# Patient Record
Sex: FEMALE | Race: BLACK OR AFRICAN AMERICAN | Employment: FULL TIME | URBAN - METROPOLITAN AREA
[De-identification: names, ages, dates, MRNs, and addresses within clinical notes are randomized per-mention and may not be internally consistent; named-entity substitution may affect disease eponyms.]

---

## 2017-01-07 ENCOUNTER — OFFICE VISIT (OUTPATIENT)
Dept: FAMILY MEDICINE CLINIC | Facility: CLINIC | Age: 42
End: 2017-01-07

## 2017-01-07 VITALS
TEMPERATURE: 99 F | OXYGEN SATURATION: 98 % | SYSTOLIC BLOOD PRESSURE: 132 MMHG | RESPIRATION RATE: 18 BRPM | DIASTOLIC BLOOD PRESSURE: 82 MMHG | HEART RATE: 96 BPM

## 2017-01-07 DIAGNOSIS — J45.21 MILD INTERMITTENT ASTHMA WITH ACUTE EXACERBATION: Primary | ICD-10-CM

## 2017-01-07 DIAGNOSIS — J01.00 ACUTE NON-RECURRENT MAXILLARY SINUSITIS: ICD-10-CM

## 2017-01-07 PROCEDURE — 99214 OFFICE O/P EST MOD 30 MIN: CPT | Performed by: FAMILY MEDICINE

## 2017-01-07 RX ORDER — PREDNISONE 20 MG/1
60 TABLET ORAL DAILY
Qty: 15 TABLET | Refills: 0 | Status: SHIPPED | OUTPATIENT
Start: 2017-01-07 | End: 2017-05-20 | Stop reason: ALTCHOICE

## 2017-01-07 RX ORDER — DEXTROMETHORPHAN HYDROBROMIDE AND PROMETHAZINE HYDROCHLORIDE 15; 6.25 MG/5ML; MG/5ML
5 SYRUP ORAL 4 TIMES DAILY PRN
Qty: 200 ML | Refills: 0 | Status: SHIPPED | OUTPATIENT
Start: 2017-01-07 | End: 2017-01-14

## 2017-01-07 RX ORDER — IPRATROPIUM BROMIDE AND ALBUTEROL SULFATE 2.5; .5 MG/3ML; MG/3ML
3 SOLUTION RESPIRATORY (INHALATION) 4 TIMES DAILY PRN
Qty: 1 CONTAINER | Refills: 0 | Status: SHIPPED | OUTPATIENT
Start: 2017-01-07 | End: 2017-02-06

## 2017-01-07 RX ORDER — AZITHROMYCIN 250 MG/1
TABLET, FILM COATED ORAL
Qty: 6 TABLET | Refills: 0 | Status: SHIPPED | OUTPATIENT
Start: 2017-01-07 | End: 2017-05-20 | Stop reason: ALTCHOICE

## 2017-01-07 NOTE — PATIENT INSTRUCTIONS
Asthma Medicine     Get used to using your inhaled corticosteroid medicine before you brush your teeth. That way you will always rinse your mouth afterward. Medicines play a key role in controlling asthma.  Some help control asthma symptoms and prevent Inhaled corticosteroids are safe for long-term use. They are not the “steroids” that you hear about athletes abusing. They usually don't cause serious side effects. That’s because they’re inhaled directly into the lungs.  The chance of minor side effects ca · Getting the right dose. Over time, your healthcare provider may raise or lower the dose of your controller medicine. The goal is to find the amount of medicine to keep asthma in control, without taking more than is needed.   · Finding the right medicines When bronchitis develops, the airways become swollen. The airways may also become infected with bacteria. This is known as a secondary infection.   Diagnosing acute bronchitis  Your healthcare provider will examine you and ask about your symptoms and health · Symptoms that don’t start to improve within a week, or within 3 days of taking antibiotics   Date Last Reviewed: 8/4/2014  © 3428-7045 [facility] [address] [place] [address], [address] [place] [address]. All rights reserved.  This information is not inte · Your appetite may be poor, so a light diet is fine. Avoid dehydration by drinking 6 to 8 glasses of fluids per day (such as water, soft drinks, sports drinks, juices, tea, or soup). Extra fluids will help loosen secretions in the nose and lungs.   · Over-

## 2017-01-07 NOTE — PROGRESS NOTES
Patient presents with:  Cough      HPI:   Becky Ramirez is a 35year old female who presents for cough  for  7  days. Patient reports congestion, dry cough, cough is keeping pt up at night. Cough started gradually, tight, wheezy,deep, dry, worse at nigh also had HEP C   • hyperlipidemia [Other] [OTHER] Mother    • Hypertension Mother    • CVA [Other] [OTHER] Maternal Grandmother    • CVA [Other] [OTHER] Paternal Grandfather    • CVA [Other] [OTHER] Paternal Grandmother         Smoking Status: Never Smoke Sig: Take 3 tablets (60 mg total) by mouth daily. azithromycin (ZITHROMAX Z-BERNARD) 250 MG Oral Tab 6 tablet 0      Sig: Take two tablets by mouth today, then one tablet daily.       Promethazine-DM 6.25-15 MG/5ML Oral Syrup 200 mL 0      Sig: Take 5 mL b

## 2017-05-20 ENCOUNTER — TELEPHONE (OUTPATIENT)
Dept: FAMILY MEDICINE CLINIC | Facility: CLINIC | Age: 42
End: 2017-05-20

## 2017-05-20 ENCOUNTER — HOSPITAL ENCOUNTER (OUTPATIENT)
Dept: GENERAL RADIOLOGY | Age: 42
Discharge: HOME | End: 2017-05-20
Attending: FAMILY MEDICINE
Payer: COMMERCIAL

## 2017-05-20 ENCOUNTER — OFFICE VISIT (OUTPATIENT)
Dept: FAMILY MEDICINE CLINIC | Facility: CLINIC | Age: 42
End: 2017-05-20

## 2017-05-20 ENCOUNTER — HOSPITAL ENCOUNTER (OUTPATIENT)
Dept: ULTRASOUND IMAGING | Age: 42
Discharge: HOME | End: 2017-05-20
Attending: FAMILY MEDICINE
Payer: COMMERCIAL

## 2017-05-20 VITALS
OXYGEN SATURATION: 99 % | SYSTOLIC BLOOD PRESSURE: 120 MMHG | DIASTOLIC BLOOD PRESSURE: 80 MMHG | WEIGHT: 293 LBS | TEMPERATURE: 99 F | BODY MASS INDEX: 45.45 KG/M2 | RESPIRATION RATE: 22 BRPM | HEART RATE: 72 BPM | HEIGHT: 67.5 IN

## 2017-05-20 DIAGNOSIS — M79.89 ARM SWELLING: ICD-10-CM

## 2017-05-20 DIAGNOSIS — M77.11 LATERAL EPICONDYLITIS OF RIGHT ELBOW: ICD-10-CM

## 2017-05-20 DIAGNOSIS — M77.11 LATERAL EPICONDYLITIS OF RIGHT ELBOW: Primary | ICD-10-CM

## 2017-05-20 PROCEDURE — 73080 X-RAY EXAM OF ELBOW: CPT | Performed by: FAMILY MEDICINE

## 2017-05-20 PROCEDURE — 93971 EXTREMITY STUDY: CPT | Performed by: FAMILY MEDICINE

## 2017-05-20 PROCEDURE — 99214 OFFICE O/P EST MOD 30 MIN: CPT | Performed by: FAMILY MEDICINE

## 2017-05-20 RX ORDER — METHYLPREDNISOLONE 4 MG/1
TABLET ORAL
Qty: 1 KIT | Refills: 0 | Status: SHIPPED | OUTPATIENT
Start: 2017-05-20 | End: 2017-07-27 | Stop reason: ALTCHOICE

## 2017-05-20 NOTE — PATIENT INSTRUCTIONS
Dr. Omari Tilley DO for nerve study    Neurology  [facility]  [address] [facility]    Phone: [phone] Dr Shae Grimm, [address] [place] [address]    Phone: [phone]

## 2017-05-21 NOTE — PROGRESS NOTES
Patient presents with:  Elbow Pain: Rt. Elbow pain x 5 days       HPI: R elbow  has been hurting for 5 days . Dull, pulling  in character. Radiation to there upper R forearm and R lower forearm, also swelling of the entire arm .  9/10. No weakness.   No num reg., no murmurs, clicks, gallops  SKIN:no rashes on the chest or back. Neuro:  Reflexes radial 2+ bilaterally. R ELBOW: no pain over medial epicondyl,  pain over lateral epicondyl, no diff with supination or pronation. Full extension and flexion.  No eff

## 2017-06-12 ENCOUNTER — TELEPHONE (OUTPATIENT)
Dept: FAMILY MEDICINE CLINIC | Facility: CLINIC | Age: 42
End: 2017-06-12

## 2017-06-12 DIAGNOSIS — R20.2 NUMBNESS AND TINGLING OF RIGHT ARM: Primary | ICD-10-CM

## 2017-06-12 DIAGNOSIS — M79.601 PAIN OF RIGHT UPPER EXTREMITY: ICD-10-CM

## 2017-06-12 DIAGNOSIS — R20.0 NUMBNESS AND TINGLING OF RIGHT ARM: Primary | ICD-10-CM

## 2017-06-12 DIAGNOSIS — M79.89 SWELLING OF ARM: ICD-10-CM

## 2017-06-12 NOTE — TELEPHONE ENCOUNTER
EMG ordered per Dr Mackenzie Andujar notes in [address] copied & pasted below. I called pts cell and left a detailed message there.

## 2017-07-26 ENCOUNTER — OFFICE VISIT (OUTPATIENT)
Dept: NEUROLOGY | Facility: CLINIC | Age: 42
End: 2017-07-26

## 2017-07-26 DIAGNOSIS — R20.2 RIGHT HAND PARESTHESIA: Primary | ICD-10-CM

## 2017-07-26 PROCEDURE — 95909 NRV CNDJ TST 5-6 STUDIES: CPT | Performed by: OTHER

## 2017-07-26 PROCEDURE — 95886 MUSC TEST DONE W/N TEST COMP: CPT | Performed by: OTHER

## 2017-07-26 NOTE — PROCEDURES
Hilaria, [address] [place] [address], [address]  Phone: [phone]  Nerve Conduction & EMG Report      Patient:         Angelica Bang  Patient ID:      [id number]  Sex:             Female  Date of Birth:   [birth year]/ mildly abnormal study. 2. There is evidence of a mild right median compressive mononeuropathy at the wrist, or carpal tunnel syndrome. Clinical correlation is needed, but it is likely not contributing to the majority of her right arm symptoms.   3. There i

## 2017-07-27 ENCOUNTER — OFFICE VISIT (OUTPATIENT)
Dept: FAMILY MEDICINE CLINIC | Facility: CLINIC | Age: 42
End: 2017-07-27

## 2017-07-27 VITALS
BODY MASS INDEX: 45.45 KG/M2 | HEART RATE: 86 BPM | TEMPERATURE: 98 F | WEIGHT: 293 LBS | DIASTOLIC BLOOD PRESSURE: 80 MMHG | SYSTOLIC BLOOD PRESSURE: 124 MMHG | OXYGEN SATURATION: 100 % | RESPIRATION RATE: 22 BRPM | HEIGHT: 67.5 IN

## 2017-07-27 DIAGNOSIS — M77.11 LATERAL EPICONDYLITIS OF RIGHT ELBOW: ICD-10-CM

## 2017-07-27 DIAGNOSIS — G56.01 CARPAL TUNNEL SYNDROME OF RIGHT WRIST: Primary | ICD-10-CM

## 2017-07-27 DIAGNOSIS — Z12.31 VISIT FOR SCREENING MAMMOGRAM: ICD-10-CM

## 2017-07-27 PROCEDURE — 99214 OFFICE O/P EST MOD 30 MIN: CPT | Performed by: FAMILY MEDICINE

## 2017-07-27 RX ORDER — CLINDAMYCIN AND BENZOYL PEROXIDE 10; 50 MG/G; MG/G
1 GEL TOPICAL NIGHTLY
Qty: 50 G | Refills: 2 | Status: SHIPPED | OUTPATIENT
Start: 2017-07-27 | End: 2018-07-22

## 2017-07-27 NOTE — PATIENT INSTRUCTIONS
[address] [facility] Corrie Schuster M.D.     Hand and Upper Extremity Surgery  Physician       [facility], [facility], [address] [place] [address] 222, [address] [address]     [phone]

## 2017-07-27 NOTE — PROGRESS NOTES
R arm pain. HPI: R elbow  has been hurting for 3 months . Dull, pulling  in character. Radiation to there upper R forearm and R lower forearm, also swelling of the entire arm .  5-9/10. No weakness. No numbness but lots of  tingling. Worsening.  Moveme gallops  SKIN:no rashes on the chest or back. Neuro:  Reflexes radial 2+ bilaterally. R WRIST: no snuff box tenderness, no pain over capitate, lunate, or hook of hamate. No pain with ulnar or radial deviation, flexion or extension. FROM. Pain on the maxim

## 2017-07-31 PROBLEM — M77.01 MEDIAL EPICONDYLITIS, RIGHT: Status: ACTIVE | Noted: 2017-07-31

## 2017-08-06 PROBLEM — M25.521 CHRONIC ELBOW PAIN, RIGHT: Status: ACTIVE | Noted: 2017-08-06

## 2017-08-06 PROBLEM — G89.29 CHRONIC ELBOW PAIN, RIGHT: Status: ACTIVE | Noted: 2017-08-06

## 2017-12-04 ENCOUNTER — OFFICE VISIT (OUTPATIENT)
Dept: FAMILY MEDICINE CLINIC | Facility: CLINIC | Age: 42
End: 2017-12-04

## 2017-12-04 VITALS
BODY MASS INDEX: 45.99 KG/M2 | OXYGEN SATURATION: 99 % | TEMPERATURE: 98 F | HEIGHT: 67 IN | HEART RATE: 72 BPM | SYSTOLIC BLOOD PRESSURE: 126 MMHG | RESPIRATION RATE: 20 BRPM | WEIGHT: 293 LBS | DIASTOLIC BLOOD PRESSURE: 84 MMHG

## 2017-12-04 DIAGNOSIS — Z13.228 SCREENING FOR ENDOCRINE, NUTRITIONAL, METABOLIC AND IMMUNITY DISORDER: ICD-10-CM

## 2017-12-04 DIAGNOSIS — Z01.419 WELL WOMAN EXAM WITH ROUTINE GYNECOLOGICAL EXAM: Primary | ICD-10-CM

## 2017-12-04 DIAGNOSIS — Z23 NEED FOR INFLUENZA VACCINATION: ICD-10-CM

## 2017-12-04 DIAGNOSIS — Z12.31 VISIT FOR SCREENING MAMMOGRAM: ICD-10-CM

## 2017-12-04 DIAGNOSIS — Z12.4 SCREENING FOR MALIGNANT NEOPLASM OF CERVIX: ICD-10-CM

## 2017-12-04 DIAGNOSIS — Z13.0 SCREENING FOR ENDOCRINE, NUTRITIONAL, METABOLIC AND IMMUNITY DISORDER: ICD-10-CM

## 2017-12-04 DIAGNOSIS — Z13.29 SCREENING FOR ENDOCRINE, NUTRITIONAL, METABOLIC AND IMMUNITY DISORDER: ICD-10-CM

## 2017-12-04 DIAGNOSIS — Z13.21 SCREENING FOR ENDOCRINE, NUTRITIONAL, METABOLIC AND IMMUNITY DISORDER: ICD-10-CM

## 2017-12-04 PROCEDURE — 87624 HPV HI-RISK TYP POOLED RSLT: CPT | Performed by: FAMILY MEDICINE

## 2017-12-04 PROCEDURE — 88175 CYTOPATH C/V AUTO FLUID REDO: CPT | Performed by: FAMILY MEDICINE

## 2017-12-04 PROCEDURE — 90686 IIV4 VACC NO PRSV 0.5 ML IM: CPT | Performed by: FAMILY MEDICINE

## 2017-12-04 PROCEDURE — 99396 PREV VISIT EST AGE 40-64: CPT | Performed by: FAMILY MEDICINE

## 2017-12-04 PROCEDURE — 90471 IMMUNIZATION ADMIN: CPT | Performed by: FAMILY MEDICINE

## 2017-12-04 RX ORDER — CLINDAMYCIN PHOSPHATE 20 MG/G
1 CREAM VAGINAL NIGHTLY
Qty: 40 G | Refills: 0 | Status: SHIPPED | OUTPATIENT
Start: 2017-12-04 | End: 2019-03-01 | Stop reason: ALTCHOICE

## 2017-12-04 NOTE — PROGRESS NOTES
Kush Yeh is a 38year old female who presents for a complete physical exam.   HPI:     Patient presents with:  Physical      Patient feels well, dental visit up to date, no hearing problem. Vaccinations up to date. [id number]  Period:scant.   Sexu (90 BASE) MCG/ACT Inhalation Aero Soln Inhale 2 puffs into the lungs every 6 (six) hours as needed.  Disp:  Rfl:       Past Medical History:   Diagnosis Date   • Atrial flutter (HCC)    • Extrinsic asthma, unspecified    • Migraine, unspecified, without men or seasonal allergies  PSYCH: no symptoms of depression or anxiety      EXAM:   /84 (BP Location: Left arm, Patient Position: Sitting, Cuff Size: large)   Pulse 72   Temp 98 °F (36.7 °C) (Oral)   Resp 20   Ht 67\"   Wt (!) 327 lb   LMP 11/27/2017   S and ACT score done, reviewed with the pt. Normal scores. Mammogram screening.     Orders Placed This Encounter      CBC W/DIFF      LIPID PANEL      TSH      Vitamin D, 25-Hydroxy [E]      COMP METABOLIC PANEL      Hpv Dna  High Risk , Thin Prep Collect

## 2017-12-09 ENCOUNTER — HOSPITAL ENCOUNTER (OUTPATIENT)
Dept: MAMMOGRAPHY | Age: 42
Discharge: HOME | End: 2017-12-09
Attending: FAMILY MEDICINE
Payer: COMMERCIAL

## 2017-12-09 DIAGNOSIS — Z12.31 VISIT FOR SCREENING MAMMOGRAM: ICD-10-CM

## 2017-12-09 PROCEDURE — 77067 SCR MAMMO BI INCL CAD: CPT | Performed by: FAMILY MEDICINE

## 2018-01-02 ENCOUNTER — TELEPHONE (OUTPATIENT)
Dept: FAMILY MEDICINE CLINIC | Facility: CLINIC | Age: 43
End: 2018-01-02

## 2018-01-02 NOTE — TELEPHONE ENCOUNTER
Patient came into the office wondering if her health & wellness form was complete at this time and she was going to pick it up. I told her I did not see it and I was going to talk to hospitals when she got back into the office on 1/3/18.  Upon further review t

## 2018-01-03 ENCOUNTER — LAB ENCOUNTER (OUTPATIENT)
Dept: LAB | Age: 43
End: 2018-01-03
Attending: FAMILY MEDICINE
Payer: COMMERCIAL

## 2018-01-03 ENCOUNTER — TELEPHONE (OUTPATIENT)
Dept: FAMILY MEDICINE CLINIC | Facility: CLINIC | Age: 43
End: 2018-01-03

## 2018-01-03 DIAGNOSIS — Z13.0 SCREENING FOR ENDOCRINE, NUTRITIONAL, METABOLIC AND IMMUNITY DISORDER: ICD-10-CM

## 2018-01-03 DIAGNOSIS — Z13.228 SCREENING FOR ENDOCRINE, NUTRITIONAL, METABOLIC AND IMMUNITY DISORDER: ICD-10-CM

## 2018-01-03 DIAGNOSIS — Z13.21 SCREENING FOR ENDOCRINE, NUTRITIONAL, METABOLIC AND IMMUNITY DISORDER: ICD-10-CM

## 2018-01-03 DIAGNOSIS — Z13.29 SCREENING FOR ENDOCRINE, NUTRITIONAL, METABOLIC AND IMMUNITY DISORDER: ICD-10-CM

## 2018-01-03 LAB
25-HYDROXYVITAMIN D (TOTAL): 11.6 NG/ML (ref 30–100)
ALBUMIN SERPL-MCNC: 3.6 G/DL (ref 3.5–4.8)
ALP LIVER SERPL-CCNC: 70 U/L (ref 37–98)
ALT SERPL-CCNC: 46 U/L (ref 14–54)
AST SERPL-CCNC: 31 U/L (ref 15–41)
BASOPHILS # BLD AUTO: 0.05 X10(3) UL (ref 0–0.1)
BASOPHILS NFR BLD AUTO: 0.9 %
BILIRUB SERPL-MCNC: 0.9 MG/DL (ref 0.1–2)
BUN BLD-MCNC: 15 MG/DL (ref 8–20)
CALCIUM BLD-MCNC: 9.8 MG/DL (ref 8.3–10.3)
CHLORIDE: 103 MMOL/L (ref 101–111)
CHOLEST SMN-MCNC: 228 MG/DL (ref ?–200)
CO2: 27 MMOL/L (ref 22–32)
CREAT BLD-MCNC: 1.01 MG/DL (ref 0.55–1.02)
EOSINOPHIL # BLD AUTO: 0.13 X10(3) UL (ref 0–0.3)
EOSINOPHIL NFR BLD AUTO: 2.4 %
ERYTHROCYTE [DISTWIDTH] IN BLOOD BY AUTOMATED COUNT: 12.7 % (ref 11.5–16)
GLUCOSE BLD-MCNC: 87 MG/DL (ref 70–99)
HCT VFR BLD AUTO: 43.3 % (ref 34–50)
HDLC SERPL-MCNC: 41 MG/DL (ref 45–?)
HDLC SERPL: 5.56 {RATIO} (ref ?–4.44)
HGB BLD-MCNC: 14 G/DL (ref 12–16)
IMMATURE GRANULOCYTE COUNT: 0.04 X10(3) UL (ref 0–1)
IMMATURE GRANULOCYTE RATIO %: 0.7 %
LDLC SERPL CALC-MCNC: 119 MG/DL (ref ?–130)
LYMPHOCYTES # BLD AUTO: 2.09 X10(3) UL (ref 0.9–4)
LYMPHOCYTES NFR BLD AUTO: 38.3 %
M PROTEIN MFR SERPL ELPH: 7.6 G/DL (ref 6.1–8.3)
MCH RBC QN AUTO: 27.3 PG (ref 27–33.2)
MCHC RBC AUTO-ENTMCNC: 32.3 G/DL (ref 31–37)
MCV RBC AUTO: 84.6 FL (ref 81–100)
MONOCYTES # BLD AUTO: 0.35 X10(3) UL (ref 0.1–0.6)
MONOCYTES NFR BLD AUTO: 6.4 %
NEUTROPHIL ABS PRELIM: 2.79 X10 (3) UL (ref 1.3–6.7)
NEUTROPHILS # BLD AUTO: 2.79 X10(3) UL (ref 1.3–6.7)
NEUTROPHILS NFR BLD AUTO: 51.3 %
NONHDLC SERPL-MCNC: 187 MG/DL (ref ?–130)
PLATELET # BLD AUTO: 176 10(3)UL (ref 150–450)
POTASSIUM SERPL-SCNC: 4.1 MMOL/L (ref 3.6–5.1)
RBC # BLD AUTO: 5.12 X10(6)UL (ref 3.8–5.1)
RED CELL DISTRIBUTION WIDTH-SD: 39 FL (ref 35.1–46.3)
SODIUM SERPL-SCNC: 137 MMOL/L (ref 136–144)
TRIGL SERPL-MCNC: 339 MG/DL (ref ?–150)
TSI SER-ACNC: 4.31 MIU/ML (ref 0.35–5.5)
VLDLC SERPL CALC-MCNC: 68 MG/DL (ref 5–40)
WBC # BLD AUTO: 5.5 X10(3) UL (ref 4–13)

## 2018-01-03 PROCEDURE — 80050 GENERAL HEALTH PANEL: CPT | Performed by: FAMILY MEDICINE

## 2018-01-03 PROCEDURE — 82306 VITAMIN D 25 HYDROXY: CPT | Performed by: FAMILY MEDICINE

## 2018-01-03 PROCEDURE — 36415 COLL VENOUS BLD VENIPUNCTURE: CPT | Performed by: FAMILY MEDICINE

## 2018-01-03 PROCEDURE — 80061 LIPID PANEL: CPT | Performed by: FAMILY MEDICINE

## 2018-01-03 NOTE — TELEPHONE ENCOUNTER
Spoke with patient this am never had wellness labs done had been checing for results and were never completed in order to finish form will await labs.

## 2018-01-04 ENCOUNTER — TELEPHONE (OUTPATIENT)
Dept: FAMILY MEDICINE CLINIC | Facility: CLINIC | Age: 43
End: 2018-01-04

## 2018-01-04 NOTE — TELEPHONE ENCOUNTER
Spoke with patient wellness form complete and will be faxed today ,copy to scan and original left at  for patient .

## 2018-01-08 RX ORDER — ERGOCALCIFEROL 1.25 MG/1
CAPSULE ORAL
Qty: 4 CAPSULE | Refills: 3 | Status: SHIPPED | OUTPATIENT
Start: 2018-01-08 | End: 2018-12-11

## 2018-02-05 RX ORDER — ERGOCALCIFEROL 1.25 MG/1
CAPSULE ORAL
Qty: 4 CAPSULE | Refills: 3
Start: 2018-02-05

## 2018-02-05 NOTE — TELEPHONE ENCOUNTER
From: Eric Mtz  Sent: 2/3/2018 9:03 PM CST  Subject: Medication Renewal Request    Baylee Siddiqi would like a refill of the following medications:     ERGOCALCIFEROL 74436 units Oral Cap Evelyn Rodriguez MD]    Preferred pharmacy: [facility]

## 2018-12-10 ENCOUNTER — OFFICE VISIT (OUTPATIENT)
Dept: FAMILY MEDICINE CLINIC | Facility: CLINIC | Age: 43
End: 2018-12-10
Payer: COMMERCIAL

## 2018-12-10 VITALS
DIASTOLIC BLOOD PRESSURE: 86 MMHG | BODY MASS INDEX: 45.99 KG/M2 | OXYGEN SATURATION: 98 % | TEMPERATURE: 99 F | SYSTOLIC BLOOD PRESSURE: 128 MMHG | RESPIRATION RATE: 22 BRPM | HEIGHT: 67 IN | HEART RATE: 86 BPM | WEIGHT: 293 LBS

## 2018-12-10 DIAGNOSIS — Z13.228 SCREENING FOR ENDOCRINE, NUTRITIONAL, METABOLIC AND IMMUNITY DISORDER: ICD-10-CM

## 2018-12-10 DIAGNOSIS — Z13.0 SCREENING FOR ENDOCRINE, NUTRITIONAL, METABOLIC AND IMMUNITY DISORDER: ICD-10-CM

## 2018-12-10 DIAGNOSIS — R53.82 CHRONIC FATIGUE: ICD-10-CM

## 2018-12-10 DIAGNOSIS — Z13.21 SCREENING FOR ENDOCRINE, NUTRITIONAL, METABOLIC AND IMMUNITY DISORDER: ICD-10-CM

## 2018-12-10 DIAGNOSIS — E66.01 MORBID OBESITY (HCC): ICD-10-CM

## 2018-12-10 DIAGNOSIS — N91.2 AMENORRHEA: ICD-10-CM

## 2018-12-10 DIAGNOSIS — Z00.00 ROUTINE GENERAL MEDICAL EXAMINATION AT A HEALTH CARE FACILITY: Primary | ICD-10-CM

## 2018-12-10 DIAGNOSIS — F51.01 PRIMARY INSOMNIA: ICD-10-CM

## 2018-12-10 DIAGNOSIS — Z12.31 VISIT FOR SCREENING MAMMOGRAM: ICD-10-CM

## 2018-12-10 DIAGNOSIS — Z23 NEED FOR INFLUENZA VACCINATION: ICD-10-CM

## 2018-12-10 DIAGNOSIS — Z13.29 SCREENING FOR ENDOCRINE, NUTRITIONAL, METABOLIC AND IMMUNITY DISORDER: ICD-10-CM

## 2018-12-10 PROCEDURE — 99396 PREV VISIT EST AGE 40-64: CPT | Performed by: FAMILY MEDICINE

## 2018-12-10 PROCEDURE — 90686 IIV4 VACC NO PRSV 0.5 ML IM: CPT | Performed by: FAMILY MEDICINE

## 2018-12-10 PROCEDURE — 90471 IMMUNIZATION ADMIN: CPT | Performed by: FAMILY MEDICINE

## 2018-12-10 PROCEDURE — 99213 OFFICE O/P EST LOW 20 MIN: CPT | Performed by: FAMILY MEDICINE

## 2018-12-10 RX ORDER — TRAZODONE HYDROCHLORIDE 50 MG/1
50 TABLET ORAL NIGHTLY
Qty: 30 TABLET | Refills: 3 | Status: SHIPPED | OUTPATIENT
Start: 2018-12-10 | End: 2020-11-09 | Stop reason: CLARIF

## 2018-12-10 NOTE — PATIENT INSTRUCTIONS
[facility]:    Please call:    [address] [facility] [address] [facility], [address]  [phone] [address] Tatyana Montez, [address] [facility]  [email or web address]  [phone].         Bakari Welch M.D   [address] [facility]

## 2018-12-11 ENCOUNTER — LAB ENCOUNTER (OUTPATIENT)
Dept: LAB | Age: 43
End: 2018-12-11
Attending: FAMILY MEDICINE
Payer: COMMERCIAL

## 2018-12-11 ENCOUNTER — TELEPHONE (OUTPATIENT)
Dept: FAMILY MEDICINE CLINIC | Facility: CLINIC | Age: 43
End: 2018-12-11

## 2018-12-11 DIAGNOSIS — Z13.29 SCREENING FOR ENDOCRINE, NUTRITIONAL, METABOLIC AND IMMUNITY DISORDER: ICD-10-CM

## 2018-12-11 DIAGNOSIS — E55.9 VITAMIN D DEFICIENCY: ICD-10-CM

## 2018-12-11 DIAGNOSIS — R31.9 HEMATURIA, UNSPECIFIED TYPE: ICD-10-CM

## 2018-12-11 DIAGNOSIS — R53.82 CHRONIC FATIGUE: ICD-10-CM

## 2018-12-11 DIAGNOSIS — Z13.228 SCREENING FOR ENDOCRINE, NUTRITIONAL, METABOLIC AND IMMUNITY DISORDER: ICD-10-CM

## 2018-12-11 DIAGNOSIS — R73.09 ELEVATED GLUCOSE: ICD-10-CM

## 2018-12-11 DIAGNOSIS — Z13.0 SCREENING FOR ENDOCRINE, NUTRITIONAL, METABOLIC AND IMMUNITY DISORDER: ICD-10-CM

## 2018-12-11 DIAGNOSIS — Z00.00 ROUTINE GENERAL MEDICAL EXAMINATION AT A HEALTH CARE FACILITY: ICD-10-CM

## 2018-12-11 DIAGNOSIS — N91.2 AMENORRHEA: ICD-10-CM

## 2018-12-11 DIAGNOSIS — Z13.21 SCREENING FOR ENDOCRINE, NUTRITIONAL, METABOLIC AND IMMUNITY DISORDER: ICD-10-CM

## 2018-12-11 DIAGNOSIS — R82.90 ABNORMAL URINALYSIS: Primary | ICD-10-CM

## 2018-12-11 DIAGNOSIS — E78.1 HYPERTRIGLYCERIDEMIA: ICD-10-CM

## 2018-12-11 PROCEDURE — 83036 HEMOGLOBIN GLYCOSYLATED A1C: CPT | Performed by: FAMILY MEDICINE

## 2018-12-11 PROCEDURE — 82607 VITAMIN B-12: CPT | Performed by: FAMILY MEDICINE

## 2018-12-11 PROCEDURE — 82306 VITAMIN D 25 HYDROXY: CPT | Performed by: FAMILY MEDICINE

## 2018-12-11 PROCEDURE — 80050 GENERAL HEALTH PANEL: CPT | Performed by: FAMILY MEDICINE

## 2018-12-11 PROCEDURE — 81001 URINALYSIS AUTO W/SCOPE: CPT | Performed by: FAMILY MEDICINE

## 2018-12-11 PROCEDURE — 80061 LIPID PANEL: CPT | Performed by: FAMILY MEDICINE

## 2018-12-11 PROCEDURE — 36415 COLL VENOUS BLD VENIPUNCTURE: CPT | Performed by: FAMILY MEDICINE

## 2018-12-11 PROCEDURE — 83001 ASSAY OF GONADOTROPIN (FSH): CPT | Performed by: FAMILY MEDICINE

## 2018-12-11 PROCEDURE — 83002 ASSAY OF GONADOTROPIN (LH): CPT | Performed by: FAMILY MEDICINE

## 2018-12-11 RX ORDER — ERGOCALCIFEROL 1.25 MG/1
CAPSULE ORAL
Qty: 4 CAPSULE | Refills: 3 | Status: SHIPPED | OUTPATIENT
Start: 2018-12-11 | End: 2019-04-30

## 2018-12-11 NOTE — PROGRESS NOTES
Zuri Villalobos is a 37year old female who presents for a complete physical exam, no gyn. HPI:     Patient presents with:  Physical: Wellness form , Asthma check      Patient feels well, dental visit up to date, no hearing problem.   Vaccinations up t • Unspecified sleep apnea    • Vitamin D deficiency       Past Surgical History:   Procedure Laterality Date   •      • CHOLECYSTECTOMY     • D & C     • ENDOMETRIAL ABLATION     • LEG/ANKLE SURGERY PROC UNLISTED  98    ankle Heart: is RRR. S1, S2, with no murmurs,clicks, gallops  Abdomen: is soft,NBS, NT/ND with no HSM. No rebound or guarding. No CVA tenderness, no hernias.   Neuro: Cranial nerves II-XII normal,no focal abnormalities, and reflexes coordination and gait norm maintenance. The patient indicates understanding of these issues and agrees to the plan. The patient is asked to return in 3 months.

## 2018-12-11 NOTE — TELEPHONE ENCOUNTER
----- Message from Natalia Welch MD sent at 12/11/2018  4:56 PM CST -----  Urine culture. Gyn US. Low vit. D, Rx [id number] for 4 months, weekly please. Low glucose diet, add hgbA1C.    Vit. B12 OTC, sublingual 1000Ug a day.

## 2018-12-11 NOTE — TELEPHONE ENCOUNTER
I called pt at [phone] and verified 2 patient identifiers: name & . I discussed results and recommendations at length with patient. Urine culture and vit D RX sent.   I called the lab and they are running the A1c.     1) Pt is questioning what the

## 2018-12-11 NOTE — TELEPHONE ENCOUNTER
Hormons look good, I worry about uterus endometrium with blood in the urine I want to make sure everything is fine.   I do always vaginal gyn US, through abdomen is for women who never got Paps and young girls etc.

## 2018-12-12 NOTE — TELEPHONE ENCOUNTER
Pt is asking since she has not had a period in 1 year, do [facility] & [address] labs show she is in Menopause, if not should she be concerned? US order placed in Mike.

## 2018-12-13 ENCOUNTER — TELEPHONE (OUTPATIENT)
Dept: FAMILY MEDICINE CLINIC | Facility: CLINIC | Age: 43
End: 2018-12-13

## 2018-12-13 NOTE — TELEPHONE ENCOUNTER
CVM was left on CVM per Hippa Re: Wellness form completed and left at  for  , a copy placed for chart.

## 2018-12-24 ENCOUNTER — APPOINTMENT (OUTPATIENT)
Dept: LAB | Age: 43
End: 2018-12-24
Attending: FAMILY MEDICINE
Payer: COMMERCIAL

## 2018-12-24 ENCOUNTER — HOSPITAL ENCOUNTER (OUTPATIENT)
Dept: MAMMOGRAPHY | Age: 43
Discharge: HOME | End: 2018-12-24
Attending: FAMILY MEDICINE
Payer: COMMERCIAL

## 2018-12-24 DIAGNOSIS — Z12.31 VISIT FOR SCREENING MAMMOGRAM: ICD-10-CM

## 2018-12-24 DIAGNOSIS — N91.2 AMENORRHEA: ICD-10-CM

## 2018-12-24 DIAGNOSIS — R82.90 ABNORMAL URINALYSIS: ICD-10-CM

## 2018-12-24 PROCEDURE — 36415 COLL VENOUS BLD VENIPUNCTURE: CPT | Performed by: FAMILY MEDICINE

## 2018-12-24 PROCEDURE — 77067 SCR MAMMO BI INCL CAD: CPT | Performed by: FAMILY MEDICINE

## 2018-12-24 PROCEDURE — 87086 URINE CULTURE/COLONY COUNT: CPT | Performed by: FAMILY MEDICINE

## 2018-12-24 PROCEDURE — 84146 ASSAY OF PROLACTIN: CPT | Performed by: FAMILY MEDICINE

## 2018-12-26 ENCOUNTER — OFFICE VISIT (OUTPATIENT)
Dept: SLEEP CENTER | Age: 43
End: 2018-12-26
Attending: FAMILY MEDICINE
Payer: COMMERCIAL

## 2018-12-26 DIAGNOSIS — R53.82 CHRONIC FATIGUE: ICD-10-CM

## 2018-12-26 PROCEDURE — 95810 POLYSOM 6/> YRS 4/> PARAM: CPT

## 2018-12-28 ENCOUNTER — TELEPHONE (OUTPATIENT)
Dept: FAMILY MEDICINE CLINIC | Facility: CLINIC | Age: 43
End: 2018-12-28

## 2018-12-28 ENCOUNTER — HOSPITAL ENCOUNTER (OUTPATIENT)
Dept: ULTRASOUND IMAGING | Age: 43
Discharge: HOME | End: 2018-12-28
Attending: FAMILY MEDICINE
Payer: COMMERCIAL

## 2018-12-28 DIAGNOSIS — G47.33 OSA (OBSTRUCTIVE SLEEP APNEA): Primary | ICD-10-CM

## 2018-12-28 DIAGNOSIS — R31.9 HEMATURIA, UNSPECIFIED TYPE: ICD-10-CM

## 2018-12-28 DIAGNOSIS — N91.2 AMENORRHEA: ICD-10-CM

## 2018-12-28 PROCEDURE — 76830 TRANSVAGINAL US NON-OB: CPT | Performed by: FAMILY MEDICINE

## 2018-12-28 PROCEDURE — 76856 US EXAM PELVIC COMPLETE: CPT | Performed by: FAMILY MEDICINE

## 2018-12-28 NOTE — TELEPHONE ENCOUNTER
Pt notified of [address] below orders. Pt has seen Dr. Page Baez in the past & will see him again. Results faxed to his office. Confirmation received. All questions answered, pt expresses understanding.

## 2018-12-28 NOTE — TELEPHONE ENCOUNTER
----- Message from Simon Wen MD sent at 12/28/2018 10:23 AM CST -----  I want pt to see gyn. Looks good but we do not see ovaries well.   Fidel Pardo MD  Obstetrics/Gynecology     [address] [place] [address]  Phone: [phone]

## 2018-12-28 NOTE — TELEPHONE ENCOUNTER
----- Message from Mariann Pollock MD sent at 12/28/2018 11:39 AM CST -----  Pt needs to f/u for consultation with Dr. Doreen Jakcson or Dr. Jorgito Sanchez.

## 2018-12-28 NOTE — PROCEDURES
[address] [address]       Accredited by the [facility] of Sleep Medicine (AASM)    PATIENT'S NAME:        Maddy Johns  ATTENDING PHYSICIAN:   Lawrence Rebollar M.D.   REFERRING PHYSICIAN:   Maite Stevenson M.D. MOVEMENTS:  Periodic limb movements were not observed. EEG:  With the limited montage recorded, no EEG abnormalities were observed.     IMPRESSION:  This study, along with the clinical history, is consistent with obstructive sleep apnea/hypopnea syndrom

## 2018-12-28 NOTE — TELEPHONE ENCOUNTER
Pt notified of Sleep study results & below orders. Dr. Ravindra Barbosa office info given. All questions answered, pt expresses understanding.

## 2019-01-11 ENCOUNTER — OFFICE VISIT (OUTPATIENT)
Dept: SLEEP CENTER | Age: 44
End: 2019-01-11
Attending: INTERNAL MEDICINE
Payer: COMMERCIAL

## 2019-01-11 DIAGNOSIS — G47.33 OSA (OBSTRUCTIVE SLEEP APNEA): ICD-10-CM

## 2019-01-11 DIAGNOSIS — G47.10 HYPERSOMNIA: ICD-10-CM

## 2019-01-11 DIAGNOSIS — R06.83 SNORING: ICD-10-CM

## 2019-01-11 DIAGNOSIS — E66.01 MORBID OBESITY WITH BMI OF 50.0-59.9, ADULT (HCC): ICD-10-CM

## 2019-01-11 PROCEDURE — 95811 POLYSOM 6/>YRS CPAP 4/> PARM: CPT

## 2019-01-16 NOTE — PROCEDURES
[address] [address]       Accredited by the [facility] of Sleep Medicine (AASM)    PATIENT'S NAME:        Danyell Pate  ATTENDING PHYSICIAN:   Sanjay Moore M.D. REFERRING PHYSICIAN:   Sanjay Moore M.D.   CHADWICK water and was titrated up to a final pressure of 9 cm of water.   On this setting, the patient was able to achieve periods of stage REM sleep in the supine position, however, did have a few obstructive hypopneas noted with a residual apnea-hypopnea index of

## 2019-03-01 PROBLEM — G47.33 OSA ON CPAP: Status: ACTIVE | Noted: 2019-03-01

## 2019-03-01 PROBLEM — Z99.89 OSA ON CPAP: Status: ACTIVE | Noted: 2019-03-01

## 2019-04-30 RX ORDER — ERGOCALCIFEROL 1.25 MG/1
CAPSULE ORAL
Qty: 4 CAPSULE | Refills: 0 | Status: SHIPPED | OUTPATIENT
Start: 2019-04-30 | End: 2019-05-28

## 2019-04-30 NOTE — TELEPHONE ENCOUNTER
Medication(s) to Refill:   Requested Prescriptions     Pending Prescriptions Disp Refills   • ERGOCALCIFEROL 59338 units Oral Cap [Pharmacy Med Name: Vitamin D 50,000 Unit Cap Stri] 4 capsule 2     Sig: TAKE ONE CAPSULE BY MOUTH ONCE A WEEK         Reason

## 2019-05-28 RX ORDER — ERGOCALCIFEROL 1.25 MG/1
CAPSULE ORAL
Qty: 4 CAPSULE | Refills: 0 | Status: SHIPPED | OUTPATIENT
Start: 2019-05-28 | End: 2020-11-09 | Stop reason: CLARIF

## 2019-05-28 NOTE — TELEPHONE ENCOUNTER
Medication(s) to Refill:   Requested Prescriptions     Pending Prescriptions Disp Refills   • ERGOCALCIFEROL 98028 units Oral Cap [Pharmacy Med Name: Vitamin D 50,000 Unit Cap Stri] 4 capsule 0     Sig: TAKE ONE CAPSULE BY MOUTH ONCE A WEEK         Reason

## 2019-06-06 PROBLEM — L03.032 PARONYCHIA OF GREAT TOE, LEFT: Status: ACTIVE | Noted: 2019-06-06

## 2019-11-22 ENCOUNTER — HOSPITAL ENCOUNTER (OUTPATIENT)
Dept: GENERAL RADIOLOGY | Age: 44
Discharge: HOME | End: 2019-11-22
Attending: NURSE PRACTITIONER
Payer: COMMERCIAL

## 2019-11-22 ENCOUNTER — OFFICE VISIT (OUTPATIENT)
Dept: FAMILY MEDICINE CLINIC | Facility: CLINIC | Age: 44
End: 2019-11-22
Payer: COMMERCIAL

## 2019-11-22 VITALS
RESPIRATION RATE: 16 BRPM | TEMPERATURE: 98 F | HEIGHT: 67 IN | OXYGEN SATURATION: 98 % | DIASTOLIC BLOOD PRESSURE: 78 MMHG | WEIGHT: 293 LBS | SYSTOLIC BLOOD PRESSURE: 130 MMHG | HEART RATE: 87 BPM | BODY MASS INDEX: 45.99 KG/M2

## 2019-11-22 DIAGNOSIS — J45.21 MILD INTERMITTENT ASTHMA WITH ACUTE EXACERBATION: ICD-10-CM

## 2019-11-22 DIAGNOSIS — J06.9 URI, ACUTE: Primary | ICD-10-CM

## 2019-11-22 DIAGNOSIS — J06.9 URI, ACUTE: ICD-10-CM

## 2019-11-22 PROCEDURE — 71046 X-RAY EXAM CHEST 2 VIEWS: CPT | Performed by: NURSE PRACTITIONER

## 2019-11-22 PROCEDURE — 99214 OFFICE O/P EST MOD 30 MIN: CPT | Performed by: NURSE PRACTITIONER

## 2019-11-22 PROCEDURE — 94640 AIRWAY INHALATION TREATMENT: CPT | Performed by: NURSE PRACTITIONER

## 2019-11-22 RX ORDER — BENZONATATE 100 MG/1
CAPSULE ORAL 3 TIMES DAILY PRN
Qty: 30 CAPSULE | Refills: 0 | Status: SHIPPED | OUTPATIENT
Start: 2019-11-22 | End: 2020-11-09 | Stop reason: CLARIF

## 2019-11-22 RX ORDER — PREDNISONE 20 MG/1
40 TABLET ORAL DAILY
Qty: 10 TABLET | Refills: 0 | Status: SHIPPED | OUTPATIENT
Start: 2019-11-22 | End: 2019-11-26 | Stop reason: ALTCHOICE

## 2019-11-22 RX ORDER — IPRATROPIUM BROMIDE AND ALBUTEROL SULFATE 2.5; .5 MG/3ML; MG/3ML
3 SOLUTION RESPIRATORY (INHALATION) ONCE
Status: COMPLETED | OUTPATIENT
Start: 2019-11-22 | End: 2019-11-22

## 2019-11-22 RX ORDER — AZITHROMYCIN 250 MG/1
TABLET, FILM COATED ORAL
Qty: 6 TABLET | Refills: 0 | Status: SHIPPED | OUTPATIENT
Start: 2019-11-22 | End: 2019-11-26 | Stop reason: ALTCHOICE

## 2019-11-22 RX ADMIN — IPRATROPIUM BROMIDE AND ALBUTEROL SULFATE 3 ML: 2.5; .5 SOLUTION RESPIRATORY (INHALATION) at 09:13:00

## 2019-11-22 NOTE — PROGRESS NOTES
Chief Complaint:   Patient presents with:  Cough: x 3 days     HPI:   This is a 37year old female presenting for evaluation of cough x 3 days. Cough is productive of yellow to gray sputum.  Associated symptoms include low grade fever, T max 101 F on Wednes HIVES    Comment:Derivatives  Current Meds:  Current Outpatient Medications   Medication Sig Dispense Refill   • ERGOCALCIFEROL 18779 units Oral Cap TAKE ONE CAPSULE BY MOUTH ONCE A WEEK 4 capsule 0   • TraZODone HCl 50 MG Oral Tab Take 1 tablet (50 m Tympanic membrane and ear canal normal. Tympanic membrane is not erythematous. Left Ear: Tympanic membrane and ear canal normal. Tympanic membrane is not erythematous.    Nose: Nose normal.   Mouth/Throat: Oropharynx is clear and moist. No oropharyngeal e

## 2019-11-26 ENCOUNTER — OFFICE VISIT (OUTPATIENT)
Dept: FAMILY MEDICINE CLINIC | Facility: CLINIC | Age: 44
End: 2019-11-26
Payer: COMMERCIAL

## 2019-11-26 ENCOUNTER — TELEPHONE (OUTPATIENT)
Dept: FAMILY MEDICINE CLINIC | Facility: CLINIC | Age: 44
End: 2019-11-26

## 2019-11-26 VITALS
WEIGHT: 293 LBS | HEART RATE: 84 BPM | RESPIRATION RATE: 20 BRPM | BODY MASS INDEX: 45.99 KG/M2 | HEIGHT: 67 IN | OXYGEN SATURATION: 99 % | DIASTOLIC BLOOD PRESSURE: 78 MMHG | TEMPERATURE: 99 F | SYSTOLIC BLOOD PRESSURE: 128 MMHG

## 2019-11-26 DIAGNOSIS — Z12.31 VISIT FOR SCREENING MAMMOGRAM: ICD-10-CM

## 2019-11-26 DIAGNOSIS — Z13.0 SCREENING FOR ENDOCRINE, NUTRITIONAL, METABOLIC AND IMMUNITY DISORDER: ICD-10-CM

## 2019-11-26 DIAGNOSIS — Z13.29 SCREENING FOR ENDOCRINE, NUTRITIONAL, METABOLIC AND IMMUNITY DISORDER: ICD-10-CM

## 2019-11-26 DIAGNOSIS — J45.21 MILD INTERMITTENT ASTHMA WITH ACUTE EXACERBATION: Primary | ICD-10-CM

## 2019-11-26 DIAGNOSIS — Z13.228 SCREENING FOR ENDOCRINE, NUTRITIONAL, METABOLIC AND IMMUNITY DISORDER: ICD-10-CM

## 2019-11-26 DIAGNOSIS — Z13.21 SCREENING FOR ENDOCRINE, NUTRITIONAL, METABOLIC AND IMMUNITY DISORDER: ICD-10-CM

## 2019-11-26 PROCEDURE — 99214 OFFICE O/P EST MOD 30 MIN: CPT | Performed by: FAMILY MEDICINE

## 2019-11-26 RX ORDER — NEBULIZER ACCESSORIES
KIT MISCELLANEOUS
Qty: 1 KIT | Refills: 0 | Status: SHIPPED | OUTPATIENT
Start: 2019-11-26 | End: 2020-11-09 | Stop reason: CLARIF

## 2019-11-26 RX ORDER — METHYLPREDNISOLONE 4 MG/1
TABLET ORAL
Qty: 1 KIT | Refills: 0 | Status: SHIPPED | OUTPATIENT
Start: 2019-11-26 | End: 2020-11-03

## 2019-11-26 RX ORDER — IPRATROPIUM BROMIDE AND ALBUTEROL SULFATE 2.5; .5 MG/3ML; MG/3ML
3 SOLUTION RESPIRATORY (INHALATION) EVERY 6 HOURS PRN
Qty: 1 CONTAINER | Refills: 3 | Status: SHIPPED | OUTPATIENT
Start: 2019-11-26 | End: 2020-11-09 | Stop reason: CLARIF

## 2019-11-26 RX ORDER — DEXTROMETHORPHAN HYDROBROMIDE AND PROMETHAZINE HYDROCHLORIDE 15; 6.25 MG/5ML; MG/5ML
5 SYRUP ORAL 4 TIMES DAILY PRN
Qty: 120 ML | Refills: 0 | Status: SHIPPED | OUTPATIENT
Start: 2019-11-26 | End: 2019-12-03

## 2019-11-26 NOTE — PROGRESS NOTES
Patient presents with:  Asthma: wellness form      HPI:   Carline Ann is a 53year old female who presents for cough and wheezing for  10  days. Patient reports congestion, dry cough, cough is keeping pt up at night, wheezing. Cough started gradu Freddy 69%    • Unspecified asthma(493.90)    • Unspecified gastritis and gastroduodenitis without mention of hemorrhage    • Unspecified sleep apnea    • Vitamin D deficiency       Past Surgical History:   Procedure Laterality Date   •   / tenderness    ASSESSMENT AND PLAN:   Darryl Sexton is a 53year old female who presents with: acute asthma exacerbation. Increase fluids, rest.Meds as below.   Requested Prescriptions     Signed Prescriptions Disp Refills   • ipratropium-albuterol

## 2019-11-26 NOTE — TELEPHONE ENCOUNTER
Patient was here to see Dr. Liv Reeder today 11/26/19 and forgot to mention she needs new tubing and a mask for nebulizer. Please Advise. Thank you.

## 2019-11-26 NOTE — TELEPHONE ENCOUNTER
Patient was seen today and forgot that she needs new nebulizer tubing and mask. Please approve or deny pending Rx. Thank you!

## 2019-12-06 ENCOUNTER — PATIENT MESSAGE (OUTPATIENT)
Dept: FAMILY MEDICINE CLINIC | Facility: CLINIC | Age: 44
End: 2019-12-06

## 2019-12-09 NOTE — TELEPHONE ENCOUNTER
From: Sneha Granados  To: Fred Irvin MD  Sent: 12/6/2019 7:47 PM CST  Subject: Non-Urgent [facility] Dr. Taco [place],   I recently had a cortisone injection in my left elbow and have been on two rounds of prednisone.  I haven’t ha

## 2019-12-12 ENCOUNTER — LAB ENCOUNTER (OUTPATIENT)
Dept: LAB | Age: 44
End: 2019-12-12
Attending: FAMILY MEDICINE
Payer: COMMERCIAL

## 2019-12-12 DIAGNOSIS — Z13.21 SCREENING FOR ENDOCRINE, NUTRITIONAL, METABOLIC AND IMMUNITY DISORDER: ICD-10-CM

## 2019-12-12 DIAGNOSIS — Z13.228 SCREENING FOR ENDOCRINE, NUTRITIONAL, METABOLIC AND IMMUNITY DISORDER: ICD-10-CM

## 2019-12-12 DIAGNOSIS — Z13.29 SCREENING FOR ENDOCRINE, NUTRITIONAL, METABOLIC AND IMMUNITY DISORDER: ICD-10-CM

## 2019-12-12 DIAGNOSIS — Z13.0 SCREENING FOR ENDOCRINE, NUTRITIONAL, METABOLIC AND IMMUNITY DISORDER: ICD-10-CM

## 2019-12-12 PROCEDURE — 36415 COLL VENOUS BLD VENIPUNCTURE: CPT | Performed by: FAMILY MEDICINE

## 2019-12-12 PROCEDURE — 80050 GENERAL HEALTH PANEL: CPT | Performed by: FAMILY MEDICINE

## 2019-12-12 PROCEDURE — 80061 LIPID PANEL: CPT | Performed by: FAMILY MEDICINE

## 2019-12-30 ENCOUNTER — HOSPITAL ENCOUNTER (OUTPATIENT)
Dept: MAMMOGRAPHY | Age: 44
Discharge: HOME | End: 2019-12-30
Attending: FAMILY MEDICINE
Payer: COMMERCIAL

## 2019-12-30 DIAGNOSIS — Z12.31 VISIT FOR SCREENING MAMMOGRAM: ICD-10-CM

## 2019-12-30 PROCEDURE — 77067 SCR MAMMO BI INCL CAD: CPT | Performed by: FAMILY MEDICINE

## 2020-05-28 ENCOUNTER — HOSPITAL ENCOUNTER (OUTPATIENT)
Dept: GENERAL RADIOLOGY | Age: 45
Discharge: HOME | End: 2020-05-28
Attending: FAMILY MEDICINE
Payer: COMMERCIAL

## 2020-05-28 ENCOUNTER — OFFICE VISIT (OUTPATIENT)
Dept: FAMILY MEDICINE CLINIC | Facility: CLINIC | Age: 45
End: 2020-05-28
Payer: COMMERCIAL

## 2020-05-28 ENCOUNTER — TELEPHONE (OUTPATIENT)
Dept: FAMILY MEDICINE CLINIC | Facility: CLINIC | Age: 45
End: 2020-05-28

## 2020-05-28 ENCOUNTER — APPOINTMENT (OUTPATIENT)
Dept: LAB | Age: 45
End: 2020-05-28
Attending: FAMILY MEDICINE
Payer: COMMERCIAL

## 2020-05-28 VITALS
WEIGHT: 293 LBS | HEIGHT: 67 IN | HEART RATE: 80 BPM | TEMPERATURE: 99 F | SYSTOLIC BLOOD PRESSURE: 124 MMHG | BODY MASS INDEX: 45.99 KG/M2 | DIASTOLIC BLOOD PRESSURE: 84 MMHG | OXYGEN SATURATION: 100 % | RESPIRATION RATE: 20 BRPM

## 2020-05-28 DIAGNOSIS — M79.671 RIGHT FOOT PAIN: ICD-10-CM

## 2020-05-28 DIAGNOSIS — M79.671 RIGHT FOOT PAIN: Primary | ICD-10-CM

## 2020-05-28 DIAGNOSIS — E55.9 VITAMIN D DEFICIENCY: ICD-10-CM

## 2020-05-28 PROCEDURE — 36415 COLL VENOUS BLD VENIPUNCTURE: CPT | Performed by: FAMILY MEDICINE

## 2020-05-28 PROCEDURE — 73630 X-RAY EXAM OF FOOT: CPT | Performed by: FAMILY MEDICINE

## 2020-05-28 PROCEDURE — 82306 VITAMIN D 25 HYDROXY: CPT | Performed by: FAMILY MEDICINE

## 2020-05-28 PROCEDURE — 84550 ASSAY OF BLOOD/URIC ACID: CPT | Performed by: FAMILY MEDICINE

## 2020-05-28 PROCEDURE — 99214 OFFICE O/P EST MOD 30 MIN: CPT | Performed by: FAMILY MEDICINE

## 2020-05-28 RX ORDER — BUTALBITAL, ACETAMINOPHEN AND CAFFEINE 300; 40; 50 MG/1; MG/1; MG/1
1 CAPSULE ORAL EVERY 4 HOURS PRN
Qty: 84 CAPSULE | Refills: 0 | Status: SHIPPED | OUTPATIENT
Start: 2020-05-28 | End: 2020-06-11

## 2020-05-28 RX ORDER — CELECOXIB 200 MG/1
200 CAPSULE ORAL DAILY
Qty: 30 CAPSULE | Refills: 1 | Status: SHIPPED | OUTPATIENT
Start: 2020-05-28 | End: 2020-11-09 | Stop reason: CLARIF

## 2020-05-28 NOTE — PROGRESS NOTES
Patient presents with: Foot Pain: Rt. Foot       HPI: R big toe (base of the joint)  has been hurting for 4 days . It is swollen, painful to touch. Pain is throbbing  in character. Radiation -none . 5/10. No weakness. No numbness or tingling. Worsening. Migraine, unspecified, without mention of intractable migraine without mention of status migrainosus    • Mitral valve disorders(424.0)    • Obesity    • SELINA (obstructive sleep apnea) 12/26/18 PSG    AHI 18 REM AHI 45 Supine AHI 49 Sao2 Freddy 69%    • Unsp -40 MG Oral Cap 84 capsule 0     Sig: Take 1 capsule by mouth every 4 (four) hours as needed for Pain. • celecoxib (CELEBREX) 200 MG Oral Cap 30 capsule 1     Sig: Take 1 capsule (200 mg total) by mouth daily. Cancel Fioricet Rx please   rest, ice.

## 2020-06-01 ENCOUNTER — TELEPHONE (OUTPATIENT)
Dept: FAMILY MEDICINE CLINIC | Facility: CLINIC | Age: 45
End: 2020-06-01

## 2020-06-01 RX ORDER — ERGOCALCIFEROL 1.25 MG/1
50000 CAPSULE ORAL WEEKLY
Qty: 4 CAPSULE | Refills: 3 | Status: SHIPPED | OUTPATIENT
Start: 2020-06-01 | End: 2020-07-01

## 2020-06-01 NOTE — TELEPHONE ENCOUNTER
----- Message from Olga Mcclain MD sent at 5/29/2020  9:18 AM CDT -----  Gout. If not better on Celebrex ok to do Medrol Pack. Low vit.  D, Rx [id number] for 4 months, weekly please.    msg sent to [facility] and med sent to pharmacy for Vit D

## 2020-09-17 RX ORDER — ERGOCALCIFEROL 1.25 MG/1
CAPSULE ORAL
Qty: 4 CAPSULE | Refills: 0 | OUTPATIENT
Start: 2020-09-17

## 2020-11-03 ENCOUNTER — OFFICE VISIT (OUTPATIENT)
Dept: FAMILY MEDICINE CLINIC | Facility: CLINIC | Age: 45
End: 2020-11-03
Payer: COMMERCIAL

## 2020-11-03 ENCOUNTER — HOSPITAL ENCOUNTER (EMERGENCY)
Age: 45
Discharge: HOME | End: 2020-11-03
Attending: EMERGENCY MEDICINE
Payer: COMMERCIAL

## 2020-11-03 ENCOUNTER — APPOINTMENT (OUTPATIENT)
Dept: GENERAL RADIOLOGY | Age: 45
End: 2020-11-03
Attending: EMERGENCY MEDICINE
Payer: COMMERCIAL

## 2020-11-03 VITALS
RESPIRATION RATE: 16 BRPM | HEART RATE: 92 BPM | OXYGEN SATURATION: 98 % | SYSTOLIC BLOOD PRESSURE: 143 MMHG | HEIGHT: 67 IN | BODY MASS INDEX: 45.99 KG/M2 | WEIGHT: 293 LBS | TEMPERATURE: 100 F | DIASTOLIC BLOOD PRESSURE: 97 MMHG

## 2020-11-03 DIAGNOSIS — Z20.822 SUSPECTED COVID-19 VIRUS INFECTION: Primary | ICD-10-CM

## 2020-11-03 DIAGNOSIS — Z02.9 ADMINISTRATIVE ENCOUNTER: Primary | ICD-10-CM

## 2020-11-03 PROCEDURE — 71045 X-RAY EXAM CHEST 1 VIEW: CPT | Performed by: EMERGENCY MEDICINE

## 2020-11-03 PROCEDURE — 94664 DEMO&/EVAL PT USE INHALER: CPT

## 2020-11-03 PROCEDURE — 99284 EMERGENCY DEPT VISIT MOD MDM: CPT

## 2020-11-03 PROCEDURE — 99283 EMERGENCY DEPT VISIT LOW MDM: CPT

## 2020-11-03 RX ORDER — DEXAMETHASONE SODIUM PHOSPHATE 4 MG/ML
8 VIAL (ML) INJECTION ONCE
Status: COMPLETED | OUTPATIENT
Start: 2020-11-03 | End: 2020-11-03

## 2020-11-03 RX ORDER — ALBUTEROL SULFATE 90 UG/1
2 AEROSOL, METERED RESPIRATORY (INHALATION) EVERY 4 HOURS PRN
Qty: 1 INHALER | Refills: 0 | Status: SHIPPED | OUTPATIENT
Start: 2020-11-03 | End: 2021-11-03

## 2020-11-03 RX ORDER — AZITHROMYCIN 250 MG/1
TABLET, FILM COATED ORAL
Qty: 6 TABLET | Refills: 0 | Status: SHIPPED | OUTPATIENT
Start: 2020-11-03 | End: 2020-11-08

## 2020-11-03 NOTE — ED PROVIDER NOTES
Patient Seen in: [facility] In [place]      History   Patient presents with:  Difficulty Breathing    Stated Complaint: TL- INDU, wheezing, low grade fever, cough    HPI    59-year-old female comes to the hospital complaint of having d Temp (!) 71.6 °F (22 °C)   Temp src Temporal   SpO2 98 %   O2 Device None (Room air)       Current:/79   Pulse 100   Temp (!) 71.6 °F (22 °C) (Temporal)   Resp 22   Ht 170.2 cm (5' 7\")   Wt (!) 149.7 kg   LMP 05/18/2020   SpO2 98%   BMI 51.69 kg/m Clinical Impression:  Suspected COVID-19 virus infection  (primary encounter diagnosis)    Disposition:  Discharge  11/3/2020 10:18 am    Follow-up:  Volney Epley, [address] [address] Bharti Dorsey  [phone]    Schedule an appointment as

## 2020-11-03 NOTE — PROGRESS NOTES
Patient to sent to car. Spoke with patient. Reports difficulty breathing, wheezing, low grade fever.  tested for COVID, pending. Discussed with patient that due to limitations in [facility], ER would be best.  Patient agreed.   She is an RN and feels co

## 2020-11-06 NOTE — ED NOTES
Pt notified of Alejandra results and informed to call MD or return if increased CP or increased Diff breathing

## 2020-11-08 ENCOUNTER — HOSPITAL ENCOUNTER (EMERGENCY)
Age: 45
Discharge: HOME | End: 2020-11-08
Attending: EMERGENCY MEDICINE
Payer: COMMERCIAL

## 2020-11-08 ENCOUNTER — APPOINTMENT (OUTPATIENT)
Dept: GENERAL RADIOLOGY | Age: 45
End: 2020-11-08
Attending: EMERGENCY MEDICINE
Payer: COMMERCIAL

## 2020-11-08 VITALS
DIASTOLIC BLOOD PRESSURE: 85 MMHG | TEMPERATURE: 99 F | SYSTOLIC BLOOD PRESSURE: 130 MMHG | BODY MASS INDEX: 52 KG/M2 | WEIGHT: 293 LBS | OXYGEN SATURATION: 96 % | RESPIRATION RATE: 25 BRPM | HEART RATE: 102 BPM

## 2020-11-08 DIAGNOSIS — U07.1 COVID-19 VIRUS INFECTION: Primary | ICD-10-CM

## 2020-11-08 PROCEDURE — 85379 FIBRIN DEGRADATION QUANT: CPT | Performed by: EMERGENCY MEDICINE

## 2020-11-08 PROCEDURE — 99285 EMERGENCY DEPT VISIT HI MDM: CPT

## 2020-11-08 PROCEDURE — 85025 COMPLETE CBC W/AUTO DIFF WBC: CPT | Performed by: EMERGENCY MEDICINE

## 2020-11-08 PROCEDURE — 80053 COMPREHEN METABOLIC PANEL: CPT | Performed by: EMERGENCY MEDICINE

## 2020-11-08 PROCEDURE — 99284 EMERGENCY DEPT VISIT MOD MDM: CPT

## 2020-11-08 PROCEDURE — 71045 X-RAY EXAM CHEST 1 VIEW: CPT | Performed by: EMERGENCY MEDICINE

## 2020-11-08 PROCEDURE — 93010 ELECTROCARDIOGRAM REPORT: CPT

## 2020-11-08 PROCEDURE — 84484 ASSAY OF TROPONIN QUANT: CPT | Performed by: EMERGENCY MEDICINE

## 2020-11-08 PROCEDURE — 93005 ELECTROCARDIOGRAM TRACING: CPT

## 2020-11-08 PROCEDURE — 36415 COLL VENOUS BLD VENIPUNCTURE: CPT

## 2020-11-08 NOTE — ED PROVIDER NOTES
Patient Seen in: [facility] Emergency Department In [place]      History   Patient presents with:  Cough/URI  Chest Pain Angina    Stated Complaint: +covid, chest pain, SOB    HPI    78-year-old female presents emergency department who had a positive Covi (Room air)       Current:/85   Pulse 102   Temp 99 °F (37.2 °C) (Temporal)   Resp 25   Wt (!) 149.7 kg   LMP 05/18/2020   SpO2 96%   BMI 51.69 kg/m²         Physical Exam    All measures to prevent infection transmission during my interaction with th 1.41 (*)     Lymphocyte Absolute 0.68 (*)     All other components within normal limits   TROPONIN I - Normal   D-DIMER - Normal   CBC WITH DIFFERENTIAL WITH PLATELET    Narrative:      The following orders were created for panel order CBC WITH DIFFERENTIAL 11/08/2020 at 9:29 AM       Xr Chest Ap Portable  (cpt=71045)    Result Date: 11/3/2020  PROCEDURE:  XR CHEST AP PORTABLE  (CPT=71045)  TECHNIQUE:  AP chest radiograph was obtained.   COMPARISON:  Gloria Swan, XR, XR CHEST PA + LAT CHEST (CPT=71046), 1 result, a pulse oximetry device was given to the patient/caregiver and clear instructions relayed on how to use the device, interpret the results and when to return if worse. The patient/caregiver verbalized understanding of the instructions.            Med

## 2020-11-08 NOTE — ED INITIAL ASSESSMENT (HPI)
Test + for covid 4 days ago. Symptoms began 7 days ago. Now c/o chest abd back \"pain and burning\" with increased SOB.

## 2020-11-09 ENCOUNTER — HOSPITAL ENCOUNTER (INPATIENT)
Facility: HOSPITAL | Age: 45
LOS: 4 days | Discharge: HOME | DRG: 177 | End: 2020-11-13
Attending: EMERGENCY MEDICINE | Admitting: HOSPITALIST
Payer: COMMERCIAL

## 2020-11-09 ENCOUNTER — TELEMEDICINE (OUTPATIENT)
Dept: FAMILY MEDICINE CLINIC | Facility: CLINIC | Age: 45
End: 2020-11-09
Payer: COMMERCIAL

## 2020-11-09 ENCOUNTER — APPOINTMENT (OUTPATIENT)
Dept: GENERAL RADIOLOGY | Facility: HOSPITAL | Age: 45
DRG: 177 | End: 2020-11-09
Attending: EMERGENCY MEDICINE
Payer: COMMERCIAL

## 2020-11-09 DIAGNOSIS — U07.1 COVID-19: Primary | ICD-10-CM

## 2020-11-09 DIAGNOSIS — J12.82 PNEUMONIA DUE TO COVID-19 VIRUS: Primary | ICD-10-CM

## 2020-11-09 DIAGNOSIS — U07.1 PNEUMONIA DUE TO COVID-19 VIRUS: Primary | ICD-10-CM

## 2020-11-09 DIAGNOSIS — R09.02 HYPOXIA: ICD-10-CM

## 2020-11-09 PROBLEM — D69.6 THROMBOCYTOPENIA: Status: ACTIVE | Noted: 2020-11-09

## 2020-11-09 PROBLEM — E87.1 HYPONATREMIA: Status: ACTIVE | Noted: 2020-11-09

## 2020-11-09 PROBLEM — D69.6 THROMBOCYTOPENIA (HCC): Status: ACTIVE | Noted: 2020-11-09

## 2020-11-09 PROCEDURE — 71045 X-RAY EXAM CHEST 1 VIEW: CPT | Performed by: EMERGENCY MEDICINE

## 2020-11-09 PROCEDURE — 99215 OFFICE O/P EST HI 40 MIN: CPT | Performed by: FAMILY MEDICINE

## 2020-11-09 PROCEDURE — 99223 1ST HOSP IP/OBS HIGH 75: CPT | Performed by: INTERNAL MEDICINE

## 2020-11-09 RX ORDER — SODIUM CHLORIDE 9 MG/ML
INJECTION, SOLUTION INTRAVENOUS CONTINUOUS
Status: CANCELLED | OUTPATIENT
Start: 2020-11-09 | End: 2020-11-09

## 2020-11-09 RX ORDER — ONDANSETRON 2 MG/ML
4 INJECTION INTRAMUSCULAR; INTRAVENOUS EVERY 6 HOURS PRN
Status: DISCONTINUED | OUTPATIENT
Start: 2020-11-09 | End: 2020-11-13

## 2020-11-09 RX ORDER — ENOXAPARIN SODIUM 100 MG/ML
0.5 INJECTION SUBCUTANEOUS DAILY
Status: DISCONTINUED | OUTPATIENT
Start: 2020-11-09 | End: 2020-11-13

## 2020-11-09 RX ORDER — ACETAMINOPHEN 325 MG/1
650 TABLET ORAL EVERY 6 HOURS PRN
Status: DISCONTINUED | OUTPATIENT
Start: 2020-11-09 | End: 2020-11-13

## 2020-11-09 RX ORDER — ALBUTEROL SULFATE 90 UG/1
2 AEROSOL, METERED RESPIRATORY (INHALATION) EVERY 4 HOURS PRN
Status: DISCONTINUED | OUTPATIENT
Start: 2020-11-09 | End: 2020-11-13

## 2020-11-09 NOTE — ED PROVIDER NOTES
Patient Seen in: [facility] Emergency Department      History   Patient presents with:  Covid  Difficulty Breathing    Stated Complaint: +COVID- Pt c/o INDU    HPI  39-year-old with a history of asthma, sleep apnea, atrial flutter, obesity.   1 week a 97 °F (36.1 °C)   Temp src    SpO2 94 %   O2 Device None (Room air)       Current:/57   Pulse 108   Temp 97 °F (36.1 °C)   Resp 26   Ht 170.2 cm (5' 7\")   Wt (!) 149.7 kg   LMP 05/18/2020   SpO2 96%   BMI 51.69 kg/m²         Physical Exam    General 107  Rhythm: Sinus Rhythm  Reading: No ischemic changes or dysrhythmia abnormal due to rate         Chest x-ray: Stable slight opacity left lung base laterally. Stable discoid lateral opacity along the right  Minor fissure.   CONCLUSION:  Shallow inspirati

## 2020-11-09 NOTE — PROGRESS NOTES
Alberto Matson verbally consents to a threadsy on 11/09/20. Time spent:22 min. CC: SOB, cough, Covid positive. HPI:   Alberto Matson is a 53year old female who presents for cough  for  5  days.  Patient report the lungs every 6 (six) hours as needed.         Past Medical History:   Diagnosis Date   • Atrial flutter ([facility] [address]    • Extrinsic asthma, unspecified    • Migraine, unspecified, without mention of intractable migraine without mention of status migrainosus to the plan. The patient is asked to return if sx's persist or worsen.

## 2020-11-09 NOTE — ED INITIAL ASSESSMENT (HPI)
Patient COVID positive last Thursday, c/o increased INDU. Patient was seen at the PED yesterday and sent home with a pulse ox. Patient had televisit with PMD who instructed patient to go to ED.

## 2020-11-09 NOTE — H&P
[facility]                                                               History & Physical         Baylee Guardado Patient Status:  Emergency    1975 MRN [id number]   Location [address] [facility] Attending Lori Carbone, of Onset   • Other (CVA) Paternal Grandmother    • Other (CVA) Paternal Grandfather    • Other (CVA) Maternal Grandmother    • Other (leukemia) Father         also had HEP C   • Hypertension Mother    • Other (hyperlipidemia) Mother       Reviewed    Kareen Orona 11/09/2020    CA 8.6 11/09/2020    ALB 3.5 11/09/2020    ALKPHO 68 11/09/2020    BILT 0.9 11/09/2020    TP 7.7 11/09/2020    AST 91 11/09/2020    ALT 86 11/09/2020    DDIMER 0.44 11/09/2020    CRP 0.87 11/09/2020       No results for input(s): PTP, INR in with a BMI of 51.69  3. Mild intermittent asthma  1. Continue albuterol MDI as needed  4. History of atrial flutter  5. Hyponatremia  1. Follow BMP  6. Mild elevated liver function tests  1. Follow CMP  7.  Leukopenia, thrombocytopenia possibly due to Covid

## 2020-11-10 PROCEDURE — 99232 SBSQ HOSP IP/OBS MODERATE 35: CPT | Performed by: HOSPITALIST

## 2020-11-10 PROCEDURE — XW033E5 INTRODUCTION OF REMDESIVIR ANTI-INFECTIVE INTO PERIPHERAL VEIN, PERCUTANEOUS APPROACH, NEW TECHNOLOGY GROUP 5: ICD-10-PCS | Performed by: INTERNAL MEDICINE

## 2020-11-10 PROCEDURE — 05HY33Z INSERTION OF INFUSION DEVICE INTO UPPER VEIN, PERCUTANEOUS APPROACH: ICD-10-PCS | Performed by: HOSPITALIST

## 2020-11-10 RX ORDER — ECHINACEA PURPUREA EXTRACT 125 MG
1 TABLET ORAL
Status: DISCONTINUED | OUTPATIENT
Start: 2020-11-10 | End: 2020-11-13

## 2020-11-10 NOTE — PLAN OF CARE
Assumed care for this pt upon admission to room 323. Pt alert and oriented x4, pt short of breath on exertion but oxygen saturations in mid to upper 90's. Pt on 2 L oxygen nasal canula at night, pt wears cpap at home.  vss no fever at this time, plan of car

## 2020-11-10 NOTE — PAYOR COMM NOTE
--------------  ADMISSION REVIEW     Payor: [facility] LABOR FUND PPO  Subscriber #:  [id number]  Authorization Number: [id number]    Admit date: 11/9/20  Admit time: 2003       Admitting Physician: Jason Magaña DO  Attending Physician:  Du Falcon •      • CHOLECYSTECTOMY     • D & C     • ENDOMETRIAL ABLATION     • LEG/ANKLE SURGERY PROC UNLISTED  98    ankle                    Social History    Tobacco Use      Smoking status: Never Smoker      Smokeless tobacco: Never Used CBC WITH DIFFERENTIAL WITH PLATELET    Narrative: The following orders were created for panel order CBC WITH DIFFERENTIAL WITH PLATELET.   Procedure                               Abnormality         Status                     --------- Pneumonia due to COVID-19 virus U07.1, J12.89 11/9/2020 Unknown    Thrombocytopenia ([facility] D69.6 11/9/2020 Yes           Signed by Israel Weiss MD on 11/9/2020  3:39 PM            H&P - H&P Note      H&P signed by Mariana Navarro MD at 11/9/2020  6:30 AHI 18 REM AHI 45 Supine AHI 49 Sao2 Freddy 69%    • Unspecified asthma(493.90)    • Unspecified gastritis and gastroduodenitis without mention of hemorrhage    • Unspecified sleep apnea    • Vitamin D deficiency        Past Surgical History:   Procedure L Musculoskeletal: Full range of motion of all extremities. No swelling noted. Integument: No lesions. No erythema. Psychiatric: Appropriate mood and affect.       Diagnostic Data:      Laboratory Data:   Lab Results   Component Value Date    WBC 2.2 11/09 Stable discoid opacity along the right minor fissure. Stable prominent perihilar and basilar interstitial markings. Dictated by (CST): Evelyn Swan MD on 11/09/2020 at 3:02 PM         ASSESSMENT / PLAN:     1.  COVID-19 pneumonia with shortn Reason for Consultation:  COVID-19 pneuomonia     History of Present Illness:  Aidan Salvador is a a(n) 53year old female  is a  and had symptoms tested positive for covid.  Patient developed symptoms on 11/2, and tested positi •  [START ON 11/11/2020] remdesivir 100 mg in sodium chloride 0.9% 250 mL IVPB, 100 mg, Intravenous, Q24H  •  Albuterol Sulfate  (90 Base) MCG/ACT inhaler 2 puff, 2 puff, Inhalation, Q4H PRN  •  sodium chloride 0.9% IV bolus 500 mL, 500 mL, Intraven CREATSERUM 1.04* 1.02   GFRAA 74 76   GFRNAA 65 66   CA 8.7 8.6   ALB 3.4 3.5    133*   K 3.6 3.8    101   CO2 26.0 26.0   ALKPHO 73 68   AST 54* 91*   ALT 64* 86*   BILT 0.9 0.9   TP 7.7 7.7                  Lab Results   Component Value Date -may benefit from Remdesivir in terms of improvement of symptoms.  If improved can discharge in am           Dewain Epley, MD  [facility]        11/10  Ewelina Lambert DO   Physician   Hospitalist   Progress Notes Lab 11/08/20  0818 11/09/20  1440   * 142*   BUN 9 10   CREATSERUM 1.04* 1.02   GFRAA 74 76   GFRNAA 65 66   CA 8.7 8.6   ALB 3.4 3.5    133*   K 3.6 3.8    101   CO2 26.0 26.0   ALKPHO 73 68   AST 54* 91*   ALT 64* 86*   BILT 0.9 0.9 Date Action Dose Route User    11/9/2020 2337 Given 650 mg Oral Candieadriana Bernard RN      Enoxaparin Sodium (LOVENOX) 80 MG/0.8ML injection 70 mg     Date Action Dose Route User    11/10/2020 0906 Given 70 mg Subcutaneous (Left Lower Abdomen) Elaina Lake,

## 2020-11-10 NOTE — PROGRESS NOTES
[facility] Note:  Anticoagulation Weight Dose Adjustment for enoxaparin (LOVENOX)    Lorraine Kay is a 53year old patient who has been prescribed enoxaparin (LOVENOX) 40 mg every 24 hours.       Estimated Creatinine Clearance: 67 mL/min (based

## 2020-11-10 NOTE — PLAN OF CARE
Problem: RESPIRATORY - ADULT  Goal: Achieves optimal ventilation and oxygenation  Description: INTERVENTIONS:  - Assess for changes in respiratory status  - Assess for changes in mentation and behavior  - Position to facilitate oxygenation and minimize r

## 2020-11-10 NOTE — PROGRESS NOTES
[facility]  Progress Note     Fabian Jacklynjordyn Granados Patient Status:  Inpatient    1975 MRN [id number]   [facility] 3NW-A Attending Esvin Morgan [address] Day # 1 PCP Kerline Singh MD     Chief Complaint: SOB    S PTP, INR in the last 168 hours. Recent Labs   Lab 11/08/20  0818 11/09/20  2029 11/10/20  0142   TROP <0.045 <0.045 <0.045   CK  --  97  --             Imaging: Imaging data reviewed in Epic.     Medications:   • enoxaparin  0.5 mg/kg Subcutaneous Daily

## 2020-11-10 NOTE — PLAN OF CARE
Spoke with Dr Conor Gomez regarding patient with bloody nose with clots. It stopped bleeding at present after ice and pressure.  Will continue to monitor

## 2020-11-10 NOTE — CONSULTS
INFECTIOUS DISEASE CONSULTATION    Baylee Glass Patient Status:  Inpatient    1975 MRN [id number]   [facility] 3NW-A Attending Hayley [facility] Day # 1 PCP HIVES    Comment:ASA TABS  Morphine                HIVES    Comment:Derivatives    Medications:    Current Facility-Administered Medications:   •  remdesivir 200 mg in sodium chloride 0.9% 250 mL IVPB, 200 mg, Intravenous, Once  •  [START ON 11/11/2020] HGB 13.5   HCT 41.6   MCV 81.4   MCH 26.4   MCHC 32.5   RDW 13.6   NEPRELIM 0.93*   WBC 2.2*   .0*       Recent Labs   Lab 11/08/20  0818 11/09/20  1440   * 142*   BUN 9 10   CREATSERUM 1.04* 1.02   GFRAA 74 76   GFRNAA 65 66   CA 8.7 8.6 tested pos on 11/3  Dyspnea with exertion, 02 sats low 90s at rest  -may benefit from Remdesivir in terms of improvement of symptoms.  If improved can discharge in am        Adonis Reynolds MD  [address] [place] [address]  [phone]

## 2020-11-11 PROCEDURE — 99232 SBSQ HOSP IP/OBS MODERATE 35: CPT | Performed by: HOSPITALIST

## 2020-11-11 NOTE — PROGRESS NOTES
[facility]  Progress Note     Nadya De Paz Roberta Patient Status:  Inpatient    1975 MRN [id number]   [facility] 3NW-A Attending Jose M Gamboa [address] Day # 2 PCP Fred Lakhani MD     Chief Complaint: SOB    S last 168 hours. Recent Labs   Lab 11/08/20  0818 11/09/20  2029 11/10/20  0142   TROP <0.045 <0.045 <0.045   CK  --  97  --             Imaging: Imaging data reviewed in Epic.     Medications:   • remdesivir IVPB  100 mg Intravenous Q24H   • enoxaparin

## 2020-11-11 NOTE — PAYOR COMM NOTE
--------------  CONTINUED STAY REVIEW-------REQUESTING ADDITIONAL DAY 11/11      Payor: [employer]  Subscriber #:  [id number]  Authorization Number: [id number]    Admit date: 11/9/20  Admit time: 2003    Admitting Physician: Ramon Mo MCV 82.7 80.7 81.4 83.0   .0* 122.0* 132.0* 121.0*               Recent Labs   Lab 11/08/20  0818 11/09/20  1440 11/11/20  0603   * 142* 158*   BUN 9 10 12   CREATSERUM 1.04* 1.02 0.83   GFRAA 74 76 98   GFRNAA 65 66 85   CA 8.7 8.6 8.5   ALB MEDICATIONS ADMINISTERED IN LAST 1 DAY:  acetaminophen (TYLENOL) tab 650 mg     Date Action Dose Route User    11/10/2020 0204 Given 650 mg Oral Sarah Schilling RN      Enoxaparin Sodium (LOVENOX) 80 MG/0.8ML injection 70 mg     Date Action Dose Route Us

## 2020-11-11 NOTE — PROGRESS NOTES
[facility]                INFECTIOUS DISEASE PROGRESS NOTE    Baylee Montemayor Patient Status:  Inpatient    1975 MRN [id number]   [facility] 3NW-A Attending Inés Morris MD   Hosp Day # 2 PCP Kathie Gamboa MD 55   AST 54* 91* 69*   ALT 64* 86* 76*   BILT 0.9 0.9 0.8   TP 7.7 7.7 7.1       No results found for: [address] Encounter on 11/09/20   1.  BLOOD CULTURE     Status: None (Preliminary result)    Collection Time: 11/09/20  8:29 PM    S

## 2020-11-11 NOTE — PLAN OF CARE
Pt is oriented x4, and drowsy. In the AM on RA, pt was at 90% SPO2, and got 500 on the IS. As the day progressed, pt sats went to 93% on RA, and she got up to 750 on the IS. MD notified about improvement. Afebrile. Up with assistance. Tolerating diet.  C/O

## 2020-11-11 NOTE — PLAN OF CARE
Assumed patient care at [address] [place] [address]. Vital signs stable. Patient alert and oriented x 4. Patient denies pain. Patient gets tachycardic in 120's with ambulation. Patient on 2L overnight for sleep apnea. Patient given tylenol for fever.    Problem: RESPIRATORY

## 2020-11-12 PROCEDURE — 99232 SBSQ HOSP IP/OBS MODERATE 35: CPT | Performed by: HOSPITALIST

## 2020-11-12 RX ORDER — POTASSIUM CHLORIDE 20 MEQ/1
40 TABLET, EXTENDED RELEASE ORAL EVERY 4 HOURS
Status: COMPLETED | OUTPATIENT
Start: 2020-11-12 | End: 2020-11-12

## 2020-11-12 NOTE — PLAN OF CARE
Problem: Patient/Family Goals  Goal: Patient/Family Long Term Goal  Description: Patient's Long Term Goal:   Discharge to home    Interventions:  - Follow plan of care  - See additional Care Plan goals for specific interventions  Outcome: Progressing  Go

## 2020-11-12 NOTE — PROGRESS NOTES
[facility]  Progress Note     José Manuel Granados Patient Status:  Inpatient    1975 MRN [id number]   [facility] 3NW-A Attending Silvia Finley MD   Hosp Day # 3 PCP Alvaro Thomas MD     Chief Complaint: SOB    S: Patie mg/dL). No results for input(s): PTP, INR in the last 168 hours.          COVID-19 Lab Results    COVID-19  Lab Results   Component Value Date    COVID19 Detected (A) 11/03/2020       Pro-Calcitonin  Recent Labs   Lab 11/09/20  1440   PCT 0.05       Card

## 2020-11-12 NOTE — PLAN OF CARE
Problem: Covid +, PNA  Data: Ax04. Telemetry, NSR, ST w/ activity. , placed on 2L NC overnight. Desats on room air, hx. SELINA. Afebrile. Denied pain. Continent , frequent diarrhea, negative for C-diff. Ambulatory with SBA. Regular diet.  Verbalized being n

## 2020-11-13 ENCOUNTER — TELEPHONE (OUTPATIENT)
Dept: FAMILY MEDICINE CLINIC | Facility: CLINIC | Age: 45
End: 2020-11-13

## 2020-11-13 VITALS
WEIGHT: 293 LBS | DIASTOLIC BLOOD PRESSURE: 73 MMHG | TEMPERATURE: 99 F | BODY MASS INDEX: 45.99 KG/M2 | RESPIRATION RATE: 16 BRPM | OXYGEN SATURATION: 94 % | SYSTOLIC BLOOD PRESSURE: 125 MMHG | HEART RATE: 91 BPM | HEIGHT: 67 IN

## 2020-11-13 PROCEDURE — 99239 HOSP IP/OBS DSCHRG MGMT >30: CPT | Performed by: HOSPITALIST

## 2020-11-13 NOTE — PROGRESS NOTES
NURSING DISCHARGE NOTE    Discharged Home via Wheelchair. Accompanied by Family member  Belongings Taken by patient/family. Discharge instructions discussed with patient, she verbalized understanding.

## 2020-11-13 NOTE — PAYOR COMM NOTE
--------------  CONTINUED STAY REVIEW-------REQUESTING ADDITIONAL DAYS 11/12, 11/13      Payor: [employer] PPO  Subscriber #:  [id number]  Authorization Number: [id number]    Admit date: 11/9/20  Admit time: 2003    Admitting Physician: Sharee Obregon WBC 2.3* 2.2* 2.2* 2.9*   HGB 13.3 13.3 13.5 12.4   MCV 82.7 80.7 81.4 83.0   .0* 122.0* 132.0* 121.0*               Recent Labs   Lab 11/08/20  0818 11/09/20  1440 11/11/20  0603   * 142* 158*   BUN 9 10 12   CREATSERUM 1.04* 1.02 0.83   GFR 8. Leukopenia, thrombocytopenia possibly due to Covid  1. Follow CBC  9. Obstructive sleep apnea on CPAP  1.  SELINA protocol     Plan of care: replace K, check walking o2 sats, dc in next 24 hours     Quality:  · DVT Prophylaxis: lovenox  · CODE status: full Musculoskeletal: Moves all extremities.   Extremities: No edema.     Diagnostic Data:       Labs:          Recent Labs   Lab 11/08/20  0818 11/09/20  1429 11/10/20  0802 11/11/20  0558 11/12/20  1310   WBC 2.3* 2.2* 2.2* 2.9* 2.9*   HGB 13.3 13.3 13.5 12.4 2. Morbid obesity with a BMI of 51.69  3. Mild intermittent asthma  1. Continue albuterol MDI as needed  4. History of atrial flutter  5. Hypokalemia  6. Hyponatremia  7. Mild elevated liver function tests  8.  Leukopenia, thrombocytopenia possibly due to C

## 2020-11-13 NOTE — PROGRESS NOTES
[facility]                INFECTIOUS DISEASE PROGRESS NOTE    Baylee Ni Patient Status:  Inpatient    1975 MRN [id number]   [facility] 3NW-A Attending Marino Ramirez MD   Hosp Day # 4 PCP Lolita Plunkett MD 64* 63*   BILT 0.8 0.5 0.5   TP 7.1 7.2 7.2       No results found for: [facility] Encounter on 11/09/20   1.  BLOOD CULTURE     Status: None (Preliminary result)    Collection Time: 11/09/20  8:29 PM    Specimen: Blood,peripheral   Res

## 2020-11-13 NOTE — TELEPHONE ENCOUNTER
Pt's  dropped off [facility] paperwork for completion. Copy made and filed in the black accordion, original forwarded to Triage.

## 2020-11-13 NOTE — PLAN OF CARE
Patient 92% at room air, no SOB noted at rest but patient verbalized having SOB with exertion. K+ 3.8. Lovenox  for anticoagulation. On and off productive cough with yellow secretions in scant amount.   Dr. Levi Neumann seen and examined patient this morning,

## 2020-11-13 NOTE — PLAN OF CARE
Patient c/o short of breath on exertion ,o2 sats mid 90's on room air during the day ,use 2 l o2 at night ,has on and off productive cough ,robitussin given as needed ,on remdesvir daily ,tele with sinus rhythm ,tachycardic with activity ,blood pressure st

## 2020-11-13 NOTE — PROGRESS NOTES
Patient had a shower, mild SOB after getting out of the shower room, oxygen saturation 92% at room and went up to 94% at rest.

## 2020-11-15 NOTE — DISCHARGE SUMMARY
[facility]  DISCHARGE SUMMARY     [address] Patient Status:  Inpatient    1975 MRN [id number]   [facility] 3NW-A Attending No att. providers found   [facility] Day # 4 PCP Holly Molina MD     Date of Admission: 1 disease    Discharge Medication List:     Discharge Medications      CONTINUE taking these medications      Instructions Prescription details   Albuterol Sulfate  (90 Base) MCG/ACT Aers      Inhale 2 puffs into the lungs every 4 (four) hours as need MD    Time spent:  > 30 minutes

## 2020-11-16 ENCOUNTER — OFFICE VISIT (OUTPATIENT)
Dept: FAMILY MEDICINE CLINIC | Facility: CLINIC | Age: 45
End: 2020-11-16
Payer: COMMERCIAL

## 2020-11-16 ENCOUNTER — PATIENT OUTREACH (OUTPATIENT)
Dept: CASE MANAGEMENT | Age: 45
End: 2020-11-16

## 2020-11-16 DIAGNOSIS — U07.1 COVID-19: Primary | ICD-10-CM

## 2020-11-16 DIAGNOSIS — Z02.9 ENCOUNTERS FOR UNSPECIFIED ADMINISTRATIVE PURPOSE: ICD-10-CM

## 2020-11-16 PROCEDURE — 99214 OFFICE O/P EST MOD 30 MIN: CPT | Performed by: FAMILY MEDICINE

## 2020-11-16 PROCEDURE — 99072 ADDL SUPL MATRL&STAF TM PHE: CPT | Performed by: FAMILY MEDICINE

## 2020-11-16 PROCEDURE — 1111F DSCHRG MED/CURRENT MED MERGE: CPT | Performed by: FAMILY MEDICINE

## 2020-11-16 RX ORDER — AZITHROMYCIN 250 MG/1
TABLET, FILM COATED ORAL
Qty: 6 TABLET | Refills: 0 | Status: SHIPPED | OUTPATIENT
Start: 2020-11-16 | End: 2020-11-21

## 2020-11-16 RX ORDER — METHYLPREDNISOLONE 4 MG/1
TABLET ORAL
Qty: 1 KIT | Refills: 0 | Status: SHIPPED | OUTPATIENT
Start: 2020-11-16 | End: 2020-12-16 | Stop reason: ALTCHOICE

## 2020-11-16 NOTE — PAYOR COMM NOTE
--------------  DISCHARGE REVIEW    Payor[facility]  Subscriber #:  [id number]  Authorization Number: [id number]    Admit date: 11/9/20  Admit time:  2003  Discharge Date: 11/13/2020  3:50 PM     Admitting Physician: Talita Hardin generally weak. Has sinus headache.          Brief Synopsis: Patient is a 15-year-old female admitted with Covid pneumonia. She was placed on remdesivir and had 3 days during her hospitalization. She remained afebrile. Her oxygenation improved.   She wa later time, depending upon your insurance. As always, your health is our priority. Vital signs:       Physical Exam:    General: No acute distress. Respiratory: Clear to auscultation bilaterally. No wheezes. No rhonchi.   Cardiovasc

## 2020-11-16 NOTE — PROGRESS NOTES
Stephanie Rush verbally consents to a [employer] on 11/09/20. Time spent:21 min. CC: SOB, cough, Covid positive. HPI:   Stephanie Rush is a 53year old female who presents for cough  for  12  days.  Patient repor • Hypertension Mother    • Other (hyperlipidemia) Mother       Social History    Tobacco Use      Smoking status: Never Smoker      Smokeless tobacco: Never Used    Alcohol use: No      Alcohol/week: 0.0 standard drinks    Drug use: No        REVIEW OF S

## 2020-11-20 ENCOUNTER — TELEPHONE (OUTPATIENT)
Dept: FAMILY MEDICINE CLINIC | Facility: CLINIC | Age: 45
End: 2020-11-20

## 2020-11-20 NOTE — TELEPHONE ENCOUNTER
Pt calling for status of fmla form completion, states her spouse dropped it off, she is unsure of date of dropoff. Pt states that during virtual visit provider told her that it would be completed on Thursday 11-19 when provider was in office.   See pt mess

## 2020-11-20 NOTE — TELEPHONE ENCOUNTER
Pt feeling a little better, trying to re-gain strength & breathing. FMLA completed & faxed back to Akash Cheema at [phone] per pt's request, confirmation received. Pt requests copy be mailed to her home also. Forms placed in mail for pt.

## 2020-11-23 ENCOUNTER — VIRTUAL PHONE E/M (OUTPATIENT)
Dept: FAMILY MEDICINE CLINIC | Facility: CLINIC | Age: 45
End: 2020-11-23
Payer: COMMERCIAL

## 2020-11-23 DIAGNOSIS — U07.1 COVID-19: Primary | ICD-10-CM

## 2020-11-23 PROCEDURE — 99214 OFFICE O/P EST MOD 30 MIN: CPT | Performed by: FAMILY MEDICINE

## 2020-11-23 PROCEDURE — 1111F DSCHRG MED/CURRENT MED MERGE: CPT | Performed by: FAMILY MEDICINE

## 2020-11-23 NOTE — PROGRESS NOTES
CC:Covid 19 f/u. HPI:    Fatigue last few months, worse with physical activities and better with rest, moderate, worsening. Lots of sweating. Pt easily falls asleep but not staying asleep. Sweats.     Current Outpatient Medications   Medication Sig throats. NECK: no pain  CHEST: no chest pains, no SOB on exertion or at rest no palpations. No edema, no cough. NEURO: no seizures, no confusion, no weakness, no abnormal sensation, no headaches.   GI: no nausea or vomiting, no abdominal pain  LYMPH: no te

## 2020-11-30 ENCOUNTER — TELEMEDICINE (OUTPATIENT)
Dept: FAMILY MEDICINE CLINIC | Facility: CLINIC | Age: 45
End: 2020-11-30
Payer: COMMERCIAL

## 2020-11-30 DIAGNOSIS — R06.02 SOB (SHORTNESS OF BREATH): ICD-10-CM

## 2020-11-30 DIAGNOSIS — U07.1 COVID-19: ICD-10-CM

## 2020-11-30 DIAGNOSIS — R07.89 CHEST PAIN, ATYPICAL: ICD-10-CM

## 2020-11-30 DIAGNOSIS — R05.9 COUGH: Primary | ICD-10-CM

## 2020-11-30 PROCEDURE — 99214 OFFICE O/P EST MOD 30 MIN: CPT | Performed by: FAMILY MEDICINE

## 2020-11-30 RX ORDER — DEXTROMETHORPHAN HYDROBROMIDE AND PROMETHAZINE HYDROCHLORIDE 15; 6.25 MG/5ML; MG/5ML
5 SYRUP ORAL 4 TIMES DAILY PRN
Qty: 120 ML | Refills: 0 | Status: SHIPPED | OUTPATIENT
Start: 2020-11-30 | End: 2020-12-07

## 2020-11-30 NOTE — PROGRESS NOTES
CC:Covid 19 f/u. HPI:    Pt has SOB and chest pain in the end of inspiration, started few days ago, moderate, worsening with walking as well and breathing. Pt feels worse again. Pt easily falls asleep but not staying asleep. Sweats.     Current Outp no weight loss or gain, no fever, no heat or cold intolerance. No night sweats  EYES: no vision issues  HEENT: .No tinnitus, no jaw pains, no sore throats.   NECK: no pain  CHEST: no orthopnea  NEURO: no seizures, no confusion, no weakness, no abnormal sensa no

## 2020-12-01 ENCOUNTER — PATIENT MESSAGE (OUTPATIENT)
Dept: FAMILY MEDICINE CLINIC | Facility: CLINIC | Age: 45
End: 2020-12-01

## 2020-12-01 NOTE — TELEPHONE ENCOUNTER
From: Jose Granados  To: Mana Fish MD  Sent: 12/1/2020 4:16 AM CST  Subject: Visit Follow-up Question    Good morning,     I called [facility] to schedule the stat CT and this is the earliest appointment they had below.  Is is ok wit

## 2020-12-01 NOTE — TELEPHONE ENCOUNTER
Please see pt e-mail. Is 12/4 OK for CT Angio? Or should pt go to another [facility] location?

## 2020-12-04 ENCOUNTER — HOSPITAL ENCOUNTER (OUTPATIENT)
Dept: CT IMAGING | Age: 45
Discharge: HOME | End: 2020-12-04
Attending: FAMILY MEDICINE
Payer: COMMERCIAL

## 2020-12-04 DIAGNOSIS — U07.1 COVID-19: ICD-10-CM

## 2020-12-04 DIAGNOSIS — R06.02 SOB (SHORTNESS OF BREATH): ICD-10-CM

## 2020-12-04 DIAGNOSIS — R07.89 CHEST PAIN, ATYPICAL: ICD-10-CM

## 2020-12-04 PROCEDURE — 71275 CT ANGIOGRAPHY CHEST: CPT | Performed by: FAMILY MEDICINE

## 2020-12-07 ENCOUNTER — TELEMEDICINE (OUTPATIENT)
Dept: FAMILY MEDICINE CLINIC | Facility: CLINIC | Age: 45
End: 2020-12-07
Payer: COMMERCIAL

## 2020-12-07 DIAGNOSIS — U07.1 COVID-19: Primary | ICD-10-CM

## 2020-12-07 PROCEDURE — 99213 OFFICE O/P EST LOW 20 MIN: CPT | Performed by: FAMILY MEDICINE

## 2020-12-07 NOTE — PROGRESS NOTES
Pt feels not better, lots of chest pressure, difficulty breathing, SOB and fatigue. Symptoms last one month. Referral to Dr. Kiana Remy, pulmonologist.  12 min counseling about specialist visit, pt agrees.

## 2020-12-16 ENCOUNTER — OFFICE VISIT (OUTPATIENT)
Dept: FAMILY MEDICINE CLINIC | Facility: CLINIC | Age: 45
End: 2020-12-16
Payer: COMMERCIAL

## 2020-12-16 ENCOUNTER — LAB ENCOUNTER (OUTPATIENT)
Dept: LAB | Age: 45
End: 2020-12-16
Attending: FAMILY MEDICINE
Payer: COMMERCIAL

## 2020-12-16 VITALS
WEIGHT: 293 LBS | BODY MASS INDEX: 45.99 KG/M2 | TEMPERATURE: 97 F | DIASTOLIC BLOOD PRESSURE: 84 MMHG | HEIGHT: 67 IN | SYSTOLIC BLOOD PRESSURE: 142 MMHG | HEART RATE: 85 BPM | RESPIRATION RATE: 18 BRPM | OXYGEN SATURATION: 98 %

## 2020-12-16 DIAGNOSIS — Z00.00 LABORATORY EXAMINATION ORDERED AS PART OF A ROUTINE GENERAL MEDICAL EXAMINATION: ICD-10-CM

## 2020-12-16 DIAGNOSIS — R73.02 IGT (IMPAIRED GLUCOSE TOLERANCE): ICD-10-CM

## 2020-12-16 DIAGNOSIS — U07.1 COVID-19: ICD-10-CM

## 2020-12-16 DIAGNOSIS — Z12.31 ENCOUNTER FOR SCREENING MAMMOGRAM FOR BREAST CANCER: ICD-10-CM

## 2020-12-16 DIAGNOSIS — Z00.00 WELLNESS EXAMINATION: Primary | ICD-10-CM

## 2020-12-16 DIAGNOSIS — E66.01 CLASS 3 SEVERE OBESITY DUE TO EXCESS CALORIES WITHOUT SERIOUS COMORBIDITY WITH BODY MASS INDEX (BMI) OF 50.0 TO 59.9 IN ADULT (HCC): ICD-10-CM

## 2020-12-16 PROCEDURE — 36415 COLL VENOUS BLD VENIPUNCTURE: CPT | Performed by: FAMILY MEDICINE

## 2020-12-16 PROCEDURE — 83721 ASSAY OF BLOOD LIPOPROTEIN: CPT | Performed by: FAMILY MEDICINE

## 2020-12-16 PROCEDURE — 83036 HEMOGLOBIN GLYCOSYLATED A1C: CPT | Performed by: FAMILY MEDICINE

## 2020-12-16 PROCEDURE — 80061 LIPID PANEL: CPT | Performed by: FAMILY MEDICINE

## 2020-12-16 PROCEDURE — 3008F BODY MASS INDEX DOCD: CPT | Performed by: FAMILY MEDICINE

## 2020-12-16 PROCEDURE — 80050 GENERAL HEALTH PANEL: CPT | Performed by: FAMILY MEDICINE

## 2020-12-16 PROCEDURE — 3077F SYST BP >= 140 MM HG: CPT | Performed by: FAMILY MEDICINE

## 2020-12-16 PROCEDURE — 3079F DIAST BP 80-89 MM HG: CPT | Performed by: FAMILY MEDICINE

## 2020-12-16 PROCEDURE — 99396 PREV VISIT EST AGE 40-64: CPT | Performed by: FAMILY MEDICINE

## 2020-12-16 PROCEDURE — 84439 ASSAY OF FREE THYROXINE: CPT | Performed by: FAMILY MEDICINE

## 2020-12-16 RX ORDER — ASPIRIN 81 MG/1
81 TABLET, CHEWABLE ORAL DAILY
COMMUNITY
End: 2021-03-31 | Stop reason: CLARIF

## 2020-12-16 NOTE — PROGRESS NOTES
HPI:     Silvina Lozoya is a 50year old female who presents for an Annual Health Visit. Diagnosed w/ COVID19 in 11/20. Still having some fatigue and intermittent dyspnea. Has h/o mild intermittent asthma - on albuterol as needed.  Has upcomi Maternal Grandmother    • Other (leukemia) Father         also had HEP C   • Hypertension Mother    • Other (hyperlipidemia) Mother       SOCIAL HISTORY:   Social History    Tobacco Use      Smoking status: Never Smoker      Smokeless tobacco: Never Used normal; no masses,  no organomegaly  Pelvic: deferred  Back: negative  Extremities: extremities normal, atraumatic, no cyanosis or edema  Pulses: 2+ and symmetric  Skin: Skin color, texture, turgor normal. No rashes or lesions  Lymph Nodes: Cervical, supra lifestyle changes and checkups can reduce the risk of disease. Or the goal may be to detect it early to treat it most effectively. Screening tests are not used to diagnose a disease.  But they are used to see if more testing is needed. Health counseling is you choose a test other than a colonoscopy and have an abnormal test result, you will need to follow-up with a colonoscopy. Screening advice varies among expert groups. Talk with your healthcare provider about which tests are best for you.   Some people xiomara months; second dose should be given 1 month after the first dose; the third dose should be given at least 2 months after the second dose and at least 4 months after the first dose   Haemophilus influenzae Type B (HIB) Women at increased risk 1 to 3 doses professional medical care. Always follow your healthcare professional's instructions. The patient indicates understanding of these issues and agrees to the plan.     Problem List:  Patient Active Problem List:     Routine gynecological examinatio

## 2020-12-17 ENCOUNTER — TELEPHONE (OUTPATIENT)
Dept: FAMILY MEDICINE CLINIC | Facility: CLINIC | Age: 45
End: 2020-12-17

## 2020-12-17 DIAGNOSIS — R94.4 ABNORMAL KIDNEY FUNCTION STUDY: Primary | ICD-10-CM

## 2020-12-17 DIAGNOSIS — R73.01 IMPAIRED FASTING GLUCOSE: ICD-10-CM

## 2020-12-17 DIAGNOSIS — R79.89 ABNORMAL THYROID BLOOD TEST: ICD-10-CM

## 2020-12-17 RX ORDER — ICOSAPENT ETHYL 1000 MG/1
2 CAPSULE ORAL 2 TIMES DAILY
Qty: 120 CAPSULE | Refills: 2 | Status: SHIPPED | OUTPATIENT
Start: 2020-12-17 | End: 2021-01-16

## 2020-12-17 NOTE — TELEPHONE ENCOUNTER
Pt calling expressing concerns about recent triglycerides results. Pt asking if results are due to recent covid dx /notes \"never having results like that\" / follows a healthy diet. Pt requesting a follow up appointment to discuss results in detail.   Pooja

## 2020-12-17 NOTE — TELEPHONE ENCOUNTER
----- Message from Uriel Maurer MD sent at 12/17/2020 12:53 PM CST -----  Results reviewed. 1. Hyperlipidemia - TGL uncontrolled. Would recommend start Vascepa if covered by insurance (or can take OTC fish oil). LDL elevated at 134.  Low fat hear

## 2020-12-17 NOTE — TELEPHONE ENCOUNTER
Wellness form was completed and faxed to [address] [place] [address] on 12/17. Success confirmation received. Pt was called to inform about form being faxed. Per Pt copy of form will mailed to home address listed on chart.        Nothing further

## 2020-12-17 NOTE — TELEPHONE ENCOUNTER
Future Appointments   Date Time Provider Stacia Grace   12/21/2020  2:00 PM Griselda Panning, MD SP [facility]   12/30/2020  8:30 AM Ashley Bonner MD EMG 17 [facility]   1/12/2021  1:20 PM [facility]   1/27/2021 10:

## 2020-12-21 ENCOUNTER — TELEPHONE (OUTPATIENT)
Dept: FAMILY MEDICINE CLINIC | Facility: CLINIC | Age: 45
End: 2020-12-21

## 2020-12-30 ENCOUNTER — TELEPHONE (OUTPATIENT)
Dept: FAMILY MEDICINE CLINIC | Facility: CLINIC | Age: 45
End: 2020-12-30

## 2020-12-30 ENCOUNTER — OFFICE VISIT (OUTPATIENT)
Dept: FAMILY MEDICINE CLINIC | Facility: CLINIC | Age: 45
End: 2020-12-30
Payer: COMMERCIAL

## 2020-12-30 VITALS
BODY MASS INDEX: 45.99 KG/M2 | HEIGHT: 67 IN | HEART RATE: 93 BPM | DIASTOLIC BLOOD PRESSURE: 94 MMHG | SYSTOLIC BLOOD PRESSURE: 144 MMHG | RESPIRATION RATE: 18 BRPM | OXYGEN SATURATION: 98 % | TEMPERATURE: 97 F | WEIGHT: 293 LBS

## 2020-12-30 DIAGNOSIS — E78.2 MIXED HYPERLIPIDEMIA: ICD-10-CM

## 2020-12-30 DIAGNOSIS — I10 ESSENTIAL HYPERTENSION: ICD-10-CM

## 2020-12-30 DIAGNOSIS — E03.8 SUBCLINICAL HYPOTHYROIDISM: ICD-10-CM

## 2020-12-30 DIAGNOSIS — R74.01 TRANSAMINITIS: ICD-10-CM

## 2020-12-30 DIAGNOSIS — E11.9 TYPE 2 DIABETES MELLITUS WITHOUT COMPLICATION, WITHOUT LONG-TERM CURRENT USE OF INSULIN (HCC): Primary | ICD-10-CM

## 2020-12-30 PROBLEM — E87.1 HYPONATREMIA: Status: RESOLVED | Noted: 2020-11-09 | Resolved: 2020-12-30

## 2020-12-30 PROBLEM — U07.1 PNEUMONIA DUE TO COVID-19 VIRUS: Status: RESOLVED | Noted: 2020-11-09 | Resolved: 2020-12-30

## 2020-12-30 PROBLEM — J12.82 PNEUMONIA DUE TO COVID-19 VIRUS: Status: RESOLVED | Noted: 2020-11-09 | Resolved: 2020-12-30

## 2020-12-30 PROBLEM — R09.02 HYPOXIA: Status: RESOLVED | Noted: 2020-11-09 | Resolved: 2020-12-30

## 2020-12-30 PROBLEM — G89.29 CHRONIC ELBOW PAIN, RIGHT: Status: RESOLVED | Noted: 2017-08-06 | Resolved: 2020-12-30

## 2020-12-30 PROBLEM — M77.11 LATERAL EPICONDYLITIS OF RIGHT ELBOW: Status: RESOLVED | Noted: 2017-07-27 | Resolved: 2020-12-30

## 2020-12-30 PROBLEM — M77.01 MEDIAL EPICONDYLITIS, RIGHT: Status: RESOLVED | Noted: 2017-07-31 | Resolved: 2020-12-30

## 2020-12-30 PROBLEM — D69.6 THROMBOCYTOPENIA: Status: RESOLVED | Noted: 2020-11-09 | Resolved: 2020-12-30

## 2020-12-30 PROBLEM — M25.521 CHRONIC ELBOW PAIN, RIGHT: Status: RESOLVED | Noted: 2017-08-06 | Resolved: 2020-12-30

## 2020-12-30 PROBLEM — L03.032 PARONYCHIA OF GREAT TOE, LEFT: Status: RESOLVED | Noted: 2019-06-06 | Resolved: 2020-12-30

## 2020-12-30 PROBLEM — D69.6 THROMBOCYTOPENIA (HCC): Status: RESOLVED | Noted: 2020-11-09 | Resolved: 2020-12-30

## 2020-12-30 PROCEDURE — 99214 OFFICE O/P EST MOD 30 MIN: CPT | Performed by: FAMILY MEDICINE

## 2020-12-30 PROCEDURE — 3080F DIAST BP >= 90 MM HG: CPT | Performed by: FAMILY MEDICINE

## 2020-12-30 PROCEDURE — 3008F BODY MASS INDEX DOCD: CPT | Performed by: FAMILY MEDICINE

## 2020-12-30 PROCEDURE — 3077F SYST BP >= 140 MM HG: CPT | Performed by: FAMILY MEDICINE

## 2020-12-30 RX ORDER — BLOOD SUGAR DIAGNOSTIC
STRIP MISCELLANEOUS
Qty: 100 STRIP | Refills: 3 | Status: SHIPPED | OUTPATIENT
Start: 2020-12-30 | End: 2021-12-30

## 2020-12-30 RX ORDER — BLOOD-GLUCOSE METER
1 EACH MISCELLANEOUS 2 TIMES DAILY
Qty: 1 KIT | Refills: 0 | Status: SHIPPED | OUTPATIENT
Start: 2020-12-30 | End: 2021-12-30

## 2020-12-30 RX ORDER — LISINOPRIL 10 MG/1
10 TABLET ORAL DAILY
Qty: 30 TABLET | Refills: 0 | Status: SHIPPED | OUTPATIENT
Start: 2020-12-30 | End: 2021-01-25

## 2020-12-30 RX ORDER — LANCETS
EACH MISCELLANEOUS
Qty: 1 BOX | Refills: 3 | Status: SHIPPED | OUTPATIENT
Start: 2020-12-30

## 2020-12-30 RX ORDER — METFORMIN HYDROCHLORIDE 500 MG/1
500 TABLET, EXTENDED RELEASE ORAL 2 TIMES DAILY WITH MEALS
Qty: 60 TABLET | Refills: 0 | Status: SHIPPED | OUTPATIENT
Start: 2020-12-30 | End: 2021-01-25

## 2020-12-30 RX ORDER — AMOXICILLIN 500 MG
1 CAPSULE ORAL 2 TIMES DAILY
COMMUNITY
End: 2021-07-13

## 2020-12-30 NOTE — TELEPHONE ENCOUNTER
Pt had appt with Dr Jose Manuel Mancilla today, states she was suppose to be prescribed glucometer set and test strips and lancets.

## 2020-12-30 NOTE — PATIENT INSTRUCTIONS
1. Start metformin twice daily with meals.  testing supplies. Measure blood sugar once weekly (fasting); goal .     2. Schedule diabetes educator appointment. 3. Start lisinopril daily. Call back if experiencing any side effects.  Repeat la to.  · Watch your blood sugar as you are told to. Keep a log of your results. This will help your provider change your medicines to keep your blood sugar under control. · Try to reach your ideal weight.  You may be able to cut back on or not have to take d if you need to change your insulin dose. This will depend on your blood sugar results. · Check your blood sugar every 2 to 4 hours, or at least 4 times a day. · Check your ketones often. Watch them more often if you are vomiting and having diarrhea.   · D provider right away if you have any of these symptoms of high blood sugar that don't go away with the above treatment suggestions:   · Urinating often  · Drowsiness  · Thirst  · Headache  · Nausea or vomiting  · Belly (abdominal) pain  · Eyesight changes you succeed:  · Select foods from the 6 food groups below. Your dietitian will help you find food choices within each group.  He or she will also show you serving sizes and how many servings you can have at each meal.  ? Grains, beans, and starchy vegetable www.eatright. org  ? American Diabetes Association 288-918-9313 www. diabetes. org  Roel last reviewed this educational content on 7/1/2019  © 6385-6877 [employer]. [address] [facility], [address] [place] [address]. All rights reserved.  This inform been active. Your provider may recommend that you get moderate to vigorous physical activity for at least 40 minutes each day. You should do this for at least 3 to 4 days each week.  A few examples of moderate to vigorous activity are:  · Walking at a brisk sure to take it exactly as directed. Remember:  · Tell your healthcare provider about all other medicines you take. This includes vitamins and herbs.   · Tell your healthcare provider if you have any side effects after starting to take a medicine. Examples a medicine for cholesterol is the best treatment option for you. Talk with your healthcare provider about your treatment goals.  Make sure you understand why these goals are important, based on your own health history and your family history of heart disea use these pills for a long time. This is even more important if you have anemia or peripheral neuropathy. Sulfonylureas  These pills help your body make more insulin. They are taken 30 minutes before a meal. Possible side effects include low blood sugar. a safety warning for the SGLT-2 inhibitor canagliflozin. Recent studies have shown that this medicine increases the risk of leg and foot amputations.  If you are taking this medicine, tell your healthcare provider right away if you have any new pain or tend instructions.

## 2020-12-30 NOTE — PROGRESS NOTES
[facility] Family Medicine Office Note  Chief Complaint:   Patient presents with:  Lab Results      HPI:   This is a 50year old female coming in for lab follow up.      T2DM   A1c worsened on recent lab work at 6.5%, meeting criteria for type 2 d • Other (CVA) Maternal Grandmother    • Other (leukemia) Father         also had HEP C   • Hypertension Mother    • Other (hyperlipidemia) Mother      Allergies:    Darvocet [Propoxyph*    HIVES    Comment:N 50 TABS  Demerol                 HIVES    Comm BP (!) 144/94   Pulse 93   Temp 97.4 °F (36.3 °C) (Temporal)   Resp 18   Ht 5' 7\" (1.702 m)   Wt (!) 341 lb 8 oz (154.9 kg)   LMP 05/18/2020   SpO2 98%   BMI 53.49 kg/m²  Estimated body mass index is 53.49 kg/m² as calculated from the following:    Vale discuss starting statin. Continue OTC fish oil and ASA. Renal: Urine m/c ordered   Eye: will follow up at next appt. Foot: will follow up at next appt. Immunizations: Will follow up at next appt.    RTC in 1 month for follow-up    - MICROALB/CREAT RA Oral Tablet 24 Hr 60 tablet 0     Sig: Take 1 tablet (500 mg total) by mouth 2 (two) times daily with meals. • lisinopril 10 MG Oral Tab 30 tablet 0     Sig: Take 1 tablet (10 mg total) by mouth daily.        Health Maintenance:  Diabetes Care Dilated Eye

## 2021-01-13 ENCOUNTER — LAB ENCOUNTER (OUTPATIENT)
Dept: LAB | Age: 46
End: 2021-01-13
Attending: FAMILY MEDICINE
Payer: COMMERCIAL

## 2021-01-13 DIAGNOSIS — I10 ESSENTIAL HYPERTENSION: ICD-10-CM

## 2021-01-13 DIAGNOSIS — E11.9 TYPE 2 DIABETES MELLITUS WITHOUT COMPLICATION, WITHOUT LONG-TERM CURRENT USE OF INSULIN (HCC): ICD-10-CM

## 2021-01-13 LAB
ANION GAP SERPL CALC-SCNC: 6 MMOL/L (ref 0–18)
BUN BLD-MCNC: 15 MG/DL (ref 7–18)
BUN/CREAT SERPL: 16.9 (ref 10–20)
CALCIUM BLD-MCNC: 9.6 MG/DL (ref 8.5–10.1)
CHLORIDE SERPL-SCNC: 107 MMOL/L (ref 98–112)
CO2 SERPL-SCNC: 27 MMOL/L (ref 21–32)
CREAT BLD-MCNC: 0.89 MG/DL
CREAT UR-SCNC: 217 MG/DL
GLUCOSE BLD-MCNC: 99 MG/DL (ref 70–99)
MICROALBUMIN UR-MCNC: 1.02 MG/DL
MICROALBUMIN/CREAT 24H UR-RTO: 4.7 UG/MG (ref ?–30)
OSMOLALITY SERPL CALC.SUM OF ELEC: 291 MOSM/KG (ref 275–295)
PATIENT FASTING Y/N/NP: YES
POTASSIUM SERPL-SCNC: 4 MMOL/L (ref 3.5–5.1)
SODIUM SERPL-SCNC: 140 MMOL/L (ref 136–145)

## 2021-01-13 PROCEDURE — 82570 ASSAY OF URINE CREATININE: CPT | Performed by: FAMILY MEDICINE

## 2021-01-13 PROCEDURE — 3061F NEG MICROALBUMINURIA REV: CPT | Performed by: FAMILY MEDICINE

## 2021-01-13 PROCEDURE — 82043 UR ALBUMIN QUANTITATIVE: CPT | Performed by: FAMILY MEDICINE

## 2021-01-13 PROCEDURE — 36415 COLL VENOUS BLD VENIPUNCTURE: CPT | Performed by: FAMILY MEDICINE

## 2021-01-13 PROCEDURE — 80048 BASIC METABOLIC PNL TOTAL CA: CPT | Performed by: FAMILY MEDICINE

## 2021-01-20 ENCOUNTER — TELEPHONE (OUTPATIENT)
Dept: ENDOCRINOLOGY CLINIC | Facility: CLINIC | Age: 46
End: 2021-01-20

## 2021-01-22 ENCOUNTER — HOSPITAL ENCOUNTER (OUTPATIENT)
Dept: MAMMOGRAPHY | Age: 46
Discharge: HOME | End: 2021-01-22
Attending: FAMILY MEDICINE
Payer: COMMERCIAL

## 2021-01-22 DIAGNOSIS — Z12.31 ENCOUNTER FOR SCREENING MAMMOGRAM FOR BREAST CANCER: ICD-10-CM

## 2021-01-22 PROCEDURE — 77067 SCR MAMMO BI INCL CAD: CPT | Performed by: FAMILY MEDICINE

## 2021-01-22 PROCEDURE — 77063 BREAST TOMOSYNTHESIS BI: CPT | Performed by: FAMILY MEDICINE

## 2021-01-25 DIAGNOSIS — E11.9 TYPE 2 DIABETES MELLITUS WITHOUT COMPLICATION, WITHOUT LONG-TERM CURRENT USE OF INSULIN (HCC): ICD-10-CM

## 2021-01-25 DIAGNOSIS — I10 ESSENTIAL HYPERTENSION: ICD-10-CM

## 2021-01-25 RX ORDER — METFORMIN HYDROCHLORIDE 500 MG/1
500 TABLET, EXTENDED RELEASE ORAL 2 TIMES DAILY WITH MEALS
Qty: 60 TABLET | Refills: 0 | Status: SHIPPED | OUTPATIENT
Start: 2021-01-25 | End: 2021-01-27

## 2021-01-25 RX ORDER — LISINOPRIL 10 MG/1
10 TABLET ORAL DAILY
Qty: 30 TABLET | Refills: 0 | Status: SHIPPED | OUTPATIENT
Start: 2021-01-25 | End: 2021-02-09

## 2021-01-25 NOTE — TELEPHONE ENCOUNTER
Medication(s) to Refill:   Requested Prescriptions     Pending Prescriptions Disp Refills   • METFORMIN HCL  MG Oral Tablet 24 Hr [Pharmacy Med Name: Metformin Hydrochloride Er 24hr 500 Mg Tab Gran] 60 tablet 0     Sig: Take 1 tablet (500 mg total) b

## 2021-01-26 ENCOUNTER — TELEMEDICINE (OUTPATIENT)
Dept: ENDOCRINOLOGY CLINIC | Facility: CLINIC | Age: 46
End: 2021-01-26

## 2021-01-26 DIAGNOSIS — E11.9 TYPE 2 DIABETES MELLITUS WITHOUT COMPLICATION, WITHOUT LONG-TERM CURRENT USE OF INSULIN (HCC): Primary | ICD-10-CM

## 2021-01-26 PROCEDURE — G0108 DIAB MANAGE TRN  PER INDIV: HCPCS | Performed by: DIETITIAN, REGISTERED

## 2021-01-26 NOTE — PROGRESS NOTES
Aleida Calle Deangelo Jerry   11/24/1975 was seen for Diabetes Education Initial Visit:    1/26/2021  Referring Provider: Dr. Sandra Farfan  Start time: 9:30 End time: 10:30    Due to COVID-19 ACTION PLAN, the patient's office visit was conducted via real-time in BID.    Discussed macronutrient balance: reduce starch, include lean protein and non-starchy vegetables, also fruit, yogurt and milk using food models.     Gave examples of heart healthy, balanced meals with 30-45 grams of carbohydrate/meal.     Reviewed pr

## 2021-01-27 ENCOUNTER — LAB ENCOUNTER (OUTPATIENT)
Dept: LAB | Age: 46
End: 2021-01-27
Attending: FAMILY MEDICINE
Payer: COMMERCIAL

## 2021-01-27 ENCOUNTER — OFFICE VISIT (OUTPATIENT)
Dept: FAMILY MEDICINE CLINIC | Facility: CLINIC | Age: 46
End: 2021-01-27
Payer: COMMERCIAL

## 2021-01-27 ENCOUNTER — TELEPHONE (OUTPATIENT)
Dept: FAMILY MEDICINE CLINIC | Facility: CLINIC | Age: 46
End: 2021-01-27

## 2021-01-27 VITALS
RESPIRATION RATE: 18 BRPM | OXYGEN SATURATION: 97 % | TEMPERATURE: 97 F | HEART RATE: 93 BPM | BODY MASS INDEX: 45.99 KG/M2 | SYSTOLIC BLOOD PRESSURE: 138 MMHG | WEIGHT: 293 LBS | DIASTOLIC BLOOD PRESSURE: 90 MMHG | HEIGHT: 67 IN

## 2021-01-27 DIAGNOSIS — M79.672 LEFT FOOT PAIN: ICD-10-CM

## 2021-01-27 DIAGNOSIS — E11.9 TYPE 2 DIABETES MELLITUS WITHOUT COMPLICATION, WITHOUT LONG-TERM CURRENT USE OF INSULIN (HCC): Primary | ICD-10-CM

## 2021-01-27 DIAGNOSIS — Z23 NEED FOR VACCINATION: ICD-10-CM

## 2021-01-27 LAB — URATE SERPL-MCNC: 9.1 MG/DL

## 2021-01-27 PROCEDURE — 3008F BODY MASS INDEX DOCD: CPT | Performed by: FAMILY MEDICINE

## 2021-01-27 PROCEDURE — 90732 PPSV23 VACC 2 YRS+ SUBQ/IM: CPT | Performed by: FAMILY MEDICINE

## 2021-01-27 PROCEDURE — 3080F DIAST BP >= 90 MM HG: CPT | Performed by: FAMILY MEDICINE

## 2021-01-27 PROCEDURE — 36415 COLL VENOUS BLD VENIPUNCTURE: CPT | Performed by: FAMILY MEDICINE

## 2021-01-27 PROCEDURE — 90471 IMMUNIZATION ADMIN: CPT | Performed by: FAMILY MEDICINE

## 2021-01-27 PROCEDURE — 99214 OFFICE O/P EST MOD 30 MIN: CPT | Performed by: FAMILY MEDICINE

## 2021-01-27 PROCEDURE — 84550 ASSAY OF BLOOD/URIC ACID: CPT | Performed by: FAMILY MEDICINE

## 2021-01-27 PROCEDURE — 3075F SYST BP GE 130 - 139MM HG: CPT | Performed by: FAMILY MEDICINE

## 2021-01-27 RX ORDER — DULAGLUTIDE 0.75 MG/.5ML
0.75 INJECTION, SOLUTION SUBCUTANEOUS WEEKLY
Qty: 2 ML | Refills: 2 | Status: SHIPPED | OUTPATIENT
Start: 2021-01-27 | End: 2021-04-28

## 2021-01-27 RX ORDER — NAPROXEN 500 MG/1
500 TABLET ORAL 2 TIMES DAILY WITH MEALS
Qty: 28 TABLET | Refills: 0 | Status: SHIPPED | OUTPATIENT
Start: 2021-01-27 | End: 2021-03-31 | Stop reason: ALTCHOICE

## 2021-01-27 NOTE — PATIENT INSTRUCTIONS
1. Start trulicity once weekly. Call back with any questions or concerns. Watch out for signs of low blood sugar (dizziness, sweating, lightheadedness, etc.). Stop Metformin. Continue to follow diabetic diet and regular exercise.      2. Start naproxen twic

## 2021-01-27 NOTE — PROGRESS NOTES
[facility] Family Medicine Office Note  Chief Complaint:   Patient presents with: Follow - Up  Foot Pain: Pt here c/o LT foot pain x 2 days  / pt unable to walk. Pt states swelling /redness on top of foot/ tenderness.        HPI:   This is a 52 y Grandmother    • Other (leukemia) Father         also had HEP C   • Hypertension Mother    • Other (hyperlipidemia) Mother      Allergies:    Darvocet [Propoxyph*    HIVES    Comment:N 50 TABS  Demerol                 HIVES    Comment:TABS  Lortab 96.7 °F (35.9 °C) (Temporal)   Resp 18   Ht 5' 7\" (1.702 m)   Wt (!) 335 lb 8 oz (152.2 kg)   LMP 05/18/2020   SpO2 97%   BMI 52.55 kg/m²  Estimated body mass index is 52.55 kg/m² as calculated from the following:    Height as of this encounter: 5' 7\" (1 Labs: due in 03/21. CV: lifestyle mgmt for now. Renal: urine m/c wnl 01/21. Repeat annually. Eye: wnl 01/21. Follow annually. Foot: wnl today. Repeat annually. Immunizations: Due for PPSV23 otherwise UTD. RTC in 3mo for T2DM follow up.      -

## 2021-01-27 NOTE — TELEPHONE ENCOUNTER
DM eye exam completed on 1/26/2021 at [facility]. Flowsheet filed. Report placed in Dr. Carolann Laurent for review. Will send to scan once reviewed.

## 2021-02-08 DIAGNOSIS — E11.9 TYPE 2 DIABETES MELLITUS WITHOUT COMPLICATION, WITHOUT LONG-TERM CURRENT USE OF INSULIN (HCC): ICD-10-CM

## 2021-02-08 DIAGNOSIS — I10 ESSENTIAL HYPERTENSION: ICD-10-CM

## 2021-02-09 RX ORDER — LISINOPRIL 10 MG/1
10 TABLET ORAL DAILY
Qty: 30 TABLET | Refills: 0 | Status: SHIPPED | OUTPATIENT
Start: 2021-02-09 | End: 2021-03-20

## 2021-02-16 ENCOUNTER — TELEMEDICINE (OUTPATIENT)
Dept: ENDOCRINOLOGY CLINIC | Facility: CLINIC | Age: 46
End: 2021-02-16

## 2021-02-16 DIAGNOSIS — E11.9 TYPE 2 DIABETES MELLITUS WITHOUT COMPLICATION, WITHOUT LONG-TERM CURRENT USE OF INSULIN (HCC): Primary | ICD-10-CM

## 2021-02-16 PROCEDURE — G0108 DIAB MANAGE TRN  PER INDIV: HCPCS | Performed by: DIETITIAN, REGISTERED

## 2021-02-16 NOTE — PROGRESS NOTES
Diabetes Education Follow-up Note    Referring Provider: Dr. Tung Little   Start time: 5:00  End time: 5:30    Due to COVID-19 ACTION PLAN, the patient's office visit was conducted via real-time interactive audio and video.      The patient verbally consents to a

## 2021-03-03 DIAGNOSIS — Z23 NEED FOR VACCINATION: ICD-10-CM

## 2021-03-10 ENCOUNTER — IMMUNIZATION (OUTPATIENT)
Dept: LAB | Age: 46
End: 2021-03-10
Attending: HOSPITALIST
Payer: COMMERCIAL

## 2021-03-10 DIAGNOSIS — Z23 NEED FOR VACCINATION: Primary | ICD-10-CM

## 2021-03-10 PROCEDURE — 0001A SARSCOV2 VAC 30MCG/0.3ML IM: CPT

## 2021-03-20 DIAGNOSIS — I10 ESSENTIAL HYPERTENSION: ICD-10-CM

## 2021-03-20 DIAGNOSIS — E11.9 TYPE 2 DIABETES MELLITUS WITHOUT COMPLICATION, WITHOUT LONG-TERM CURRENT USE OF INSULIN (HCC): ICD-10-CM

## 2021-03-20 RX ORDER — LISINOPRIL 10 MG/1
10 TABLET ORAL DAILY
Qty: 90 TABLET | Refills: 0 | Status: SHIPPED | OUTPATIENT
Start: 2021-03-20 | End: 2021-04-28

## 2021-03-31 ENCOUNTER — OFFICE VISIT (OUTPATIENT)
Dept: INTERNAL MEDICINE CLINIC | Facility: CLINIC | Age: 46
End: 2021-03-31
Payer: COMMERCIAL

## 2021-03-31 ENCOUNTER — IMMUNIZATION (OUTPATIENT)
Dept: LAB | Age: 46
End: 2021-03-31
Attending: HOSPITALIST
Payer: COMMERCIAL

## 2021-03-31 VITALS
RESPIRATION RATE: 16 BRPM | WEIGHT: 293 LBS | BODY MASS INDEX: 45.45 KG/M2 | HEIGHT: 67.5 IN | SYSTOLIC BLOOD PRESSURE: 144 MMHG | OXYGEN SATURATION: 98 % | HEART RATE: 82 BPM | DIASTOLIC BLOOD PRESSURE: 88 MMHG

## 2021-03-31 DIAGNOSIS — Z51.81 THERAPEUTIC DRUG MONITORING: Primary | ICD-10-CM

## 2021-03-31 DIAGNOSIS — E11.9 TYPE 2 DIABETES MELLITUS WITHOUT COMPLICATION, WITHOUT LONG-TERM CURRENT USE OF INSULIN (HCC): ICD-10-CM

## 2021-03-31 DIAGNOSIS — E55.9 VITAMIN D DEFICIENCY: ICD-10-CM

## 2021-03-31 DIAGNOSIS — R79.89 ELEVATED TSH: ICD-10-CM

## 2021-03-31 DIAGNOSIS — Z99.89 OSA ON CPAP: ICD-10-CM

## 2021-03-31 DIAGNOSIS — E66.9 CLASS II OBESITY: ICD-10-CM

## 2021-03-31 DIAGNOSIS — E01.0 THYROMEGALY: ICD-10-CM

## 2021-03-31 DIAGNOSIS — G47.33 OSA ON CPAP: ICD-10-CM

## 2021-03-31 DIAGNOSIS — Z23 NEED FOR VACCINATION: Primary | ICD-10-CM

## 2021-03-31 PROCEDURE — 3008F BODY MASS INDEX DOCD: CPT | Performed by: INTERNAL MEDICINE

## 2021-03-31 PROCEDURE — 3077F SYST BP >= 140 MM HG: CPT | Performed by: INTERNAL MEDICINE

## 2021-03-31 PROCEDURE — 0002A SARSCOV2 VAC 30MCG/0.3ML IM: CPT

## 2021-03-31 PROCEDURE — 3079F DIAST BP 80-89 MM HG: CPT | Performed by: INTERNAL MEDICINE

## 2021-03-31 PROCEDURE — 99204 OFFICE O/P NEW MOD 45 MIN: CPT | Performed by: INTERNAL MEDICINE

## 2021-03-31 RX ORDER — NALTREXONE HYDROCHLORIDE AND BUPROPION HYDROCHLORIDE 8; 90 MG/1; MG/1
2 TABLET, EXTENDED RELEASE ORAL 2 TIMES DAILY
Qty: 120 TABLET | Refills: 0 | Status: SHIPPED | OUTPATIENT
Start: 2021-03-31 | End: 2021-04-01

## 2021-03-31 RX ORDER — ASPIRIN 81 MG/1
81 TABLET ORAL DAILY
COMMUNITY

## 2021-03-31 RX ORDER — DULAGLUTIDE 0.75 MG/.5ML
INJECTION, SOLUTION SUBCUTANEOUS WEEKLY
COMMUNITY
End: 2021-04-28

## 2021-03-31 RX ORDER — NALTREXONE HYDROCHLORIDE AND BUPROPION HYDROCHLORIDE 8; 90 MG/1; MG/1
TABLET, EXTENDED RELEASE ORAL
Qty: 14 TABLET | Refills: 0 | COMMUNITY
Start: 2021-03-31 | End: 2021-04-28

## 2021-03-31 NOTE — PROGRESS NOTES
HISTORY OF PRESENT ILLNESS  Patient presents with:  Weight Problem: 11 years ago lost 30 lbs with diet and exercise      Alok Celaya is a 50year old female new to our office today for initiation of medical weight loss program.  Patient presents Diabetes: yes   Thyroid disease: negative   Constipation: negative  Miscarriage: negative   DVT: negative    Family or personal history of Pancreatic issues / Medullary Thyroid Cancer: negative    History of bariatric surgery: negative     [address] reviewed: ob GFRAA 89 01/13/2021    CA 9.6 01/13/2021    OSMOCALC 291 01/13/2021    ALKPHO 70 12/16/2020    AST 56 (H) 12/16/2020    ALT 77 (H) 12/16/2020    BILT 0.9 12/16/2020    TP 7.4 12/16/2020    ALB 3.7 12/16/2020    GLOBULT 2.8 04/27/2013    GLOBULIN 3.7 12/16/ directed once a week, Disp: 1 Box, Rfl: 3  Albuterol Sulfate  (90 Base) MCG/ACT Inhalation Aero Soln, Inhale 2 puffs into the lungs every 4 (four) hours as needed for Wheezing., Disp: 1 Inhaler, Rfl: 0  Multiple Vitamin (MULTI-VITAMIN DAILY OR), Taylor Rizvi contraindications: n/a   · Trial of contrave  · -advised of side effects and adverse effects of this medication  · Referral for seminar  · Will check bariatric benefits   · Referral to Vikki   · Check labwork   · Continue to maintain regular cpap usage  · you will be able to see if you are eating the right amount of calories, protein, and carbs.  When you set the carolin up chose 1-2 lbs/week as a goal.  2. \"7 minute workout\" to help with exercise/activity which takes 7 minutes of your day and that you can do

## 2021-03-31 NOTE — PATIENT INSTRUCTIONS
Plan:  Continue with medications:   Go to the lab for blood work   Follow up with me in 1 month  Schedule follow up appointments: Nadnini Magaña (dietitian) or Aashish Dewitt (presurgery dietitian)   Check for insurance coverage for dietitian and labwork pr for sodium issues)   -hummus with vegetables  -bean dip with vegetables    FRUIT  Low carb fruit options   Raspberries: Half a cup (60 grams) contains 3 grams of carbs. Blackberries: Half a cup (70 grams) contains 4 grams of carbs.   Strawberries: Half a c

## 2021-04-01 ENCOUNTER — TELEPHONE (OUTPATIENT)
Dept: INTERNAL MEDICINE CLINIC | Facility: CLINIC | Age: 46
End: 2021-04-01

## 2021-04-01 ENCOUNTER — LAB ENCOUNTER (OUTPATIENT)
Dept: LAB | Age: 46
End: 2021-04-01
Attending: INTERNAL MEDICINE
Payer: COMMERCIAL

## 2021-04-01 DIAGNOSIS — E01.0 THYROMEGALY: ICD-10-CM

## 2021-04-01 DIAGNOSIS — Z51.81 THERAPEUTIC DRUG MONITORING: ICD-10-CM

## 2021-04-01 DIAGNOSIS — E66.9 CLASS II OBESITY: ICD-10-CM

## 2021-04-01 DIAGNOSIS — E11.9 TYPE 2 DIABETES MELLITUS WITHOUT COMPLICATION, WITHOUT LONG-TERM CURRENT USE OF INSULIN (HCC): ICD-10-CM

## 2021-04-01 DIAGNOSIS — G47.33 OSA ON CPAP: ICD-10-CM

## 2021-04-01 DIAGNOSIS — E55.9 VITAMIN D DEFICIENCY: ICD-10-CM

## 2021-04-01 DIAGNOSIS — Z99.89 OSA ON CPAP: ICD-10-CM

## 2021-04-01 PROCEDURE — 84439 ASSAY OF FREE THYROXINE: CPT

## 2021-04-01 PROCEDURE — 82746 ASSAY OF FOLIC ACID SERUM: CPT

## 2021-04-01 PROCEDURE — 82607 VITAMIN B-12: CPT

## 2021-04-01 PROCEDURE — 36415 COLL VENOUS BLD VENIPUNCTURE: CPT

## 2021-04-01 PROCEDURE — 82306 VITAMIN D 25 HYDROXY: CPT

## 2021-04-01 PROCEDURE — 84443 ASSAY THYROID STIM HORMONE: CPT

## 2021-04-01 RX ORDER — NALTREXONE HYDROCHLORIDE AND BUPROPION HYDROCHLORIDE 8; 90 MG/1; MG/1
2 TABLET, EXTENDED RELEASE ORAL 2 TIMES DAILY
Qty: 120 TABLET | Refills: 0 | Status: SHIPPED | OUTPATIENT
Start: 2021-04-01 | End: 2021-05-01

## 2021-04-01 NOTE — TELEPHONE ENCOUNTER
Patient is trying to schedule with Antwan Motta as she was referred but this will not clear her for bariatric surgery.   She has to see the dietician at least 2 times and the surgeon once before she will be referred to Dr. Justine Nageotte for psychological

## 2021-04-01 NOTE — TELEPHONE ENCOUNTER
PA requested for contrave on [facility]  I entered information and could not proceed with PA  ? Information regarding your request  CONTRAVE 8 MG-90 MG TABLET ER is not covered for this Member.  For formulary alternatives, please view the Standard or Pre

## 2021-04-07 ENCOUNTER — HOSPITAL ENCOUNTER (OUTPATIENT)
Dept: ULTRASOUND IMAGING | Age: 46
Discharge: HOME | End: 2021-04-07
Attending: INTERNAL MEDICINE
Payer: COMMERCIAL

## 2021-04-07 DIAGNOSIS — E01.0 THYROMEGALY: ICD-10-CM

## 2021-04-07 PROCEDURE — 76536 US EXAM OF HEAD AND NECK: CPT | Performed by: INTERNAL MEDICINE

## 2021-04-14 ENCOUNTER — TELEMEDICINE (OUTPATIENT)
Dept: INTERNAL MEDICINE CLINIC | Facility: CLINIC | Age: 46
End: 2021-04-14

## 2021-04-14 DIAGNOSIS — G47.33 OSA ON CPAP: ICD-10-CM

## 2021-04-14 DIAGNOSIS — Z51.81 THERAPEUTIC DRUG MONITORING: Primary | ICD-10-CM

## 2021-04-14 DIAGNOSIS — E11.9 TYPE 2 DIABETES MELLITUS WITHOUT COMPLICATION, WITHOUT LONG-TERM CURRENT USE OF INSULIN (HCC): ICD-10-CM

## 2021-04-14 DIAGNOSIS — E66.9 CLASS II OBESITY: ICD-10-CM

## 2021-04-14 DIAGNOSIS — Z99.89 OSA ON CPAP: ICD-10-CM

## 2021-04-14 PROCEDURE — 99214 OFFICE O/P EST MOD 30 MIN: CPT | Performed by: INTERNAL MEDICINE

## 2021-04-14 NOTE — PROGRESS NOTES
HISTORY OF PRESENT ILLNESS  Patient presents with:  Weight Check: video      Starlett Catracho is a 50year old female here for follow up in medical weight loss program.     Denies chest pain, shortness of breath, dizziness, blurred vision, headache, Results   Component Value Date    WBC 4.3 12/16/2020    RBC 4.80 12/16/2020    HGB 13.1 12/16/2020    HCT 40.9 12/16/2020    MCV 85.2 12/16/2020    MCH 27.3 12/16/2020    MCHC 32.0 12/16/2020    RDW 14.6 12/16/2020    .0 12/16/2020     Lab Results week. With food for 12 weeks total then begin OTC Vitamin D 2000 units daily therafter, Disp: 12 capsule, Rfl: 0  Naltrexone-buPROPion HCl ER (CONTRAVE) 8-90 MG Oral Tablet 12 Hr, Take 2 tablets by mouth 2 (two) times daily. , Disp: 120 tablet, Rfl: 0  aspi effects and adverse effects of this medication  · Reviewed pulmonary rec'd and possible need for pulm clearance  · Scheduled with Vikki and seminar [facility] for May 10  · Follow up with dietitian and psychologist as recommended.   · Discussed the role of sleep

## 2021-04-22 ENCOUNTER — TELEPHONE (OUTPATIENT)
Dept: INTERNAL MEDICINE CLINIC | Facility: CLINIC | Age: 46
End: 2021-04-22

## 2021-04-22 NOTE — TELEPHONE ENCOUNTER
Called patient and reviewed all insurance information and Bariatric Program. Patient does have benefits and must meet 3 of the following 4 criteria: 100 plus lbs over her medically ideal weight, BMI of 45 or higher, Have co-morbidities, Physician documenta

## 2021-04-23 ENCOUNTER — PATIENT MESSAGE (OUTPATIENT)
Dept: INTERNAL MEDICINE CLINIC | Facility: CLINIC | Age: 46
End: 2021-04-23

## 2021-04-23 NOTE — TELEPHONE ENCOUNTER
I called patient and discussed her symptoms. She has not had bowel movement for 3 days. She feels sick - nauseated, hot flashes, trouble sleeping. I spoke with Dr. Amina Pantoja and she would like patient to decrease her contrave to 1 tablet twice daily.   Ramiro

## 2021-04-23 NOTE — TELEPHONE ENCOUNTER
From: Yoli Granados  To: Mary Jo Silverio MD  Sent: 4/23/2021 2:46 AM CDT  Subject: Non-Urgent Medical Question    Good morning,   I have started to feel very sick. I feel like I have to throw up. My stomach is very unsettled.  I have not thrown up thoug

## 2021-04-27 RX ORDER — NALTREXONE HYDROCHLORIDE AND BUPROPION HYDROCHLORIDE 8; 90 MG/1; MG/1
TABLET, EXTENDED RELEASE ORAL
Qty: 120 TABLET | Refills: 0 | OUTPATIENT
Start: 2021-04-27

## 2021-04-27 NOTE — TELEPHONE ENCOUNTER
Requesting Contrave 8/90  LOV: 4/14/21  RTC: one month  Last Relevant Labs: na  Filled: 4/1/21 #30 with 0 refills    4/30/2021 11:00 AM Pebbles Bliss [facility]   5/27/2021  1:00 PM Severiano Sing, MD [facility]   6/29/2021  8:00

## 2021-04-28 ENCOUNTER — OFFICE VISIT (OUTPATIENT)
Dept: FAMILY MEDICINE CLINIC | Facility: CLINIC | Age: 46
End: 2021-04-28
Payer: COMMERCIAL

## 2021-04-28 VITALS
TEMPERATURE: 97 F | HEART RATE: 84 BPM | DIASTOLIC BLOOD PRESSURE: 80 MMHG | SYSTOLIC BLOOD PRESSURE: 134 MMHG | RESPIRATION RATE: 18 BRPM | OXYGEN SATURATION: 97 % | WEIGHT: 293 LBS | HEIGHT: 67.5 IN | BODY MASS INDEX: 45.45 KG/M2

## 2021-04-28 DIAGNOSIS — I10 ESSENTIAL HYPERTENSION: ICD-10-CM

## 2021-04-28 DIAGNOSIS — E11.9 TYPE 2 DIABETES MELLITUS WITHOUT COMPLICATION, WITHOUT LONG-TERM CURRENT USE OF INSULIN (HCC): Primary | ICD-10-CM

## 2021-04-28 DIAGNOSIS — E66.01 CLASS 3 SEVERE OBESITY DUE TO EXCESS CALORIES WITHOUT SERIOUS COMORBIDITY WITH BODY MASS INDEX (BMI) OF 50.0 TO 59.9 IN ADULT (HCC): ICD-10-CM

## 2021-04-28 PROCEDURE — 3079F DIAST BP 80-89 MM HG: CPT | Performed by: FAMILY MEDICINE

## 2021-04-28 PROCEDURE — 3075F SYST BP GE 130 - 139MM HG: CPT | Performed by: FAMILY MEDICINE

## 2021-04-28 PROCEDURE — 3008F BODY MASS INDEX DOCD: CPT | Performed by: FAMILY MEDICINE

## 2021-04-28 PROCEDURE — 99214 OFFICE O/P EST MOD 30 MIN: CPT | Performed by: FAMILY MEDICINE

## 2021-04-28 RX ORDER — NALTREXONE HYDROCHLORIDE AND BUPROPION HYDROCHLORIDE 8; 90 MG/1; MG/1
2 TABLET, EXTENDED RELEASE ORAL 2 TIMES DAILY
Qty: 120 TABLET | Refills: 0 | Status: CANCELLED | OUTPATIENT
Start: 2021-04-28 | End: 2021-05-28

## 2021-04-28 RX ORDER — DULAGLUTIDE 0.75 MG/.5ML
INJECTION, SOLUTION SUBCUTANEOUS WEEKLY
Refills: 0 | Status: CANCELLED | OUTPATIENT
Start: 2021-04-28

## 2021-04-28 RX ORDER — DULAGLUTIDE 0.75 MG/.5ML
0.75 INJECTION, SOLUTION SUBCUTANEOUS WEEKLY
Qty: 2 ML | Refills: 2 | Status: SHIPPED | OUTPATIENT
Start: 2021-04-28 | End: 2021-05-28

## 2021-04-28 RX ORDER — LISINOPRIL 10 MG/1
10 TABLET ORAL DAILY
Qty: 90 TABLET | Refills: 3 | Status: SHIPPED | OUTPATIENT
Start: 2021-04-28 | End: 2021-12-01 | Stop reason: ALTCHOICE

## 2021-04-28 NOTE — PROGRESS NOTES
[facility] Family Medicine Office Note  Chief Complaint:   Patient presents with: Follow - Up  Diabetes      HPI:   This is a 50year old female coming in for    Obesity   -Has been on contrave  -Has been working on dietary/exercise changes.  [facility] TABS  Demerol                 HIVES    Comment:TABS  Lortab                  HIVES    Comment:ASA TABS  Morphine                HIVES    Comment:Derivatives  Metformin               DIARRHEA  Current Meds:  Current Outpatient Medications   Medication Sig D All other systems reviewed and are negative.        EXAM:   /80   Pulse 84   Temp 97.2 °F (36.2 °C) (Temporal)   Resp 18   Ht 5' 7.5\" (1.715 m)   Wt (!) 326 lb (147.9 kg)   LMP 05/18/2020   SpO2 97%   BMI 50.31 kg/m²  Estimated body mass index is 5 mouth daily. Dispense: 90 tablet; Refill: 3  - Dulaglutide (TRULICITY) 3.82 WJ/1.9KZ Subcutaneous Solution Pen-injector; Inject 0.75 mg into the skin once a week. Dispense: 2 mL; Refill: 2    2.  Essential hypertension  Stable, CPM  - lisinopril 10 MG Ora

## 2021-04-29 ENCOUNTER — LAB ENCOUNTER (OUTPATIENT)
Dept: LAB | Age: 46
End: 2021-04-29
Attending: FAMILY MEDICINE
Payer: COMMERCIAL

## 2021-04-29 DIAGNOSIS — R73.01 IMPAIRED FASTING GLUCOSE: ICD-10-CM

## 2021-04-29 DIAGNOSIS — R79.89 ABNORMAL THYROID BLOOD TEST: ICD-10-CM

## 2021-04-29 DIAGNOSIS — R94.4 ABNORMAL KIDNEY FUNCTION STUDY: ICD-10-CM

## 2021-04-29 PROCEDURE — 3044F HG A1C LEVEL LT 7.0%: CPT | Performed by: FAMILY MEDICINE

## 2021-04-29 PROCEDURE — 83036 HEMOGLOBIN GLYCOSYLATED A1C: CPT | Performed by: FAMILY MEDICINE

## 2021-04-29 PROCEDURE — 84443 ASSAY THYROID STIM HORMONE: CPT | Performed by: FAMILY MEDICINE

## 2021-04-29 PROCEDURE — 80061 LIPID PANEL: CPT | Performed by: FAMILY MEDICINE

## 2021-04-29 PROCEDURE — 80053 COMPREHEN METABOLIC PANEL: CPT | Performed by: FAMILY MEDICINE

## 2021-04-30 ENCOUNTER — TELEMEDICINE (OUTPATIENT)
Dept: INTERNAL MEDICINE CLINIC | Facility: CLINIC | Age: 46
End: 2021-04-30

## 2021-04-30 DIAGNOSIS — E78.2 MIXED HYPERLIPIDEMIA: ICD-10-CM

## 2021-04-30 DIAGNOSIS — E66.9 CLASS II OBESITY: ICD-10-CM

## 2021-04-30 DIAGNOSIS — E11.9 TYPE 2 DIABETES MELLITUS WITHOUT COMPLICATION, WITHOUT LONG-TERM CURRENT USE OF INSULIN (HCC): ICD-10-CM

## 2021-04-30 DIAGNOSIS — I10 ESSENTIAL HYPERTENSION: ICD-10-CM

## 2021-04-30 DIAGNOSIS — Z51.81 THERAPEUTIC DRUG MONITORING: Primary | ICD-10-CM

## 2021-04-30 PROCEDURE — 99213 OFFICE O/P EST LOW 20 MIN: CPT | Performed by: PHYSICIAN ASSISTANT

## 2021-04-30 NOTE — PROGRESS NOTES
This visit is conducted using Telemedicine with live, interactive video and audio. Patient has been referred to the [facility] website at [email or web address] to review the yearly Consent to Treat document.     Patient understands and accepts financial res no increased effort or work with breathing   NEURO: speaking fluently and in clear sentences    Lab Results   Component Value Date    WBC 4.3 12/16/2020    RBC 4.80 12/16/2020    HGB 13.1 12/16/2020    HCT 40.9 12/16/2020    MCV 85.2 12/16/2020    MCH 27. 3 2  ergocalciferol 1.25 MG (92048 UT) Oral Cap, Take 1 capsule (50,000 Units total) by mouth once a week.  With food for 12 weeks total then begin OTC Vitamin D 2000 units daily therafter, Disp: 12 capsule, Rfl: 0  aspirin 81 MG Oral Tab EC, Take 81 mg by mo sleep and stress in weight management. · Counseled on comprehensive weight loss plan including attention to nutrition, exercise and behavior/stress management for success. See patient instruction below for more details.   · Discussed strategies to overcome

## 2021-05-01 RX ORDER — NALTREXONE HYDROCHLORIDE AND BUPROPION HYDROCHLORIDE 8; 90 MG/1; MG/1
2 TABLET, EXTENDED RELEASE ORAL 2 TIMES DAILY
Qty: 120 TABLET | Refills: 0 | Status: SHIPPED | OUTPATIENT
Start: 2021-05-01 | End: 2021-05-31

## 2021-05-27 ENCOUNTER — TELEMEDICINE (OUTPATIENT)
Dept: INTERNAL MEDICINE CLINIC | Facility: CLINIC | Age: 46
End: 2021-05-27

## 2021-05-27 DIAGNOSIS — K59.00 CONSTIPATION, UNSPECIFIED CONSTIPATION TYPE: ICD-10-CM

## 2021-05-27 DIAGNOSIS — E11.9 TYPE 2 DIABETES MELLITUS WITHOUT COMPLICATION, WITHOUT LONG-TERM CURRENT USE OF INSULIN (HCC): ICD-10-CM

## 2021-05-27 DIAGNOSIS — Z51.81 THERAPEUTIC DRUG MONITORING: Primary | ICD-10-CM

## 2021-05-27 DIAGNOSIS — E66.9 CLASS II OBESITY: ICD-10-CM

## 2021-05-27 PROCEDURE — 99214 OFFICE O/P EST MOD 30 MIN: CPT | Performed by: INTERNAL MEDICINE

## 2021-05-27 RX ORDER — SEMAGLUTIDE 1.34 MG/ML
0.5 INJECTION, SOLUTION SUBCUTANEOUS WEEKLY
Qty: 1.5 ML | Refills: 0 | Status: SHIPPED | OUTPATIENT
Start: 2021-05-27 | End: 2021-06-29

## 2021-05-27 NOTE — PATIENT INSTRUCTIONS
[email or web address]. pdf  DIRECTIONS FOR USE Plenity should be taken with water twice a day, 20-30 minutes before lunch and 20-30 minutes before dinner.  Each dose includes 3 capsules of Plenity provided in a single blister pack

## 2021-05-27 NOTE — PROGRESS NOTES
This visit is conducted using Telemedicine with live, interactive video and audio. Patient has been referred to the [facility] website at [email or web address] to review the yearly Consent to Treat document.     Patient understands and accepts financial res MCHC 32.0 12/16/2020    RDW 14.6 12/16/2020    .0 12/16/2020     Lab Results   Component Value Date    GLU 98 04/29/2021    BUN 14 04/29/2021    BUNCREA 13.9 04/29/2021    CREATSERUM 1.01 04/29/2021    ANIONGAP 5 04/29/2021    GFR 78 01/03/2018 Cap, Take 1 capsule (50,000 Units total) by mouth once a week. With food for 12 weeks total then begin OTC Vitamin D 2000 units daily therafter, Disp: 12 capsule, Rfl: 0  aspirin 81 MG Oral Tab EC, Take 81 mg by mouth daily. , Disp: , Rfl:   Omega-3 Fatty A contraindications: n/a  · Follow up with dietitian and psychologist as recommended. · Discussed the role of sleep and stress in weight management.   · Counseled on comprehensive weight loss plan including attention to nutrition, exercise and behavior/stres

## 2021-06-23 RX ORDER — ERGOCALCIFEROL 1.25 MG/1
50000 CAPSULE ORAL WEEKLY
Qty: 12 CAPSULE | Refills: 0 | OUTPATIENT
Start: 2021-06-23

## 2021-06-23 NOTE — TELEPHONE ENCOUNTER
Requesting vitamin D  LOV: 5/27/21  RTC: one month  Last Relevant Labs: 4/1/21  Filled: 4/4/21 #12 with 0 refills    Future Appointments   Date Time Provider Stacia Edwards   6/29/2021  8:00 AM Sherri Bhatti MD [facility]     To be on OTC now.   R

## 2021-06-29 ENCOUNTER — OFFICE VISIT (OUTPATIENT)
Dept: INTERNAL MEDICINE CLINIC | Facility: CLINIC | Age: 46
End: 2021-06-29
Payer: COMMERCIAL

## 2021-06-29 VITALS
DIASTOLIC BLOOD PRESSURE: 80 MMHG | RESPIRATION RATE: 16 BRPM | HEIGHT: 67.5 IN | BODY MASS INDEX: 45.45 KG/M2 | HEART RATE: 94 BPM | WEIGHT: 293 LBS | SYSTOLIC BLOOD PRESSURE: 126 MMHG

## 2021-06-29 DIAGNOSIS — E11.9 TYPE 2 DIABETES MELLITUS WITHOUT COMPLICATION, WITHOUT LONG-TERM CURRENT USE OF INSULIN (HCC): ICD-10-CM

## 2021-06-29 DIAGNOSIS — E66.9 CLASS II OBESITY: ICD-10-CM

## 2021-06-29 DIAGNOSIS — Z51.81 THERAPEUTIC DRUG MONITORING: Primary | ICD-10-CM

## 2021-06-29 PROCEDURE — 99214 OFFICE O/P EST MOD 30 MIN: CPT | Performed by: INTERNAL MEDICINE

## 2021-06-29 PROCEDURE — 3008F BODY MASS INDEX DOCD: CPT | Performed by: INTERNAL MEDICINE

## 2021-06-29 PROCEDURE — 3074F SYST BP LT 130 MM HG: CPT | Performed by: INTERNAL MEDICINE

## 2021-06-29 PROCEDURE — 3079F DIAST BP 80-89 MM HG: CPT | Performed by: INTERNAL MEDICINE

## 2021-06-29 NOTE — PROGRESS NOTES
HISTORY OF PRESENT ILLNESS  Patient presents with:  Weight Check: up 5 lbs       Darryl Sexton is a 50year old female here for follow up in medical weight loss program.     Denies chest pain, shortness of breath, dizziness, blurred vision, headac 98 04/29/2021    BUN 14 04/29/2021    BUNCREA 13.9 04/29/2021    CREATSERUM 1.01 04/29/2021    ANIONGAP 5 04/29/2021    GFR 78 01/03/2018    GFRNAA 66 04/29/2021    GFRAA 77 04/29/2021    CA 9.2 04/29/2021    OSMOCALC 286 04/29/2021    ALKPHO 75 04/29/2021 TARA PLUS) w/Device Does not apply Kit, 1 Device by Other route 2 (two) times daily. , Disp: 1 kit, Rfl: 0  Glucose Blood (ACCU-CHEK TARA PLUS) In Vitro Strip, Use as directed 1 strip once a week, Disp: 100 strip, Rfl: 3  Accu-Chek Softclix Lancets Does n instruction below for more details.   · Discussed strategies to overcome barriers to successful weight loss and weight maintenance  · FITTE: ACSM recommendations (150-300 minutes/ week in active weight loss)   · Weight Loss consent to treat reviewed and sig

## 2021-07-07 ENCOUNTER — OFFICE VISIT (OUTPATIENT)
Dept: FAMILY MEDICINE CLINIC | Facility: CLINIC | Age: 46
End: 2021-07-07
Payer: COMMERCIAL

## 2021-07-07 ENCOUNTER — HOSPITAL ENCOUNTER (EMERGENCY)
Age: 46
Discharge: HOME | End: 2021-07-07
Attending: EMERGENCY MEDICINE
Payer: COMMERCIAL

## 2021-07-07 ENCOUNTER — APPOINTMENT (OUTPATIENT)
Dept: ULTRASOUND IMAGING | Age: 46
End: 2021-07-07
Attending: EMERGENCY MEDICINE
Payer: COMMERCIAL

## 2021-07-07 ENCOUNTER — APPOINTMENT (OUTPATIENT)
Dept: ULTRASOUND IMAGING | Facility: HOSPITAL | Age: 46
End: 2021-07-07
Attending: FAMILY MEDICINE
Payer: COMMERCIAL

## 2021-07-07 ENCOUNTER — HOSPITAL ENCOUNTER (OUTPATIENT)
Dept: GENERAL RADIOLOGY | Age: 46
Discharge: HOME | End: 2021-07-07
Attending: FAMILY MEDICINE
Payer: COMMERCIAL

## 2021-07-07 ENCOUNTER — TELEPHONE (OUTPATIENT)
Dept: FAMILY MEDICINE CLINIC | Facility: CLINIC | Age: 46
End: 2021-07-07

## 2021-07-07 VITALS
TEMPERATURE: 98 F | SYSTOLIC BLOOD PRESSURE: 111 MMHG | OXYGEN SATURATION: 99 % | HEART RATE: 82 BPM | RESPIRATION RATE: 16 BRPM | DIASTOLIC BLOOD PRESSURE: 61 MMHG

## 2021-07-07 VITALS
OXYGEN SATURATION: 98 % | BODY MASS INDEX: 45.45 KG/M2 | TEMPERATURE: 97 F | HEART RATE: 90 BPM | WEIGHT: 293 LBS | HEIGHT: 67.5 IN | RESPIRATION RATE: 18 BRPM | SYSTOLIC BLOOD PRESSURE: 152 MMHG | DIASTOLIC BLOOD PRESSURE: 88 MMHG

## 2021-07-07 DIAGNOSIS — M10.072 ACUTE IDIOPATHIC GOUT INVOLVING TOE OF LEFT FOOT: ICD-10-CM

## 2021-07-07 DIAGNOSIS — M25.562 ARTHRALGIA OF LEFT LOWER LEG: Primary | ICD-10-CM

## 2021-07-07 DIAGNOSIS — M10.072 ACUTE IDIOPATHIC GOUT INVOLVING TOE OF LEFT FOOT: Primary | ICD-10-CM

## 2021-07-07 DIAGNOSIS — R22.42 LOCALIZED SWELLING OF LEFT FOOT: ICD-10-CM

## 2021-07-07 LAB
ANION GAP SERPL CALC-SCNC: 5 MMOL/L (ref 0–18)
BASOPHILS # BLD AUTO: 0.06 X10(3) UL (ref 0–0.2)
BASOPHILS NFR BLD AUTO: 0.9 %
BUN BLD-MCNC: 16 MG/DL (ref 7–18)
BUN/CREAT SERPL: 16.7 (ref 10–20)
CALCIUM BLD-MCNC: 8.8 MG/DL (ref 8.5–10.1)
CHLORIDE SERPL-SCNC: 105 MMOL/L (ref 98–112)
CO2 SERPL-SCNC: 30 MMOL/L (ref 21–32)
CREAT BLD-MCNC: 0.96 MG/DL
DEPRECATED RDW RBC AUTO: 42.6 FL (ref 35.1–46.3)
EOSINOPHIL # BLD AUTO: 0.16 X10(3) UL (ref 0–0.7)
EOSINOPHIL NFR BLD AUTO: 2.3 %
ERYTHROCYTE [DISTWIDTH] IN BLOOD BY AUTOMATED COUNT: 13.7 % (ref 11–15)
GLUCOSE BLD-MCNC: 148 MG/DL (ref 70–99)
HCT VFR BLD AUTO: 39 %
HGB BLD-MCNC: 12.7 G/DL
IMM GRANULOCYTES # BLD AUTO: 0.05 X10(3) UL (ref 0–1)
IMM GRANULOCYTES NFR BLD: 0.7 %
LYMPHOCYTES # BLD AUTO: 2.44 X10(3) UL (ref 1–4)
LYMPHOCYTES NFR BLD AUTO: 35.4 %
MCH RBC QN AUTO: 27.7 PG (ref 26–34)
MCHC RBC AUTO-ENTMCNC: 32.6 G/DL (ref 31–37)
MCV RBC AUTO: 85.2 FL
MONOCYTES # BLD AUTO: 0.43 X10(3) UL (ref 0.1–1)
MONOCYTES NFR BLD AUTO: 6.2 %
NEUTROPHILS # BLD AUTO: 3.76 X10 (3) UL (ref 1.5–7.7)
NEUTROPHILS # BLD AUTO: 3.76 X10(3) UL (ref 1.5–7.7)
NEUTROPHILS NFR BLD AUTO: 54.5 %
OSMOLALITY SERPL CALC.SUM OF ELEC: 294 MOSM/KG (ref 275–295)
PLATELET # BLD AUTO: 188 10(3)UL (ref 150–450)
POTASSIUM SERPL-SCNC: 3.7 MMOL/L (ref 3.5–5.1)
RBC # BLD AUTO: 4.58 X10(6)UL
SODIUM SERPL-SCNC: 140 MMOL/L (ref 136–145)
WBC # BLD AUTO: 6.9 X10(3) UL (ref 4–11)

## 2021-07-07 PROCEDURE — 3077F SYST BP >= 140 MM HG: CPT | Performed by: FAMILY MEDICINE

## 2021-07-07 PROCEDURE — 99214 OFFICE O/P EST MOD 30 MIN: CPT | Performed by: FAMILY MEDICINE

## 2021-07-07 PROCEDURE — 3079F DIAST BP 80-89 MM HG: CPT | Performed by: FAMILY MEDICINE

## 2021-07-07 PROCEDURE — 80048 BASIC METABOLIC PNL TOTAL CA: CPT | Performed by: EMERGENCY MEDICINE

## 2021-07-07 PROCEDURE — 3008F BODY MASS INDEX DOCD: CPT | Performed by: FAMILY MEDICINE

## 2021-07-07 PROCEDURE — 85025 COMPLETE CBC W/AUTO DIFF WBC: CPT | Performed by: EMERGENCY MEDICINE

## 2021-07-07 PROCEDURE — 99284 EMERGENCY DEPT VISIT MOD MDM: CPT

## 2021-07-07 PROCEDURE — 93971 EXTREMITY STUDY: CPT | Performed by: EMERGENCY MEDICINE

## 2021-07-07 PROCEDURE — 84550 ASSAY OF BLOOD/URIC ACID: CPT | Performed by: EMERGENCY MEDICINE

## 2021-07-07 PROCEDURE — 73630 X-RAY EXAM OF FOOT: CPT | Performed by: FAMILY MEDICINE

## 2021-07-07 PROCEDURE — 96374 THER/PROPH/DIAG INJ IV PUSH: CPT

## 2021-07-07 PROCEDURE — 96375 TX/PRO/DX INJ NEW DRUG ADDON: CPT

## 2021-07-07 RX ORDER — ERGOCALCIFEROL 1.25 MG/1
50000 CAPSULE ORAL WEEKLY
Qty: 12 CAPSULE | Refills: 0 | OUTPATIENT
Start: 2021-07-07

## 2021-07-07 RX ORDER — METHYLPREDNISOLONE SODIUM SUCCINATE 125 MG/2ML
125 INJECTION, POWDER, LYOPHILIZED, FOR SOLUTION INTRAMUSCULAR; INTRAVENOUS ONCE
Status: COMPLETED | OUTPATIENT
Start: 2021-07-07 | End: 2021-07-07

## 2021-07-07 RX ORDER — TRAMADOL HYDROCHLORIDE 50 MG/1
TABLET ORAL EVERY 6 HOURS PRN
Qty: 10 TABLET | Refills: 0 | Status: SHIPPED | OUTPATIENT
Start: 2021-07-07 | End: 2021-07-14

## 2021-07-07 RX ORDER — KETOROLAC TROMETHAMINE 30 MG/ML
30 INJECTION, SOLUTION INTRAMUSCULAR; INTRAVENOUS ONCE
Status: COMPLETED | OUTPATIENT
Start: 2021-07-07 | End: 2021-07-07

## 2021-07-07 RX ORDER — PREDNISONE 10 MG/1
TABLET ORAL
Qty: 30 TABLET | Refills: 0 | Status: SHIPPED | OUTPATIENT
Start: 2021-07-07 | End: 2021-07-17

## 2021-07-07 RX ORDER — METHYLPREDNISOLONE 4 MG/1
TABLET ORAL
Qty: 21 TABLET | Refills: 0 | Status: SHIPPED | OUTPATIENT
Start: 2021-07-07 | End: 2021-07-14

## 2021-07-07 NOTE — TELEPHONE ENCOUNTER
----- Message from Stephanie Sexton MD sent at 7/7/2021 10:19 AM CDT -----  No acute fracture seen.  There is extensive swelling from more than I thought on my physical exam. Given recent travel, will recommend getting stat ultrasound to r/o DVT

## 2021-07-07 NOTE — TELEPHONE ENCOUNTER
Requesting vitamin D  LOV: 6/29/21  RTC: one month  Last Relevant Labs: 4/1/21  Filled: 4/4/21 #12 with 0 refills    8/31/2021 11:40 AM MD BRETT Anton [facility]     Denied refill as therapy was for 3 months total - to be on OTC daily now.

## 2021-07-07 NOTE — PROGRESS NOTES
[facility] Family Medicine Office Note  Chief Complaint:   Patient presents with:  Swelling: c/o LT foot 1st digit swelling and pain x since Monday morning.  Pt notes recently travel via plane to [facility] if swelling is due to sitting down dur Vaping Use: Never used    Alcohol use:  Yes      Alcohol/week: 0.0 standard drinks      Comment: 1 month    Drug use: No    Family History:  Family History   Problem Relation Age of Onset   • Other (CVA) Paternal Grandmother    • Other (CVA) Paternal Thompson Germaine Glucose Blood (ACCU-CHEK TARA PLUS) In Vitro Strip Use as directed 1 strip once a week 100 strip 3   • Accu-Chek Softclix Lancets Does not apply Misc Use as directed once a week 1 Box 3   • Albuterol Sulfate  (90 Base) MCG/ACT Inhalation Aero Soln Reviewed return/call back precautions. Follow up in 1 week PRN. - predniSONE 10 MG Oral Tab;  Take 4 tablets (40 mg total) by mouth daily for 4 days, THEN 3 tablets (30 mg total) daily for 3 days, THEN 2 tablets (20 mg total) daily for 2 days, THEN 1 ta

## 2021-07-07 NOTE — PATIENT INSTRUCTIONS
Gout Diet  Gout is a painful condition caused by an excess of uric acid, a waste product made by the body. Uric acid forms crystals that collect in the joints. The immune response to these crystals brings on symptoms of joint pain and swelling.  This is c Complex carbohydrate foods, including whole grains, brown rice, oats, and beans  · Coffee, in moderation  · Water, approximately 64 ounces per day  Follow-up care  Follow up with your healthcare provider as advised.   When to seek medical advice  Call your probencid. Other medicines can help relieve pain and swelling during an acute attack. Medicines such as NSAIDs (nonsteroidal anti-inflammatory medicines), steroids, and colchicine may be prescribed for intermittent use to relieve an acute gout attack.  Be s Your healthcare provider can work with you to determine the best eating plan for you. You can learn which foods affect your gout more than others. Reactions to different foods can vary from person to person.  Know that diet is only one part of managing gout Choose lean sources, such as soy, lean meats, and poultry without the skin. · Low in fat. No more than 30% of calories should come from fat, with only 10% coming from animal (saturated) fat.   Roel last reviewed this educational content on 8/1/2020  ©

## 2021-07-08 LAB — URATE SERPL-MCNC: 6.9 MG/DL

## 2021-07-14 ENCOUNTER — OFFICE VISIT (OUTPATIENT)
Dept: FAMILY MEDICINE CLINIC | Facility: CLINIC | Age: 46
End: 2021-07-14
Payer: COMMERCIAL

## 2021-07-14 VITALS
OXYGEN SATURATION: 98 % | RESPIRATION RATE: 18 BRPM | HEIGHT: 67.5 IN | SYSTOLIC BLOOD PRESSURE: 152 MMHG | BODY MASS INDEX: 45.45 KG/M2 | TEMPERATURE: 97 F | WEIGHT: 293 LBS | HEART RATE: 109 BPM | DIASTOLIC BLOOD PRESSURE: 78 MMHG

## 2021-07-14 DIAGNOSIS — M25.572 ARTHRALGIA OF LEFT FOOT: ICD-10-CM

## 2021-07-14 DIAGNOSIS — M10.072 ACUTE IDIOPATHIC GOUT INVOLVING TOE OF LEFT FOOT: Primary | ICD-10-CM

## 2021-07-14 PROCEDURE — 99214 OFFICE O/P EST MOD 30 MIN: CPT | Performed by: FAMILY MEDICINE

## 2021-07-14 PROCEDURE — 3077F SYST BP >= 140 MM HG: CPT | Performed by: FAMILY MEDICINE

## 2021-07-14 PROCEDURE — 3008F BODY MASS INDEX DOCD: CPT | Performed by: FAMILY MEDICINE

## 2021-07-14 PROCEDURE — 3078F DIAST BP <80 MM HG: CPT | Performed by: FAMILY MEDICINE

## 2021-07-14 RX ORDER — ONDANSETRON 4 MG/1
4 TABLET, ORALLY DISINTEGRATING ORAL EVERY 8 HOURS PRN
Qty: 12 TABLET | Refills: 0 | Status: SHIPPED | OUTPATIENT
Start: 2021-07-14

## 2021-07-14 RX ORDER — ALLOPURINOL 100 MG/1
100 TABLET ORAL DAILY
Qty: 30 TABLET | Refills: 2 | Status: SHIPPED | OUTPATIENT
Start: 2021-07-14 | End: 2021-08-04

## 2021-07-14 RX ORDER — HYDROCODONE BITARTRATE AND ACETAMINOPHEN 5; 325 MG/1; MG/1
1 TABLET ORAL EVERY 6 HOURS PRN
Qty: 12 TABLET | Refills: 0 | Status: SHIPPED | OUTPATIENT
Start: 2021-07-14

## 2021-07-14 NOTE — PROGRESS NOTES
[facility] Family Medicine Office Note  Chief Complaint:   Patient presents with: Follow - Up: follow up LT foot pain       HPI:   This is a 50year old female coming in for L foot pain/swelling follow up.  Seen on 07/07 for possibly initially go 50 TABS  Demerol                 HIVES    Comment:TABS  Lortab                  HIVES    Comment:ASA TABS  Morphine                HIVES    Comment:Derivatives  Metformin               DIARRHEA  Current Meds:  Current Outpatient Medications   Medication Si Softclix Lancets Does not apply Misc Use as directed once a week 1 Box 3   • Albuterol Sulfate  (90 Base) MCG/ACT Inhalation Aero Soln Inhale 2 puffs into the lungs every 4 (four) hours as needed for Wheezing.  1 Inhaler 0   • Multiple Vitamin (MULTI rheumatology for further evaluation. Will start norco for refractory symptoms - reviewed medication side effects and precautions. Advised to pretreat with benadryl d/t opioid induced pruritus and use zofran prn nausea d/t opioids.  Reviewed return and call without long-term current use of insulin (HCC)     Subclinical hypothyroidism     Transaminitis

## 2021-07-14 NOTE — PATIENT INSTRUCTIONS
1. Continue prednisone, start allopurinol. 2. Follow up with rheumatology tomorrow . 3. For refractory symptoms, start Norco as needed. Stop tramadol. Can take benadryl as needed for itching. Can take zofran as needed for nausea.

## 2021-07-15 ENCOUNTER — OFFICE VISIT (OUTPATIENT)
Dept: RHEUMATOLOGY | Facility: CLINIC | Age: 46
End: 2021-07-15
Payer: COMMERCIAL

## 2021-07-15 ENCOUNTER — LAB ENCOUNTER (OUTPATIENT)
Dept: LAB | Age: 46
End: 2021-07-15
Attending: INTERNAL MEDICINE
Payer: COMMERCIAL

## 2021-07-15 VITALS
WEIGHT: 293 LBS | DIASTOLIC BLOOD PRESSURE: 70 MMHG | HEART RATE: 98 BPM | OXYGEN SATURATION: 98 % | HEIGHT: 67.5 IN | TEMPERATURE: 98 F | BODY MASS INDEX: 45.45 KG/M2 | SYSTOLIC BLOOD PRESSURE: 126 MMHG

## 2021-07-15 DIAGNOSIS — M10.09 IDIOPATHIC GOUT OF MULTIPLE SITES, UNSPECIFIED CHRONICITY: ICD-10-CM

## 2021-07-15 DIAGNOSIS — Z13.71 SCREENING FOR GENETIC DISEASE CARRIER STATUS: ICD-10-CM

## 2021-07-15 DIAGNOSIS — E11.69 TYPE 2 DIABETES MELLITUS WITH OTHER SPECIFIED COMPLICATION, UNSPECIFIED WHETHER LONG TERM INSULIN USE (HCC): ICD-10-CM

## 2021-07-15 DIAGNOSIS — M25.472 LEFT ANKLE EFFUSION: ICD-10-CM

## 2021-07-15 DIAGNOSIS — M10.09 IDIOPATHIC GOUT OF MULTIPLE SITES, UNSPECIFIED CHRONICITY: Primary | ICD-10-CM

## 2021-07-15 DIAGNOSIS — M25.472 LEFT ANKLE EFFUSION: Primary | ICD-10-CM

## 2021-07-15 LAB
BASOPHILS NFR SNV: 0 %
CRYSTALS SNV QL MICRO: NEGATIVE
EOSINOPHIL NFR SNV: 0 %
LYMPHOCYTES NFR SNV: 17 %
MONOS+MACROS NFR SNV: 5 %
NEUTROPHIL SYNOVIAL FLUID: 78 %
TOTAL CELLS COUNTED: 100

## 2021-07-15 PROCEDURE — 87205 SMEAR GRAM STAIN: CPT | Performed by: INTERNAL MEDICINE

## 2021-07-15 PROCEDURE — 3008F BODY MASS INDEX DOCD: CPT | Performed by: INTERNAL MEDICINE

## 2021-07-15 PROCEDURE — 89050 BODY FLUID CELL COUNT: CPT | Performed by: INTERNAL MEDICINE

## 2021-07-15 PROCEDURE — 20605 DRAIN/INJ JOINT/BURSA W/O US: CPT | Performed by: INTERNAL MEDICINE

## 2021-07-15 PROCEDURE — 89060 EXAM SYNOVIAL FLUID CRYSTALS: CPT | Performed by: INTERNAL MEDICINE

## 2021-07-15 PROCEDURE — 99244 OFF/OP CNSLTJ NEW/EST MOD 40: CPT | Performed by: INTERNAL MEDICINE

## 2021-07-15 PROCEDURE — 3078F DIAST BP <80 MM HG: CPT | Performed by: INTERNAL MEDICINE

## 2021-07-15 PROCEDURE — 87070 CULTURE OTHR SPECIMN AEROBIC: CPT | Performed by: INTERNAL MEDICINE

## 2021-07-15 PROCEDURE — 3074F SYST BP LT 130 MM HG: CPT | Performed by: INTERNAL MEDICINE

## 2021-07-15 RX ORDER — COLCHICINE 0.6 MG/1
0.6 TABLET ORAL DAILY
Qty: 30 TABLET | Refills: 2 | Status: SHIPPED | OUTPATIENT
Start: 2021-07-15 | End: 2021-07-21

## 2021-07-15 NOTE — PROGRESS NOTES
Procedure Note: Left ankle aspiration attempt and steroid injection. The risks, benefits, and alternatives of the procedure were explained, and verbal consent was obtained.  Area over anterolateral aspect of left ankle was prepped with CHX 2% x 3, then w

## 2021-07-15 NOTE — PATIENT INSTRUCTIONS
Start colchicine 0.6 mg daily    Stop allopurinol for now. We will need to get repeat blood work before restarting allopurinol. Continue prednisone taper as you have been doing per Dr. Duard Snellen instructions. Anakinra injection today.   Wait for me to

## 2021-07-15 NOTE — PROGRESS NOTES
Rheumatology New Patient Note  =====================================================================================================      Date of visit: 7/15/2021  ? Patient presents with:  Establish Care: New pt, referred by PCP for gout flare up.  LT daily., Disp: 30 tablet, Rfl: 2  HYDROcodone-acetaminophen (NORCO) 5-325 MG Oral Tab, Take 1 tablet by mouth every 6 (six) hours as needed for Pain., Disp: 12 tablet, Rfl: 0  allopurinol 100 MG Oral Tab, Take 1 tablet (100 mg total) by mouth daily. , Disp:  (90 Base) MCG/ACT Inhalation Aero Soln, Inhale 2 puffs into the lungs every 4 (four) hours as needed for Wheezing., Disp: 1 Inhaler, Rfl: 0  Multiple Vitamin (MULTI-VITAMIN DAILY OR), Take 1 tablet by mouth daily.   , Disp: , Rfl:       Modified Med DIARRHEA      Objective     07/15/21  1135   BP: 126/70   Pulse: 98   Temp: 97.9 °F (36.6 °C)       GEN: NAD, well-nourished. HEENT: Head: NCAT. Face: No lesions. Eyes: Conjunctiva clear. Sclera are anicteric. PERRLA. EOMs are full. Ears:  The right and 07/07/2021    NEUTABS 2,494 04/27/2013    LYMPHABS 1,415 04/27/2013    EOSABS 86 04/27/2013    BASABS 39 04/27/2013    NEUT 58.0 04/27/2013    LYMPH 32.9 04/27/2013    MON 6.2 04/27/2013    EOS 2.0 04/27/2013    BASO 0.9 04/27/2013    NEPERCENT 54.5 07/07/ (CST): Swathi Aaron MD on 7/07/2021 at 9:25 AM       [address] LEFT - DIAG IMG (KYL=[id number])    Result Date: 7/7/2021  CONCLUSION:  Negative DVT study.       Dictated by (CST): Cheyenne Denise MD on 7/07/2021 at 11:10 PM     Finalized by (CST): Jaswant Larson, CT/DIF & CRYSTAL SYNOVIAL; Future  -     COMP METABOLIC PANEL (14); Future  -     URIC ACID; Future  -     MISCELLANEOUS TESTING; Future  -     CK CREATINE KINASE (NOT CREATININE);  Future    Left ankle effusion  -     Cancel: EXAM,SYNOVIAL FLUID CRYSTALS;

## 2021-07-19 ENCOUNTER — TELEPHONE (OUTPATIENT)
Dept: RHEUMATOLOGY | Facility: CLINIC | Age: 46
End: 2021-07-19

## 2021-07-19 NOTE — TELEPHONE ENCOUNTER
Phoned pt, notified of negative cultures and hold allopurinol until her next appointment. Pt voiced understanding.

## 2021-07-19 NOTE — TELEPHONE ENCOUNTER
Pt called to say swelling has gone down significantly. Took last shot Saturday. Still feeling numbness of feet, especially on balls of feet. Walking faster and without as much pain, still slow. Feels she might be able to put on a gym shoe.  Had a really bad

## 2021-07-19 NOTE — TELEPHONE ENCOUNTER
Noted. Cultures still negative. I would still hold off on allopurinol for the time being until next clinic appointment later this week.

## 2021-07-21 ENCOUNTER — OFFICE VISIT (OUTPATIENT)
Dept: RHEUMATOLOGY | Facility: CLINIC | Age: 46
End: 2021-07-21
Payer: COMMERCIAL

## 2021-07-21 VITALS
OXYGEN SATURATION: 98 % | TEMPERATURE: 98 F | HEART RATE: 86 BPM | HEIGHT: 67.5 IN | WEIGHT: 293 LBS | DIASTOLIC BLOOD PRESSURE: 80 MMHG | BODY MASS INDEX: 45.45 KG/M2 | SYSTOLIC BLOOD PRESSURE: 120 MMHG

## 2021-07-21 DIAGNOSIS — R22.42 LOCALIZED SWELLING OF LEFT FOOT: ICD-10-CM

## 2021-07-21 DIAGNOSIS — M25.472 LEFT ANKLE EFFUSION: ICD-10-CM

## 2021-07-21 DIAGNOSIS — E11.69 TYPE 2 DIABETES MELLITUS WITH OTHER SPECIFIED COMPLICATION, UNSPECIFIED WHETHER LONG TERM INSULIN USE (HCC): ICD-10-CM

## 2021-07-21 DIAGNOSIS — M10.09 IDIOPATHIC GOUT OF MULTIPLE SITES, UNSPECIFIED CHRONICITY: Primary | ICD-10-CM

## 2021-07-21 PROCEDURE — 3008F BODY MASS INDEX DOCD: CPT | Performed by: INTERNAL MEDICINE

## 2021-07-21 PROCEDURE — 99214 OFFICE O/P EST MOD 30 MIN: CPT | Performed by: INTERNAL MEDICINE

## 2021-07-21 PROCEDURE — 3074F SYST BP LT 130 MM HG: CPT | Performed by: INTERNAL MEDICINE

## 2021-07-21 PROCEDURE — 3079F DIAST BP 80-89 MM HG: CPT | Performed by: INTERNAL MEDICINE

## 2021-07-21 RX ORDER — COLCHICINE 0.6 MG/1
TABLET ORAL
Qty: 97 TABLET | Refills: 1 | Status: SHIPPED | OUTPATIENT
Start: 2021-07-21 | End: 2021-10-19

## 2021-07-21 RX ORDER — PREDNISONE 1 MG/1
TABLET ORAL
Qty: 33 TABLET | Refills: 0 | Status: SHIPPED | OUTPATIENT
Start: 2021-07-21 | End: 2021-08-07

## 2021-07-21 NOTE — PATIENT INSTRUCTIONS
Remind phlebotomist of getting \"misc test: [id number]\" when you get lab draw:    Increase colchicine to twice daily for 1 week, then decrease to once daily thereafter. Prednisone:  Take 2 more days of Dr. Merlin Foreman 20 mg of prednisone, then take 4 tab

## 2021-07-21 NOTE — PROGRESS NOTES
Rheumatology Follow-up Patient Note  =====================================================================================================      Date of visit: 7/15/2021  ? Patient presents with:   Follow - Up: 1wk f/u, swelling did go down but did go b MG/DOSE, 4 MG/3ML Subcutaneous Solution Pen-injector, Inject 1 mg into the skin once a week., Disp: 3 mL, Rfl: 1  lisinopril 10 MG Oral Tab, Take 1 tablet (10 mg total) by mouth daily. , Disp: 90 tablet, Rfl: 3  Albuterol Sulfate HFA (PROAIR HFA) 108 (90 Ba disorders(424.0)    • Obesity    • SELINA (obstructive sleep apnea) 12/26/18 PSG    AHI 18 REM AHI 45 Supine AHI 49 Sao2 Freddy 69%    • Unspecified asthma(493.90)    • Unspecified gastritis and gastroduodenitis without mention of hemorrhage    • Unspecified s 07/07/2021    MCH 27.7 07/07/2021    MCHC 32.6 07/07/2021    RDW 13.7 07/07/2021    NEPRELIM 3.76 07/07/2021    NEUTABS 2,494 04/27/2013    LYMPHABS 1,415 04/27/2013    EOSABS 86 04/27/2013    BASABS 39 04/27/2013    NEUT 58.0 04/27/2013    LYMPH 32.9 04/2 Apollo Mckeon, MD on 7/07/2021 at 11:10 PM       =====================================================================================================  Assessment and Plan    Assessment:  Idiopathic gout of multiple sites, unspecified chronicity  (primary encount COMPLETE (XRT=23304); Future  -     colchicine 0.6 MG Oral Tab; Take 1 tablet (0.6 mg total) by mouth 2 (two) times daily for 7 days, THEN 1 tablet (0.6 mg total) daily. Localized swelling of left foot  -     US FOOT LEFT COMPLETE (CYE=24110);  Future  -

## 2021-07-23 DIAGNOSIS — E78.2 MIXED HYPERLIPIDEMIA: ICD-10-CM

## 2021-07-23 RX ORDER — ATORVASTATIN CALCIUM 10 MG/1
10 TABLET, FILM COATED ORAL NIGHTLY
Qty: 30 TABLET | Refills: 0 | OUTPATIENT
Start: 2021-07-23

## 2021-07-28 ENCOUNTER — OFFICE VISIT (OUTPATIENT)
Dept: INTERNAL MEDICINE CLINIC | Facility: CLINIC | Age: 46
End: 2021-07-28
Payer: COMMERCIAL

## 2021-07-28 VITALS
HEART RATE: 88 BPM | WEIGHT: 293 LBS | HEIGHT: 67.5 IN | DIASTOLIC BLOOD PRESSURE: 74 MMHG | SYSTOLIC BLOOD PRESSURE: 120 MMHG | RESPIRATION RATE: 16 BRPM | BODY MASS INDEX: 45.45 KG/M2

## 2021-07-28 DIAGNOSIS — G47.33 OSA ON CPAP: ICD-10-CM

## 2021-07-28 DIAGNOSIS — E11.9 TYPE 2 DIABETES MELLITUS WITHOUT COMPLICATION, WITHOUT LONG-TERM CURRENT USE OF INSULIN (HCC): ICD-10-CM

## 2021-07-28 DIAGNOSIS — R45.89 DYSPHORIC MOOD: ICD-10-CM

## 2021-07-28 DIAGNOSIS — Z51.81 THERAPEUTIC DRUG MONITORING: Primary | ICD-10-CM

## 2021-07-28 DIAGNOSIS — E66.01 CLASS 3 SEVERE OBESITY WITH SERIOUS COMORBIDITY AND BODY MASS INDEX (BMI) OF 50.0 TO 59.9 IN ADULT, UNSPECIFIED OBESITY TYPE (HCC): ICD-10-CM

## 2021-07-28 DIAGNOSIS — E78.2 MIXED HYPERLIPIDEMIA: ICD-10-CM

## 2021-07-28 DIAGNOSIS — Z99.89 OSA ON CPAP: ICD-10-CM

## 2021-07-28 DIAGNOSIS — I10 ESSENTIAL HYPERTENSION: ICD-10-CM

## 2021-07-28 PROCEDURE — 3008F BODY MASS INDEX DOCD: CPT | Performed by: PHYSICIAN ASSISTANT

## 2021-07-28 PROCEDURE — 3074F SYST BP LT 130 MM HG: CPT | Performed by: PHYSICIAN ASSISTANT

## 2021-07-28 PROCEDURE — 99214 OFFICE O/P EST MOD 30 MIN: CPT | Performed by: PHYSICIAN ASSISTANT

## 2021-07-28 PROCEDURE — 3078F DIAST BP <80 MM HG: CPT | Performed by: PHYSICIAN ASSISTANT

## 2021-07-28 RX ORDER — BUPROPION HYDROCHLORIDE 150 MG/1
150 TABLET ORAL DAILY
Qty: 30 TABLET | Refills: 0 | Status: SHIPPED | OUTPATIENT
Start: 2021-07-28 | End: 2021-11-02

## 2021-07-28 NOTE — PROGRESS NOTES
HISTORY OF PRESENT ILLNESS  Patient presents with:  Weight Check: down 4 lbs       Nick Welch is a 50year old female here for follow up in medical weight loss program.   Down 4lbs  Compliant on ozempic  Denies chest pain, shortness of breath, HSM  EXTREMITIES: no cyanosis, no clubbing, no edema    Lab Results   Component Value Date    WBC 6.9 07/07/2021    RBC 4.58 07/07/2021    HGB 12.7 07/07/2021    HCT 39.0 07/07/2021    MCV 85.2 07/07/2021    MCH 27.7 07/07/2021    MCHC 32.6 07/07/2021    R hours as needed for Pain., Disp: 12 tablet, Rfl: 0  allopurinol 100 MG Oral Tab, Take 1 tablet (100 mg total) by mouth daily. , Disp: 30 tablet, Rfl: 2  ondansetron 4 MG Oral Tablet Dispersible, Take 1 tablet (4 mg total) by mouth every 8 (eight) hours as n (BMI) of 50.0 to 59.9 in adult, unspecified obesity type ([address]    Dysphoric mood  -     OP REFERRAL TO [facility]    Type 2 diabetes mellitus without complication, without long-term current use of insulin (HCC)    Essential hypertension    Mixed hyperlipidem time spent on chart review, pre-charting, obtaining history, counseling, and educating, reviewing labs was 35 minutes. There are no Patient Instructions on file for this visit. Return in about 4 weeks (around 8/25/2021) for weight management.     Pa

## 2021-07-29 ENCOUNTER — LAB ENCOUNTER (OUTPATIENT)
Dept: LAB | Age: 46
End: 2021-07-29
Attending: INTERNAL MEDICINE
Payer: COMMERCIAL

## 2021-07-29 DIAGNOSIS — Z13.71 SCREENING FOR GENETIC DISEASE CARRIER STATUS: ICD-10-CM

## 2021-07-29 DIAGNOSIS — M10.09 IDIOPATHIC GOUT OF MULTIPLE SITES, UNSPECIFIED CHRONICITY: ICD-10-CM

## 2021-07-29 LAB
ALBUMIN SERPL-MCNC: 3.4 G/DL (ref 3.4–5)
ALBUMIN/GLOB SERPL: 1 {RATIO} (ref 1–2)
ALP LIVER SERPL-CCNC: 107 U/L
ALT SERPL-CCNC: 53 U/L
ANION GAP SERPL CALC-SCNC: 3 MMOL/L (ref 0–18)
AST SERPL-CCNC: 23 U/L (ref 15–37)
BILIRUB SERPL-MCNC: 0.9 MG/DL (ref 0.1–2)
BUN BLD-MCNC: 17 MG/DL (ref 7–18)
BUN/CREAT SERPL: 16.7 (ref 10–20)
CALCIUM BLD-MCNC: 8.8 MG/DL (ref 8.5–10.1)
CHLORIDE SERPL-SCNC: 109 MMOL/L (ref 98–112)
CK SERPL-CCNC: 145 U/L
CO2 SERPL-SCNC: 29 MMOL/L (ref 21–32)
CREAT BLD-MCNC: 1.02 MG/DL
GLOBULIN PLAS-MCNC: 3.5 G/DL (ref 2.8–4.4)
GLUCOSE BLD-MCNC: 108 MG/DL (ref 70–99)
M PROTEIN MFR SERPL ELPH: 6.9 G/DL (ref 6.4–8.2)
OSMOLALITY SERPL CALC.SUM OF ELEC: 294 MOSM/KG (ref 275–295)
PATIENT FASTING Y/N/NP: YES
POTASSIUM SERPL-SCNC: 3.8 MMOL/L (ref 3.5–5.1)
SODIUM SERPL-SCNC: 141 MMOL/L (ref 136–145)
URATE SERPL-MCNC: 7.3 MG/DL

## 2021-07-29 PROCEDURE — 84550 ASSAY OF BLOOD/URIC ACID: CPT | Performed by: INTERNAL MEDICINE

## 2021-07-29 PROCEDURE — 82550 ASSAY OF CK (CPK): CPT | Performed by: INTERNAL MEDICINE

## 2021-07-29 PROCEDURE — 80053 COMPREHEN METABOLIC PANEL: CPT | Performed by: INTERNAL MEDICINE

## 2021-08-04 ENCOUNTER — TELEPHONE (OUTPATIENT)
Dept: RHEUMATOLOGY | Facility: CLINIC | Age: 46
End: 2021-08-04

## 2021-08-04 RX ORDER — PREDNISONE 1 MG/1
TABLET ORAL
Qty: 32 TABLET | Refills: 1 | Status: SHIPPED | OUTPATIENT
Start: 2021-08-04 | End: 2021-08-14

## 2021-08-04 RX ORDER — ALLOPURINOL 100 MG/1
TABLET ORAL
Qty: 195 TABLET | Refills: 0 | Status: SHIPPED | OUTPATIENT
Start: 2021-08-04 | End: 2021-11-02

## 2021-08-04 NOTE — TELEPHONE ENCOUNTER
Phoned pt, and notified of Dr. Ronald Slater result note with medication management. Will send a [facility] message for her to further review. Pt asking appropriate questions and verbalizes understanding.     Pt has been taking colchicine 0.6mg twice daily, will clarify with Dr. Ronald Slater

## 2021-08-04 NOTE — TELEPHONE ENCOUNTER
Patient noted HLA B 5801 haplotype was negative. Ok to start on allopurinol after the current flare is resolved. Please have her start on allopurinol up titration as noted below. Continue on colchicine 0.6 mg daily. If she has a breakthrough gout flare she should take prednisone taper as noted below. She should pick this up and keep it on hand if this flare were to happen. After 2 months of allopurinol she should be on the 200 mg dose for 2 weeks. At that point she should obtain blood work that is ready ordered in the system by me to recheck uric acid     if rash occurs, patient should stop medication immediately. Higher risk of this in CKD, starting dose should be 50 mg in CKD. This medication may worsen frequency of gout flares prior to serum urate homeostasis. 1. Idiopathic gout of multiple sites, unspecified chronicity  - allopurinol 100 MG Oral Tab; Take 1 tablet (100 mg total) by mouth daily for 15 days, THEN 1.5 tablets (150 mg total) daily for 15 days, THEN 2 tablets (200 mg total) daily. Dispense: 195 tablet; Refill: 0    - predniSONE 5 MG Oral Tab; Take 6 tablets (30 mg total) by mouth daily for 2 days, THEN 4 tablets (20 mg total) daily for 2 days, THEN 3 tablets (15 mg total) daily for 2 days, THEN 2 tablets (10 mg total) daily for 2 days, THEN 1 tablet (5 mg total) daily for 2 days. For gout flares only. Dispense: 32 tablet; Refill: 1    - COMP METABOLIC PANEL (14); Future  - URIC ACID;  Future

## 2021-08-04 NOTE — TELEPHONE ENCOUNTER
Clarified colchicine 0.6mg should be taken once daily per Dr. Karissa Carr. Pt notified and voiced understanding.

## 2021-08-31 ENCOUNTER — TELEMEDICINE (OUTPATIENT)
Dept: INTERNAL MEDICINE CLINIC | Facility: CLINIC | Age: 46
End: 2021-08-31

## 2021-08-31 DIAGNOSIS — E11.9 TYPE 2 DIABETES MELLITUS WITHOUT COMPLICATION, WITHOUT LONG-TERM CURRENT USE OF INSULIN (HCC): ICD-10-CM

## 2021-08-31 DIAGNOSIS — F19.982 DRUG-INDUCED INSOMNIA (HCC): ICD-10-CM

## 2021-08-31 DIAGNOSIS — R45.89 DYSPHORIC MOOD: ICD-10-CM

## 2021-08-31 DIAGNOSIS — E66.01 CLASS 3 SEVERE OBESITY WITH SERIOUS COMORBIDITY AND BODY MASS INDEX (BMI) OF 50.0 TO 59.9 IN ADULT, UNSPECIFIED OBESITY TYPE (HCC): ICD-10-CM

## 2021-08-31 DIAGNOSIS — Z51.81 THERAPEUTIC DRUG MONITORING: Primary | ICD-10-CM

## 2021-08-31 PROCEDURE — 99214 OFFICE O/P EST MOD 30 MIN: CPT | Performed by: INTERNAL MEDICINE

## 2021-08-31 RX ORDER — DIETHYLPROPION HYDROCHLORIDE 75 MG/1
1 TABLET ORAL EVERY MORNING
Qty: 30 TABLET | Refills: 1 | Status: SHIPPED | OUTPATIENT
Start: 2021-08-31 | End: 2021-09-30

## 2021-08-31 RX ORDER — BUPROPION HYDROCHLORIDE 150 MG/1
150 TABLET ORAL DAILY
Qty: 30 TABLET | Refills: 2 | Status: SHIPPED | OUTPATIENT
Start: 2021-08-31 | End: 2021-11-02

## 2021-08-31 NOTE — PROGRESS NOTES
HISTORY OF PRESENT ILLNESS  Patient presents with:  Weight Check: video      Moses Logan is a 50year old female here for follow up in medical weight loss program.     Denies chest pain, shortness of breath, dizziness, blurred vision, headache, CREATSERUM 1.02 07/29/2021    ANIONGAP 3 07/29/2021    GFR 78 01/03/2018    GFRNAA 66 07/29/2021    GFRAA 76 07/29/2021    CA 8.8 07/29/2021    OSMOCALC 294 07/29/2021    ALKPHO 107 (H) 07/29/2021    AST 23 07/29/2021    ALT 53 07/29/2021    BILT 0.9 07/29 12 tablet, Rfl: 0  atorvastatin 20 MG Oral Tab, Take 1 tablet (20 mg total) by mouth nightly., Disp: 90 tablet, Rfl: 2  lisinopril 10 MG Oral Tab, Take 1 tablet (10 mg total) by mouth daily. , Disp: 90 tablet, Rfl: 3  Albuterol Sulfate HFA (PROAIR HFA) 108 total) by mouth every morning.         PLAN  Initial Weight: 331lbs  Initial Weight Date: 3/31/21  Today's Weight: 332lbs  5% Goal: 16.55lbs  10% Goal: 33.1lbs  Total Weight L oss: Down 4lbs/Net gain 1lb    Total time spent on chart review, pre-charting, ob

## 2021-09-02 ENCOUNTER — TELEPHONE (OUTPATIENT)
Dept: INTERNAL MEDICINE CLINIC | Facility: CLINIC | Age: 46
End: 2021-09-02

## 2021-09-02 NOTE — TELEPHONE ENCOUNTER
PA requested for diethylpropion on [facility]  [id number]    Tried to entered and got this message:  ? Information regarding your request  DIETHYLPROPION HCL ER 75 MG TABLET ER is not covered for this Member.  For formulary alternatives, please view the Standard or

## 2021-09-30 ENCOUNTER — TELEPHONE (OUTPATIENT)
Dept: RHEUMATOLOGY | Facility: CLINIC | Age: 46
End: 2021-09-30

## 2021-09-30 ENCOUNTER — PATIENT MESSAGE (OUTPATIENT)
Dept: RHEUMATOLOGY | Facility: CLINIC | Age: 46
End: 2021-09-30

## 2021-09-30 DIAGNOSIS — M10.09 IDIOPATHIC GOUT OF MULTIPLE SITES, UNSPECIFIED CHRONICITY: Primary | ICD-10-CM

## 2021-09-30 DIAGNOSIS — K21.9 GASTROESOPHAGEAL REFLUX DISEASE, UNSPECIFIED WHETHER ESOPHAGITIS PRESENT: ICD-10-CM

## 2021-09-30 DIAGNOSIS — Z79.52 CURRENT USE OF STEROID MEDICATION: ICD-10-CM

## 2021-09-30 RX ORDER — PANTOPRAZOLE SODIUM 20 MG/1
20 TABLET, DELAYED RELEASE ORAL
Qty: 15 TABLET | Refills: 0 | Status: SHIPPED | OUTPATIENT
Start: 2021-09-30 | End: 2021-12-01

## 2021-09-30 RX ORDER — PREDNISONE 10 MG/1
TABLET ORAL
Qty: 40 TABLET | Refills: 1 | Status: SHIPPED | OUTPATIENT
Start: 2021-09-30 | End: 2021-10-16

## 2021-09-30 NOTE — TELEPHONE ENCOUNTER
----- Message from Eleanor Anderson RN sent at 9/30/2021  7:49 AM CDT -----  Regarding: FW: Left ankle pain    ----- Message -----  From: Salvador Raymundo  Sent: 9/30/2021   6:36 AM CDT  To: [facility] Clinical Staff  Subject: Left ankle pain

## 2021-09-30 NOTE — TELEPHONE ENCOUNTER
10 days ago she had a gout flare  Prednisone 30 mg taper over 10 days. Iced it and   Still using 0.6 mg of colchicine  alloppinrol 100 mg daily. Using naproxen 500 mg twice a day.   Needs a higher dose and longer course of prednisone when she was taking

## 2021-10-05 NOTE — TELEPHONE ENCOUNTER
From: Aleida Granados  To: Vikki Hartley MD  Sent: 9/30/2021 6:36 AM CDT  Subject: Left ankle pain    Good morning Dr. Silverio Tracy,    About 10 days ago, I started developing left outer ankle pain and minor edema.  This pain was just like the pain I experie

## 2021-10-08 ENCOUNTER — PATIENT MESSAGE (OUTPATIENT)
Dept: INTERNAL MEDICINE CLINIC | Facility: CLINIC | Age: 46
End: 2021-10-08

## 2021-10-08 NOTE — TELEPHONE ENCOUNTER
From: Neil Granados  To: Morgan Bai MD  Sent: 10/8/2021 5:32 AM CDT  Subject: Diethylpropion     Good morning,   I found a much cheaper price on GoodRx for Diethylpropion at [facility].  Wow! $19.84 compared to $55 at [facility]. Can I please have an o

## 2021-10-08 NOTE — TELEPHONE ENCOUNTER
RX written 8/31/21 #30 with 1 ref[place]. [place]30 for 30 days     I called [place] and spoke with Kajal.   She said patient did  #30 on 9/27/21 of diethylpropion    11/2/2021  2:40 PM MD BRETT Abad [facility]       Sh

## 2021-10-08 NOTE — TELEPHONE ENCOUNTER
I was finally able to verify [place] and [place] show 8/31/21. I called back [facility] and she thought I asked about Bupropion.   She will cancel the order for diethylpropion there and I will call to new pharmacy

## 2021-10-11 RX ORDER — DIETHYLPROPION HYDROCHLORIDE 75 MG/1
1 TABLET ORAL EVERY MORNING
Qty: 30 TABLET | Refills: 0 | OUTPATIENT
Start: 2021-10-11 | End: 2021-11-10

## 2021-11-02 ENCOUNTER — OFFICE VISIT (OUTPATIENT)
Dept: INTERNAL MEDICINE CLINIC | Facility: CLINIC | Age: 46
End: 2021-11-02
Payer: COMMERCIAL

## 2021-11-02 VITALS
BODY MASS INDEX: 45.45 KG/M2 | WEIGHT: 293 LBS | SYSTOLIC BLOOD PRESSURE: 140 MMHG | DIASTOLIC BLOOD PRESSURE: 82 MMHG | HEIGHT: 67.5 IN | HEART RATE: 88 BPM | RESPIRATION RATE: 16 BRPM

## 2021-11-02 DIAGNOSIS — R45.89 DYSPHORIC MOOD: ICD-10-CM

## 2021-11-02 DIAGNOSIS — E11.9 TYPE 2 DIABETES MELLITUS WITHOUT COMPLICATION, WITHOUT LONG-TERM CURRENT USE OF INSULIN (HCC): ICD-10-CM

## 2021-11-02 DIAGNOSIS — E66.01 CLASS 3 SEVERE OBESITY WITH SERIOUS COMORBIDITY AND BODY MASS INDEX (BMI) OF 50.0 TO 59.9 IN ADULT, UNSPECIFIED OBESITY TYPE (HCC): ICD-10-CM

## 2021-11-02 DIAGNOSIS — Z51.81 THERAPEUTIC DRUG MONITORING: Primary | ICD-10-CM

## 2021-11-02 PROCEDURE — 3079F DIAST BP 80-89 MM HG: CPT | Performed by: INTERNAL MEDICINE

## 2021-11-02 PROCEDURE — 99214 OFFICE O/P EST MOD 30 MIN: CPT | Performed by: INTERNAL MEDICINE

## 2021-11-02 PROCEDURE — 3077F SYST BP >= 140 MM HG: CPT | Performed by: INTERNAL MEDICINE

## 2021-11-02 PROCEDURE — 3008F BODY MASS INDEX DOCD: CPT | Performed by: INTERNAL MEDICINE

## 2021-11-02 RX ORDER — BUPROPION HYDROCHLORIDE 300 MG/1
300 TABLET ORAL DAILY
Qty: 30 TABLET | Refills: 2 | Status: SHIPPED | OUTPATIENT
Start: 2021-11-02

## 2021-11-02 RX ORDER — COLCHICINE 0.6 MG/1
TABLET ORAL
COMMUNITY
Start: 2021-10-29 | End: 2021-11-24

## 2021-11-02 NOTE — PROGRESS NOTES
HISTORY OF PRESENT ILLNESS  Patient presents with:  Weight Check: down 5 lb      Salvador Raymundo is a 50year old female here for follow up in medical weight loss program.     Denies chest pain, shortness of breath, dizziness, blurred vision, headac 27.7 07/07/2021    MCHC 32.6 07/07/2021    RDW 13.7 07/07/2021    .0 07/07/2021     Lab Results   Component Value Date     (H) 07/29/2021    BUN 17 07/29/2021    BUNCREA 16.7 07/29/2021    CREATSERUM 1.02 07/29/2021    ANIONGAP 3 07/29/2021 by mouth every 8 (eight) hours as needed for Nausea., Disp: 12 tablet, Rfl: 0  atorvastatin 20 MG Oral Tab, Take 1 tablet (20 mg total) by mouth nightly., Disp: 90 tablet, Rfl: 2  lisinopril 10 MG Oral Tab, Take 1 tablet (10 mg total) by mouth daily. , Disp buPROPion 300 MG Oral Tablet 24 Hr; Take 1 tablet (300 mg total) by mouth daily. -     empagliflozin (JARDIANCE) 10 MG Oral Tab; Take 1 tablet (10 mg total) by mouth daily.         PLAN  · Reconsult weight 336  · Total time spent on chart review, pre-chart

## 2021-11-05 RX ORDER — SEMAGLUTIDE 1.34 MG/ML
INJECTION, SOLUTION SUBCUTANEOUS
Qty: 3 ML | Refills: 2 | Status: SHIPPED | OUTPATIENT
Start: 2021-11-05 | End: 2022-01-17

## 2021-11-12 ENCOUNTER — OFFICE VISIT (OUTPATIENT)
Dept: FAMILY MEDICINE CLINIC | Facility: CLINIC | Age: 46
End: 2021-11-12
Payer: COMMERCIAL

## 2021-11-12 VITALS
DIASTOLIC BLOOD PRESSURE: 84 MMHG | HEART RATE: 95 BPM | OXYGEN SATURATION: 97 % | RESPIRATION RATE: 16 BRPM | SYSTOLIC BLOOD PRESSURE: 120 MMHG | HEIGHT: 67.5 IN | WEIGHT: 293 LBS | BODY MASS INDEX: 45.45 KG/M2

## 2021-11-12 DIAGNOSIS — I10 ESSENTIAL HYPERTENSION: ICD-10-CM

## 2021-11-12 DIAGNOSIS — Z13.0 SCREENING FOR ENDOCRINE, NUTRITIONAL, METABOLIC AND IMMUNITY DISORDER: ICD-10-CM

## 2021-11-12 DIAGNOSIS — Z13.21 SCREENING FOR ENDOCRINE, NUTRITIONAL, METABOLIC AND IMMUNITY DISORDER: ICD-10-CM

## 2021-11-12 DIAGNOSIS — E11.9 TYPE 2 DIABETES MELLITUS WITHOUT COMPLICATION, WITHOUT LONG-TERM CURRENT USE OF INSULIN (HCC): ICD-10-CM

## 2021-11-12 DIAGNOSIS — Z00.00 ROUTINE GENERAL MEDICAL EXAMINATION AT A HEALTH CARE FACILITY: Primary | ICD-10-CM

## 2021-11-12 DIAGNOSIS — Z13.29 SCREENING FOR ENDOCRINE, NUTRITIONAL, METABOLIC AND IMMUNITY DISORDER: ICD-10-CM

## 2021-11-12 DIAGNOSIS — J45.20 INTERMITTENT ASTHMA, WELL CONTROLLED: ICD-10-CM

## 2021-11-12 DIAGNOSIS — E78.2 MIXED HYPERLIPIDEMIA: ICD-10-CM

## 2021-11-12 DIAGNOSIS — Z13.228 SCREENING FOR ENDOCRINE, NUTRITIONAL, METABOLIC AND IMMUNITY DISORDER: ICD-10-CM

## 2021-11-12 DIAGNOSIS — Z12.31 VISIT FOR SCREENING MAMMOGRAM: ICD-10-CM

## 2021-11-12 PROCEDURE — 3008F BODY MASS INDEX DOCD: CPT | Performed by: FAMILY MEDICINE

## 2021-11-12 PROCEDURE — 99396 PREV VISIT EST AGE 40-64: CPT | Performed by: FAMILY MEDICINE

## 2021-11-12 PROCEDURE — 3079F DIAST BP 80-89 MM HG: CPT | Performed by: FAMILY MEDICINE

## 2021-11-12 PROCEDURE — 3074F SYST BP LT 130 MM HG: CPT | Performed by: FAMILY MEDICINE

## 2021-11-12 RX ORDER — LOSARTAN POTASSIUM 25 MG/1
25 TABLET ORAL DAILY
Qty: 90 TABLET | Refills: 3 | Status: SHIPPED | OUTPATIENT
Start: 2021-11-12 | End: 2022-01-19 | Stop reason: RX

## 2021-11-12 RX ORDER — DIETHYLPROPION HYDROCHLORIDE 75 MG/1
75 TABLET ORAL DAILY
COMMUNITY
End: 2021-12-01

## 2021-11-12 NOTE — PROGRESS NOTES
Althea Jeronimo is a 50year old female who presents for a complete physical exam, no gyn. HPI:     Patient presents with:  Physical      Patient feels well, dental visit up to date, no hearing problem. Vaccinations up to date.   DM, morbid obesi Glucose Monitoring Suppl (ACCU-CHEK TARA PLUS) w/Device Does not apply Kit 1 Device by Other route 2 (two) times daily.  1 kit 0   • Glucose Blood (ACCU-CHEK TARA PLUS) In Vitro Strip Use as directed 1 strip once a week 100 strip 3   • Accu-Chek Softclix REVIEW OF SYSTEMS:   GENERAL HEALTH: feels well, no fatigue.   SKIN: denies any unusual skin lesions or rashes  EYES: no visual complaints or deficits  HEENT: denies nasal congestion, sinus pain or sore throat; hearing loss negative   RESPIRATORY: denie tones, no joints abnormalities. SKIN: Normal color, turgor, no lesions, rashes or wounds. PSYCH: Normal affect and mood.           ASSESSMENT AND PLAN:     Janet Garcia is a 50year old female who presents for a complete physical exam.   Pt's wa

## 2021-11-15 NOTE — TELEPHONE ENCOUNTER
Requesting   Requested Prescriptions     Pending Prescriptions Disp Refills   • DIETHYLPROPION HCL ER 75 MG Oral Tablet 24 Hr [Pharmacy Med Name: DIETHYLPROPION ER 75 MG TABLET] 30 tablet 0     Sig: TAKE 1 TABLET BY MOUTH EVERY DAY     LOV: 11/2/21  RTC: 3

## 2021-11-16 RX ORDER — DIETHYLPROPION HYDROCHLORIDE 75 MG/1
TABLET ORAL
Qty: 30 TABLET | Refills: 0 | Status: SHIPPED | OUTPATIENT
Start: 2021-11-16 | End: 2021-12-24

## 2021-11-22 ENCOUNTER — TELEPHONE (OUTPATIENT)
Dept: FAMILY MEDICINE CLINIC | Facility: CLINIC | Age: 46
End: 2021-11-22

## 2021-11-22 NOTE — TELEPHONE ENCOUNTER
Message left for pt to call office back. Laura Presume is being flagged d/t pt taking Colchicine. Please advise if OK to send Zpak Rx.

## 2021-11-22 NOTE — TELEPHONE ENCOUNTER
Pt is still experiencing cough and wheezing after being taken off  Linsinopril.   Pt would like to know what next steps are?

## 2021-11-23 RX ORDER — METHYLPREDNISOLONE 4 MG/1
TABLET ORAL
Qty: 1 EACH | Refills: 0 | Status: SHIPPED | OUTPATIENT
Start: 2021-11-23 | End: 2021-12-01 | Stop reason: ALTCHOICE

## 2021-11-23 RX ORDER — AZITHROMYCIN 250 MG/1
TABLET, FILM COATED ORAL
Qty: 6 TABLET | Refills: 0 | Status: SHIPPED | OUTPATIENT
Start: 2021-11-23 | End: 2021-11-28

## 2021-11-24 ENCOUNTER — OFFICE VISIT (OUTPATIENT)
Dept: RHEUMATOLOGY | Facility: CLINIC | Age: 46
End: 2021-11-24
Payer: COMMERCIAL

## 2021-11-24 VITALS
DIASTOLIC BLOOD PRESSURE: 82 MMHG | BODY MASS INDEX: 45.45 KG/M2 | SYSTOLIC BLOOD PRESSURE: 135 MMHG | OXYGEN SATURATION: 99 % | HEART RATE: 97 BPM | HEIGHT: 67.5 IN | WEIGHT: 293 LBS

## 2021-11-24 DIAGNOSIS — Z51.81 THERAPEUTIC DRUG MONITORING: ICD-10-CM

## 2021-11-24 DIAGNOSIS — M10.09 IDIOPATHIC GOUT OF MULTIPLE SITES, UNSPECIFIED CHRONICITY: Primary | ICD-10-CM

## 2021-11-24 PROCEDURE — 3008F BODY MASS INDEX DOCD: CPT | Performed by: INTERNAL MEDICINE

## 2021-11-24 PROCEDURE — 3075F SYST BP GE 130 - 139MM HG: CPT | Performed by: INTERNAL MEDICINE

## 2021-11-24 PROCEDURE — 99214 OFFICE O/P EST MOD 30 MIN: CPT | Performed by: INTERNAL MEDICINE

## 2021-11-24 PROCEDURE — 3079F DIAST BP 80-89 MM HG: CPT | Performed by: INTERNAL MEDICINE

## 2021-11-24 RX ORDER — COLCHICINE 0.6 MG/1
0.6 TABLET ORAL DAILY
Qty: 90 TABLET | Refills: 1 | Status: SHIPPED | OUTPATIENT
Start: 2021-11-24

## 2021-11-24 RX ORDER — PREDNISONE 1 MG/1
TABLET ORAL
Qty: 32 TABLET | Refills: 2 | Status: SHIPPED | OUTPATIENT
Start: 2021-11-24 | End: 2021-12-04 | Stop reason: ALTCHOICE

## 2021-11-24 RX ORDER — ALLOPURINOL 100 MG/1
TABLET ORAL
COMMUNITY
Start: 2021-11-04 | End: 2021-11-24

## 2021-11-24 RX ORDER — ALLOPURINOL 100 MG/1
200 TABLET ORAL DAILY
Qty: 180 TABLET | Refills: 1 | Status: SHIPPED | OUTPATIENT
Start: 2021-11-24

## 2021-11-24 NOTE — PATIENT INSTRUCTIONS
Increase allopurinol 100 mg to allopurinol 200 mg (2 tabs) daily, then get repeat bloodwork in 2 weeks. Continue colchicine 0.6 mg daily after you finish azithromycin. If you have a gout flare,  take prednisone taper.      Get ultrasound of feet and

## 2021-11-24 NOTE — PROGRESS NOTES
Rheumatology Follow-up Patient Note  =====================================================================================================  ? Patient presents with: Follow - Up: 4mo f/u, no ultrasound completed for this visit. ?   Referring  Rm Little Oral Tablet Therapy Pack, As directed per package. , Disp: 1 each, Rfl: 0  azithromycin (ZITHROMAX Z-BERNARD) 250 MG Oral Tab, Take 2 tablets (500 mg total) by mouth daily for 1 day, THEN 1 tablet (250 mg total) daily for 4 days. , Disp: 6 tablet, Rfl: 0  DIETHY 1 tablet by mouth daily. , Disp: , Rfl:       Modified Medications    Modified Medication Previous Medication    ALLOPURINOL 100 MG ORAL TAB allopurinol 100 MG Oral Tab       Take 2 tablets (200 mg total) by mouth daily.         COLCHICINE 0.6 MG ORAL TAB Comment:TABS  Lortab                  HIVES    Comment:ASA TABS  Morphine                HIVES    Comment:Derivatives  Metformin               DIARRHEA      Objective     11/24/21  0837   BP: 135/82   Pulse: 97   SpO2: 99%   Weight: (!) 336 lb (152.4 kg) C3, C4  No results found for: DRVVT, LAINT, PTTLUPUS, LUPUSINTERP, LA, Z8NV5WLWWO, L5LP0WPMNF, P6YWFZOAXI, G4EHHFEJBM  No results found for: Aletha Ramos, [address], [address], [place]      Additional Labs:    Results for ROSEANN MCFARLANE multiple sites, unspecified chronicity  -     COMP METABOLIC PANEL (14); Future  -     URIC ACID; Future  -     CK CREATINE KINASE (NOT CREATININE); Future  -     colchicine 0.6 MG Oral Tab; Take 1 tablet (0.6 mg total) by mouth daily.   -     allopurinol 1

## 2021-11-28 RX ORDER — BUPROPION HYDROCHLORIDE 150 MG/1
150 TABLET ORAL DAILY
Qty: 30 TABLET | Refills: 0 | OUTPATIENT
Start: 2021-11-28

## 2021-11-28 NOTE — TELEPHONE ENCOUNTER
Requesting Bupropion 150 mg  LOV: 11/2/21  RTC: 3 months  Last Relevant Labs: na  Filled: PT WAS INCREASED  MG AND THAT WAS SENT 11/2/21 FOR 3 MONTHS    Future Appointments   Date Time Provider Stacia Edwards   1/11/2022  9:40 AM Dilan Palm MD EM

## 2021-12-01 ENCOUNTER — PATIENT MESSAGE (OUTPATIENT)
Dept: FAMILY MEDICINE CLINIC | Facility: CLINIC | Age: 46
End: 2021-12-01

## 2021-12-01 ENCOUNTER — OFFICE VISIT (OUTPATIENT)
Dept: FAMILY MEDICINE CLINIC | Facility: CLINIC | Age: 46
End: 2021-12-01
Payer: COMMERCIAL

## 2021-12-01 VITALS
HEART RATE: 119 BPM | WEIGHT: 293 LBS | SYSTOLIC BLOOD PRESSURE: 132 MMHG | BODY MASS INDEX: 45.45 KG/M2 | RESPIRATION RATE: 16 BRPM | DIASTOLIC BLOOD PRESSURE: 86 MMHG | OXYGEN SATURATION: 98 % | HEIGHT: 67.5 IN

## 2021-12-01 DIAGNOSIS — Q38.3 TONGUE ABNORMALITY: Primary | ICD-10-CM

## 2021-12-01 DIAGNOSIS — K21.9 GASTROESOPHAGEAL REFLUX DISEASE, UNSPECIFIED WHETHER ESOPHAGITIS PRESENT: ICD-10-CM

## 2021-12-01 PROCEDURE — 99214 OFFICE O/P EST MOD 30 MIN: CPT | Performed by: FAMILY MEDICINE

## 2021-12-01 PROCEDURE — 3008F BODY MASS INDEX DOCD: CPT | Performed by: FAMILY MEDICINE

## 2021-12-01 PROCEDURE — 3075F SYST BP GE 130 - 139MM HG: CPT | Performed by: FAMILY MEDICINE

## 2021-12-01 PROCEDURE — 3079F DIAST BP 80-89 MM HG: CPT | Performed by: FAMILY MEDICINE

## 2021-12-01 RX ORDER — PANTOPRAZOLE SODIUM 40 MG/1
40 TABLET, DELAYED RELEASE ORAL
Qty: 30 TABLET | Refills: 1 | Status: SHIPPED | OUTPATIENT
Start: 2021-12-01

## 2021-12-01 RX ORDER — DEXAMETHASONE 0.5 MG/5ML
ELIXIR ORAL
Qty: 237 ML | Refills: 0 | Status: SHIPPED | OUTPATIENT
Start: 2021-12-01

## 2021-12-01 NOTE — TELEPHONE ENCOUNTER
From: Loli Granados  To: Radha Bowden MD  Sent: 12/1/2021 12:06 AM CST  Subject: Tongue Burning and Sore throat    Good evening.    For the past four days I have been experiencing tongue burning, spots on my tongue; not white, and a sore throat

## 2021-12-01 NOTE — PROGRESS NOTES
[facility] Office Note  Chief Complaint:   No chief complaint on file. HPI:   This is a 48year old female coming in for oral complaints. Reports that she wears a CPAP and had an episode 1-2 weeks prior of acid reflux.  Report (RA) Mother      Allergies:    Darvocet [Propoxyph*    HIVES    Comment:N 50 TABS  Demerol                 HIVES    Comment:TABS  Lortab                  HIVES    Comment:ASA TABS  Morphine                HIVES    Comment:Derivatives  Metformin 20 MG Oral Tab Take 1 tablet (20 mg total) by mouth nightly. 90 tablet 2   • lisinopril 10 MG Oral Tab Take 1 tablet (10 mg total) by mouth daily.  (Patient not taking: Reported on 11/24/2021) 90 tablet 3   • Albuterol Sulfate HFA (PROAIR HFA) 108 (90 Base) Mouth/Throat:      Mouth: Mucous membranes are moist.      Pharynx: No oropharyngeal exudate or posterior oropharyngeal erythema. Comments: White film adherent on tongue, unable to scrape off.    Eyes:      Pupils: Pupils are equal, round, and reactive symptoms. Patient is to call with any side effects or complications from the treatments as a result of today.      Problem List:  Patient Active Problem List:     Essential hypertension     Mixed hyperlipidemia     Intermittent asthma, well controlled

## 2021-12-01 NOTE — TELEPHONE ENCOUNTER
PSR: Please assist patient in scheduling appointment today with Kyaw Zapata or Dr. Jose Manuel Mancilla. Thank you.

## 2021-12-02 NOTE — PATIENT INSTRUCTIONS
Medicines for Acid Reflux  Your healthcare provider has told you that you have acid reflux. This condition causes stomach acid to wash up into your throat. For most people, acid reflux is troubling but not dangerous.  But left untreated, it can sometimes caffeine, and fizzy beverages. All increase acid reflux. · Try limiting chocolate, peppermint, and spearmint. These can make acid reflux worse in some people. Watch when you eat  · Don't lie down for 3 hours after eating.   · Don't snack before going to

## 2021-12-04 ENCOUNTER — OFFICE VISIT (OUTPATIENT)
Dept: FAMILY MEDICINE CLINIC | Facility: CLINIC | Age: 46
End: 2021-12-04
Payer: COMMERCIAL

## 2021-12-04 VITALS
OXYGEN SATURATION: 99 % | HEART RATE: 93 BPM | TEMPERATURE: 97 F | SYSTOLIC BLOOD PRESSURE: 122 MMHG | HEIGHT: 67.5 IN | RESPIRATION RATE: 16 BRPM | DIASTOLIC BLOOD PRESSURE: 75 MMHG | BODY MASS INDEX: 45.45 KG/M2 | WEIGHT: 293 LBS

## 2021-12-04 DIAGNOSIS — J02.9 SORE THROAT: Primary | ICD-10-CM

## 2021-12-04 PROCEDURE — 3074F SYST BP LT 130 MM HG: CPT | Performed by: NURSE PRACTITIONER

## 2021-12-04 PROCEDURE — 3078F DIAST BP <80 MM HG: CPT | Performed by: NURSE PRACTITIONER

## 2021-12-04 PROCEDURE — 3008F BODY MASS INDEX DOCD: CPT | Performed by: NURSE PRACTITIONER

## 2021-12-04 PROCEDURE — 99213 OFFICE O/P EST LOW 20 MIN: CPT | Performed by: NURSE PRACTITIONER

## 2021-12-04 PROCEDURE — U0002 COVID-19 LAB TEST NON-CDC: HCPCS | Performed by: NURSE PRACTITIONER

## 2021-12-04 PROCEDURE — 87102 FUNGUS ISOLATION CULTURE: CPT | Performed by: NURSE PRACTITIONER

## 2021-12-04 PROCEDURE — 87880 STREP A ASSAY W/OPTIC: CPT | Performed by: NURSE PRACTITIONER

## 2021-12-04 NOTE — PROGRESS NOTES
CHIEF COMPLAINT:   Patient presents with:  Sore Throat:  Body ache, fatigue - Entered by patient        HPI:   Moses Logan is a 48year old female with history of asthma, dm2, htn, bárbara, gerd, gout, presents to clinic with complaints of sore throa the mouth for five minutes prior to spitting it out. Do not rinse afterward and avoid eating or drinking for 30 minutes. 237 mL 0   • colchicine 0.6 MG Oral Tab Take 1 tablet (0.6 mg total) by mouth daily.  90 tablet 1   • allopurinol 100 MG Oral Tab Take 2 Accu-Chek Softclix Lancets Does not apply Misc Use as directed once a week 1 Box 3   • Multiple Vitamin (MULTI-VITAMIN DAILY OR) Take 1 tablet by mouth daily.           Past Medical History:   Diagnosis Date   • Asthma    • Atrial flutter ([facility] [address]    • COVID-1 patches. None noted buccal or sublingual. Uvula is midline. Breath is not malodorous. No trismus, hoarseness, muffled voice, or stridor. NECK: supple  LUNGS: clear to auscultation bilaterally. Breathing is non labored.   CARDIO: RRR without murmur  LYM throat spray to soothe sore throat. · Warm salt water gargles 2-3 times daily for at least 3 days. When You Have a Sore Throat  A sore throat can be painful. There are many reasons why you may have a sore throat.  Your healthcare provider will work exam, your healthcare provider checks your ears, nose, and throat for problems.  He or she also checks for swelling in the neck, and may listen to your chest.   Possible tests  Other tests your healthcare provider may perform include:   · A throat swab to c antibiotics when they’re not needed may lead to bacteria that are harder to kill. Your healthcare provider will prescribe antibiotics only if he or she thinks they are likely to help. If antibiotics are prescribed  Take the medicine exactly as directed. healthcare professional's instructions. Self-Care for Sore Throats  Sore throats happen for many reasons, such as colds, allergies, cigarette smoke, air pollution, and infections caused by viruses or bacteria.  In any case, your throat becomes red lining. · Limit contact with pets and with allergy-causing substances, such as pollen and mold. · Wash your hands often when you’re around someone with a sore throat or cold. This will keep viruses or bacteria from spreading.   · Limit outdoor time when a

## 2021-12-04 NOTE — PATIENT INSTRUCTIONS
Comfort measures explained and discussed:    · OTC Tylenol/ibuprofen as needed. · Push fluids- warm or cool liquids, whichever is soothing for patient. · Avoid caffeine. · Do not share utensils or drinks with anyone. · Good handwashing.     · G such as body aches, fever, or cough? · Does your sore throat recur? If so, how often? How many days of school or work have you missed because of a sore throat? · Do you have trouble eating or swallowing?   · Have you been told that you snore or have other bacterial infection, antibiotics may speed healing and prevent complications.  Although group A streptococcus (strep throat) is the major treatable infection for a sore throat, strep throat causes only 5% to 15% of sore throats in adults who seek medical ca catching your breath may be a medical emergency. · Skin is blue, purple or gray in color  · Trouble talking  · Feeling dizzy or faint  · Feeling of doom  Roel last reviewed this educational content on 7/1/2019 © 2000-2021 [employer].  A aspirin if you are already taking blood thinners.   · For sore throats caused by allergies, try antihistamines to block the allergic reaction. Unless a sore throat is caused by a bacterial infection, antibiotics won’t help you.    Prevent future sore throa

## 2021-12-08 RX ORDER — CLOTRIMAZOLE 10 MG/1
10 LOZENGE ORAL; TOPICAL
Qty: 50 LOZENGE | Refills: 0 | Status: SHIPPED | OUTPATIENT
Start: 2021-12-08 | End: 2021-12-18

## 2021-12-23 NOTE — TELEPHONE ENCOUNTER
Requesting Diethylpropion ER 75 mg  LOV: 11/2/21  RTC: 3 months  Last Relevant Labs: na  Filled: 11/16/21 #30 with 0 refills  [place]21 [place]    Future Appointments   Date Time Provider Stacia Edwards   1/11/2022  9:40 AM S

## 2021-12-24 RX ORDER — DIETHYLPROPION HYDROCHLORIDE 75 MG/1
TABLET ORAL
Qty: 30 TABLET | Refills: 0 | Status: SHIPPED | OUTPATIENT
Start: 2021-12-24 | End: 2022-01-17

## 2021-12-27 ENCOUNTER — LAB ENCOUNTER (OUTPATIENT)
Dept: LAB | Age: 46
End: 2021-12-27
Attending: FAMILY MEDICINE
Payer: COMMERCIAL

## 2021-12-27 DIAGNOSIS — Z13.0 SCREENING FOR ENDOCRINE, NUTRITIONAL, METABOLIC AND IMMUNITY DISORDER: ICD-10-CM

## 2021-12-27 DIAGNOSIS — E78.2 MIXED HYPERLIPIDEMIA: ICD-10-CM

## 2021-12-27 DIAGNOSIS — Z13.29 SCREENING FOR ENDOCRINE, NUTRITIONAL, METABOLIC AND IMMUNITY DISORDER: ICD-10-CM

## 2021-12-27 DIAGNOSIS — Z13.228 SCREENING FOR ENDOCRINE, NUTRITIONAL, METABOLIC AND IMMUNITY DISORDER: ICD-10-CM

## 2021-12-27 DIAGNOSIS — I10 ESSENTIAL HYPERTENSION: ICD-10-CM

## 2021-12-27 DIAGNOSIS — Z13.21 SCREENING FOR ENDOCRINE, NUTRITIONAL, METABOLIC AND IMMUNITY DISORDER: ICD-10-CM

## 2021-12-27 DIAGNOSIS — E11.9 TYPE 2 DIABETES MELLITUS WITHOUT COMPLICATION, WITHOUT LONG-TERM CURRENT USE OF INSULIN (HCC): ICD-10-CM

## 2021-12-27 PROCEDURE — 84443 ASSAY THYROID STIM HORMONE: CPT

## 2021-12-27 PROCEDURE — 82306 VITAMIN D 25 HYDROXY: CPT

## 2021-12-27 PROCEDURE — 82570 ASSAY OF URINE CREATININE: CPT

## 2021-12-27 PROCEDURE — 85025 COMPLETE CBC W/AUTO DIFF WBC: CPT

## 2021-12-27 PROCEDURE — 82043 UR ALBUMIN QUANTITATIVE: CPT

## 2021-12-27 PROCEDURE — 36415 COLL VENOUS BLD VENIPUNCTURE: CPT

## 2021-12-27 PROCEDURE — 80053 COMPREHEN METABOLIC PANEL: CPT

## 2021-12-27 PROCEDURE — 80061 LIPID PANEL: CPT

## 2021-12-27 PROCEDURE — 83036 HEMOGLOBIN GLYCOSYLATED A1C: CPT

## 2021-12-28 ENCOUNTER — TELEPHONE (OUTPATIENT)
Dept: FAMILY MEDICINE CLINIC | Facility: CLINIC | Age: 46
End: 2021-12-28

## 2021-12-28 ENCOUNTER — PATIENT MESSAGE (OUTPATIENT)
Dept: FAMILY MEDICINE CLINIC | Facility: CLINIC | Age: 46
End: 2021-12-28

## 2021-12-28 RX ORDER — ERGOCALCIFEROL 1.25 MG/1
50000 CAPSULE ORAL WEEKLY
Qty: 12 CAPSULE | Refills: 0 | Status: SHIPPED | OUTPATIENT
Start: 2021-12-28

## 2021-12-28 NOTE — TELEPHONE ENCOUNTER
Dr Saumya Alvarado     Just want to confirm on the Vit D instructions is it 5000IU or should be 50,000IU?     thanks

## 2021-12-28 NOTE — TELEPHONE ENCOUNTER
----- Message from Ev Zhou MD sent at 12/27/2021  5:12 PM CST -----  Low TGL diet, stable overall, needs vit. D 5000U weekly for next 4 months.

## 2021-12-28 NOTE — TELEPHONE ENCOUNTER
From: Jerardo Goldstein  Sent: 12/28/2021 4:04 PM CST  To: [facility] Clinical Staff  Subject: Lab results    What about my triglycerides? I Thank you! Have a great week.    Alisa Luevano

## 2021-12-29 ENCOUNTER — MED REC SCAN ONLY (OUTPATIENT)
Dept: FAMILY MEDICINE CLINIC | Facility: CLINIC | Age: 46
End: 2021-12-29

## 2021-12-29 ENCOUNTER — PATIENT MESSAGE (OUTPATIENT)
Dept: FAMILY MEDICINE CLINIC | Facility: CLINIC | Age: 46
End: 2021-12-29

## 2021-12-29 NOTE — TELEPHONE ENCOUNTER
Original form sent to scan. Copy w/fax confirmation kept in back. Sent pt [employer] message to let her know.

## 2022-01-17 ENCOUNTER — TELEMEDICINE (OUTPATIENT)
Dept: INTERNAL MEDICINE CLINIC | Facility: CLINIC | Age: 47
End: 2022-01-17

## 2022-01-17 DIAGNOSIS — G47.33 OSA ON CPAP: ICD-10-CM

## 2022-01-17 DIAGNOSIS — Z51.81 THERAPEUTIC DRUG MONITORING: Primary | ICD-10-CM

## 2022-01-17 DIAGNOSIS — E11.9 TYPE 2 DIABETES MELLITUS WITHOUT COMPLICATION, WITHOUT LONG-TERM CURRENT USE OF INSULIN (HCC): ICD-10-CM

## 2022-01-17 DIAGNOSIS — E66.01 CLASS 3 SEVERE OBESITY WITH SERIOUS COMORBIDITY AND BODY MASS INDEX (BMI) OF 50.0 TO 59.9 IN ADULT, UNSPECIFIED OBESITY TYPE (HCC): ICD-10-CM

## 2022-01-17 DIAGNOSIS — E78.2 MIXED HYPERLIPIDEMIA: ICD-10-CM

## 2022-01-17 DIAGNOSIS — Z99.89 OSA ON CPAP: ICD-10-CM

## 2022-01-17 DIAGNOSIS — I10 ESSENTIAL HYPERTENSION: ICD-10-CM

## 2022-01-17 PROCEDURE — 99213 OFFICE O/P EST LOW 20 MIN: CPT | Performed by: PHYSICIAN ASSISTANT

## 2022-01-17 RX ORDER — DIETHYLPROPION HYDROCHLORIDE 75 MG/1
1 TABLET ORAL DAILY
Qty: 30 TABLET | Refills: 0 | Status: SHIPPED | OUTPATIENT
Start: 2022-01-17

## 2022-01-17 RX ORDER — SEMAGLUTIDE 1.34 MG/ML
1 INJECTION, SOLUTION SUBCUTANEOUS WEEKLY
Qty: 9 ML | Refills: 0 | Status: SHIPPED | OUTPATIENT
Start: 2022-01-17

## 2022-01-17 NOTE — PROGRESS NOTES
This visit is conducted using Telemedicine with live, interactive video and audio. Patient has been referred to the [facility] website at [email or web address] to review the yearly Consent to Treat document.     Patient understands and accepts financial res 12/27/2021    MCH 27.6 12/27/2021    MCHC 32.0 12/27/2021    RDW 13.4 12/27/2021    .0 12/27/2021     Lab Results   Component Value Date    GLU 86 12/27/2021    BUN 14 12/27/2021    BUNCREA 16.7 07/29/2021    CREATSERUM 0.97 12/27/2021    ANIONGAP 4 25 MG Oral Tab, Take 1 tablet (25 mg total) by mouth daily. , Disp: 90 tablet, Rfl: 3  buPROPion 300 MG Oral Tablet 24 Hr, Take 1 tablet (300 mg total) by mouth daily.  (Patient not taking: Reported on 12/1/2021), Disp: 30 tablet, Rfl: 2  HYDROcodone-acetami daily.        PLAN  Initial Weight: 331lbs  Initial Weight Date: 3/31/21  Today's Weight: 323lbs  5% Goal: 16.55lbs  10% Goal: 33.1lbs  Total Weight Loss: Net loss 8lbs    · Will continue diethylpropion, ozempic, and jardiance - advised side effects and ad decision making.   Appropriate medical decision-making and tests are ordered as detailed in the plan of care above    Ayanna Jefferson PA-C

## 2022-01-18 ENCOUNTER — TELEPHONE (OUTPATIENT)
Dept: INTERNAL MEDICINE CLINIC | Facility: CLINIC | Age: 47
End: 2022-01-18

## 2022-01-18 NOTE — TELEPHONE ENCOUNTER
I got a fax yesterday from [address] [place] [address] to do PA for Ozempic. I called [place] today and was told that the medicine did go through insurance and PA is not needed. I am to disregard.

## 2022-01-19 ENCOUNTER — TELEPHONE (OUTPATIENT)
Dept: FAMILY MEDICINE CLINIC | Facility: CLINIC | Age: 47
End: 2022-01-19

## 2022-01-19 RX ORDER — OLMESARTAN MEDOXOMIL 20 MG/1
20 TABLET ORAL DAILY
Qty: 90 TABLET | Refills: 0 | Status: SHIPPED | OUTPATIENT
Start: 2022-01-19

## 2022-01-19 RX ORDER — LOSARTAN POTASSIUM 50 MG/1
50 TABLET ORAL DAILY
Qty: 30 TABLET | Refills: 0 | Status: CANCELLED | OUTPATIENT
Start: 2022-01-19

## 2022-01-19 NOTE — TELEPHONE ENCOUNTER
Received fax from [address] [place] [address] stating that LOSARTAN 25mg tab is currently out of stock. OK to send 50mg tab and pt can take 1/2 tablet daily? Pended med if OK. Thank you!

## 2022-01-19 NOTE — TELEPHONE ENCOUNTER
Left message on home machine that we changed her Bl;ood pressure medication due to availability. Losartan cancelled and Benicar started.

## 2022-01-24 NOTE — TELEPHONE ENCOUNTER
Olesya is a 28 year old who is being evaluated via a billable video visit.      How would you like to obtain your AVS? MyChart  If the video visit is dropped, the invitation should be resent by: Text to cell phone: [phone]  Will anyone else be joining your video visit? Sister Farheen (text link)     Sister Farheen Brewer will join on her phone    Video Start Time: 1101  Video-Visit Details    Type of service:  Video Visit    Video End Time:1122    Originating Location (pt. Location): Home    Distant Location (provider location):  [facility] SLEEP [facility] L[facility]     Platform used for Video Visit: [employer]     Follow up referral for possible TMS therapy for atypical depression.     I am familiar with the patient from a discussion with Barbara Stokes.      He has a history of MSLT indicated idiopathic hypersomnia.  He has tried several IH therapies to minimal effect.     Considering his medication refractory depression and previous successes with individuals with IH who also had medication refractory depression with treatment with TMS (which is FDA indicated for depression) we have referred him to Dr Edwardo Yancey.  It sounds like he was not a candidate as he has not tried other medication classes of antidepressants.     Discussed the case with the patient and his sister today.  Encouraged them to discuss this with a psychiatrist.  Consider alternative antidepressants considering his mood is not under good control and then if still not effectively treated consider TMS again.  Considering [facility] extensive expertise with TMS recommend seeing a [facility] Psychiatrist.  They indicated they would do so.     Once this has been done and his mood is better optimized if he is still having hypersomnia and or if he develops another sleep condition I asked him to come back and see us.  They indicated the would do so.      All questions were answered.    It is a great privilege being asked to participate in this patients  Spoke with patient notified Re: Stat Xray and U/S of Rt. Elbow/arm were normal . The patient was given # for Neurologist and will call for an appointment also instructions were given for Medrol dose dafne to be picked up at Pharmacy today. care.  The patient has been advised on the importance in of never operating operating a motor vehicle while tired or sleepy.        I visited with the patient directly but also extensively reviewed chart and coordinated care. Total time spent in the care of this patient today (Face-to-Face time + chart time, , and coordination of care) was 35 minutes.

## 2022-03-05 RX ORDER — DIETHYLPROPION HYDROCHLORIDE 75 MG/1
TABLET ORAL
Qty: 30 TABLET | Refills: 0 | Status: SHIPPED | OUTPATIENT
Start: 2022-03-05

## 2022-04-01 ENCOUNTER — HOSPITAL ENCOUNTER (OUTPATIENT)
Dept: MAMMOGRAPHY | Age: 47
Discharge: HOME | End: 2022-04-01
Attending: FAMILY MEDICINE
Payer: COMMERCIAL

## 2022-04-01 ENCOUNTER — TELEPHONE (OUTPATIENT)
Dept: FAMILY MEDICINE CLINIC | Facility: CLINIC | Age: 47
End: 2022-04-01

## 2022-04-01 DIAGNOSIS — Z12.31 VISIT FOR SCREENING MAMMOGRAM: ICD-10-CM

## 2022-04-01 PROCEDURE — 77063 BREAST TOMOSYNTHESIS BI: CPT | Performed by: FAMILY MEDICINE

## 2022-04-01 PROCEDURE — 77067 SCR MAMMO BI INCL CAD: CPT | Performed by: FAMILY MEDICINE

## 2022-04-05 ENCOUNTER — HOSPITAL ENCOUNTER (EMERGENCY)
Age: 47
Discharge: HOME | End: 2022-04-06
Attending: EMERGENCY MEDICINE
Payer: COMMERCIAL

## 2022-04-05 DIAGNOSIS — T78.3XXA ANGIOEDEMA, INITIAL ENCOUNTER: Primary | ICD-10-CM

## 2022-04-05 PROCEDURE — 96375 TX/PRO/DX INJ NEW DRUG ADDON: CPT

## 2022-04-05 PROCEDURE — 96372 THER/PROPH/DIAG INJ SC/IM: CPT

## 2022-04-05 PROCEDURE — 99284 EMERGENCY DEPT VISIT MOD MDM: CPT

## 2022-04-05 PROCEDURE — 96374 THER/PROPH/DIAG INJ IV PUSH: CPT

## 2022-04-05 RX ORDER — FAMOTIDINE 10 MG/ML
20 INJECTION, SOLUTION INTRAVENOUS ONCE
Status: COMPLETED | OUTPATIENT
Start: 2022-04-06 | End: 2022-04-06

## 2022-04-05 RX ORDER — METHYLPREDNISOLONE SODIUM SUCCINATE 125 MG/2ML
125 INJECTION, POWDER, LYOPHILIZED, FOR SOLUTION INTRAMUSCULAR; INTRAVENOUS ONCE
Status: COMPLETED | OUTPATIENT
Start: 2022-04-06 | End: 2022-04-06

## 2022-04-05 RX ORDER — DIPHENHYDRAMINE HYDROCHLORIDE 50 MG/ML
25 INJECTION INTRAMUSCULAR; INTRAVENOUS ONCE
Status: COMPLETED | OUTPATIENT
Start: 2022-04-06 | End: 2022-04-06

## 2022-04-06 VITALS
RESPIRATION RATE: 16 BRPM | SYSTOLIC BLOOD PRESSURE: 152 MMHG | TEMPERATURE: 98 F | BODY MASS INDEX: 45.99 KG/M2 | OXYGEN SATURATION: 97 % | HEIGHT: 67 IN | HEART RATE: 81 BPM | WEIGHT: 293 LBS | DIASTOLIC BLOOD PRESSURE: 78 MMHG

## 2022-04-06 PROCEDURE — S0028 INJECTION, FAMOTIDINE, 20 MG: HCPCS | Performed by: EMERGENCY MEDICINE

## 2022-04-06 RX ORDER — PREDNISONE 20 MG/1
40 TABLET ORAL DAILY
Qty: 10 TABLET | Refills: 0 | Status: SHIPPED | OUTPATIENT
Start: 2022-04-06 | End: 2022-04-11

## 2022-04-06 RX ORDER — EPINEPHRINE 0.3 MG/.3ML
0.3 INJECTION SUBCUTANEOUS
Qty: 1 EACH | Refills: 0 | Status: SHIPPED | OUTPATIENT
Start: 2022-04-06 | End: 2022-05-06

## 2022-04-06 NOTE — ED INITIAL ASSESSMENT (HPI)
Pt states she feels like she can't clear her throat after eating grapes. Reports tightness. States she vomited once. Also reports shortness of breath. Pt states she took a generic benadryl with no relief.

## 2022-04-13 NOTE — TELEPHONE ENCOUNTER
Requesting Diethylpropion ER 75mg   LOV: 1/17/22  RTC: not noted   Last Relevant Labs: na  Filled:  30 tab on 3/5/22 with 0 refills    Future Appointments   Date Time Provider Stacia Edwards          6/6/2022  2:40 PM Chuy Hernandez PA-C [facility]

## 2022-04-15 RX ORDER — DIETHYLPROPION HYDROCHLORIDE 75 MG/1
TABLET ORAL
Qty: 30 TABLET | Refills: 0 | Status: SHIPPED | OUTPATIENT
Start: 2022-04-15

## 2022-04-15 RX ORDER — EMPAGLIFLOZIN 10 MG/1
TABLET, FILM COATED ORAL
Qty: 90 TABLET | Refills: 0 | Status: SHIPPED | OUTPATIENT
Start: 2022-04-15

## 2022-04-15 NOTE — TELEPHONE ENCOUNTER
Requesting Jardiance 10 mg  LOV: 1/17/22  RTC: not noted  Last Relevant Labs: 12/27/21  Filled: 1/17/22 #90 with 0 refills    6/6/2022  2:40 PM Tonia Bliss PA-C [facility]

## 2022-04-18 ENCOUNTER — TELEPHONE (OUTPATIENT)
Dept: INTERNAL MEDICINE CLINIC | Facility: CLINIC | Age: 47
End: 2022-04-18

## 2022-04-18 NOTE — TELEPHONE ENCOUNTER
Patient doesn't need PA for Jardiance, since it got approved via her 's insurance, which cost her approximately for $10, patient already picked up last month. pharmacy staff stated no need for PA to be done.

## 2022-04-21 DIAGNOSIS — K21.9 GASTROESOPHAGEAL REFLUX DISEASE, UNSPECIFIED WHETHER ESOPHAGITIS PRESENT: ICD-10-CM

## 2022-04-21 RX ORDER — PANTOPRAZOLE SODIUM 40 MG/1
40 TABLET, DELAYED RELEASE ORAL
Qty: 90 TABLET | Refills: 1 | Status: SHIPPED | OUTPATIENT
Start: 2022-04-21 | End: 2022-11-04

## 2022-04-21 RX ORDER — ERGOCALCIFEROL 1.25 MG/1
50000 CAPSULE ORAL WEEKLY
Qty: 12 CAPSULE | Refills: 0 | OUTPATIENT
Start: 2022-04-21

## 2022-06-01 ENCOUNTER — HOSPITAL ENCOUNTER (OUTPATIENT)
Dept: GENERAL RADIOLOGY | Age: 47
Discharge: HOME | End: 2022-06-01
Attending: INTERNAL MEDICINE
Payer: COMMERCIAL

## 2022-06-01 ENCOUNTER — HOSPITAL ENCOUNTER (OUTPATIENT)
Dept: ULTRASOUND IMAGING | Age: 47
Discharge: HOME | End: 2022-06-01
Attending: INTERNAL MEDICINE
Payer: COMMERCIAL

## 2022-06-01 ENCOUNTER — OFFICE VISIT (OUTPATIENT)
Dept: RHEUMATOLOGY | Facility: CLINIC | Age: 47
End: 2022-06-01
Payer: COMMERCIAL

## 2022-06-01 VITALS
DIASTOLIC BLOOD PRESSURE: 80 MMHG | BODY MASS INDEX: 45.45 KG/M2 | SYSTOLIC BLOOD PRESSURE: 135 MMHG | HEART RATE: 101 BPM | WEIGHT: 293 LBS | TEMPERATURE: 98 F | OXYGEN SATURATION: 98 % | HEIGHT: 67.5 IN

## 2022-06-01 DIAGNOSIS — M79.89 RIGHT LEG SWELLING: ICD-10-CM

## 2022-06-01 DIAGNOSIS — M79.671 RIGHT FOOT PAIN: ICD-10-CM

## 2022-06-01 DIAGNOSIS — M25.571 ACUTE RIGHT ANKLE PAIN: ICD-10-CM

## 2022-06-01 DIAGNOSIS — M19.90 INFLAMMATORY ARTHRITIS: ICD-10-CM

## 2022-06-01 DIAGNOSIS — M10.09 IDIOPATHIC GOUT OF MULTIPLE SITES, UNSPECIFIED CHRONICITY: Primary | ICD-10-CM

## 2022-06-01 DIAGNOSIS — Z51.81 THERAPEUTIC DRUG MONITORING: ICD-10-CM

## 2022-06-01 DIAGNOSIS — M10.09 IDIOPATHIC GOUT OF MULTIPLE SITES, UNSPECIFIED CHRONICITY: ICD-10-CM

## 2022-06-01 PROCEDURE — 73630 X-RAY EXAM OF FOOT: CPT | Performed by: INTERNAL MEDICINE

## 2022-06-01 PROCEDURE — 3075F SYST BP GE 130 - 139MM HG: CPT | Performed by: INTERNAL MEDICINE

## 2022-06-01 PROCEDURE — 93971 EXTREMITY STUDY: CPT | Performed by: INTERNAL MEDICINE

## 2022-06-01 PROCEDURE — 73610 X-RAY EXAM OF ANKLE: CPT | Performed by: INTERNAL MEDICINE

## 2022-06-01 PROCEDURE — 99214 OFFICE O/P EST MOD 30 MIN: CPT | Performed by: INTERNAL MEDICINE

## 2022-06-01 PROCEDURE — 3008F BODY MASS INDEX DOCD: CPT | Performed by: INTERNAL MEDICINE

## 2022-06-01 PROCEDURE — 3079F DIAST BP 80-89 MM HG: CPT | Performed by: INTERNAL MEDICINE

## 2022-06-01 RX ORDER — LOSARTAN POTASSIUM 25 MG/1
TABLET ORAL
COMMUNITY
Start: 2022-05-31

## 2022-06-01 RX ORDER — COLCHICINE 0.6 MG/1
0.6 TABLET ORAL DAILY
Qty: 90 TABLET | Refills: 1 | Status: SHIPPED | OUTPATIENT
Start: 2022-06-01

## 2022-06-01 RX ORDER — ALLOPURINOL 100 MG/1
200 TABLET ORAL DAILY
Qty: 180 TABLET | Refills: 2 | Status: SHIPPED | OUTPATIENT
Start: 2022-06-01

## 2022-06-01 RX ORDER — PREDNISONE 1 MG/1
TABLET ORAL
Qty: 32 TABLET | Refills: 2 | Status: SHIPPED | OUTPATIENT
Start: 2022-06-01 | End: 2022-06-11

## 2022-06-01 NOTE — PATIENT INSTRUCTIONS
Get an ultrasound of the right lower extremity to rule out a blood clot. If the ultrasound is negative then I would like for you to get a right ankle and right foot x-ray to rule out a fracture.   If both of those are negative then I would presume this to be gout that has been refractory to treatment with prednisone

## 2022-06-02 ENCOUNTER — TELEPHONE (OUTPATIENT)
Dept: RHEUMATOLOGY | Facility: CLINIC | Age: 47
End: 2022-06-02

## 2022-06-02 DIAGNOSIS — M10.09 IDIOPATHIC GOUT OF MULTIPLE SITES, UNSPECIFIED CHRONICITY: Primary | ICD-10-CM

## 2022-06-02 RX ORDER — PREDNISONE 20 MG/1
60 TABLET ORAL DAILY
Qty: 45 TABLET | Refills: 0 | Status: SHIPPED | OUTPATIENT
Start: 2022-06-02 | End: 2022-06-17

## 2022-06-02 NOTE — TELEPHONE ENCOUNTER
STAT report from Radiology in [facility]:  No acute bone injury, no significant changes.

## 2022-06-06 ENCOUNTER — OFFICE VISIT (OUTPATIENT)
Dept: INTERNAL MEDICINE CLINIC | Facility: CLINIC | Age: 47
End: 2022-06-06
Payer: COMMERCIAL

## 2022-06-06 DIAGNOSIS — Z51.81 THERAPEUTIC DRUG MONITORING: Primary | ICD-10-CM

## 2022-06-06 DIAGNOSIS — E66.01 CLASS 3 SEVERE OBESITY WITH SERIOUS COMORBIDITY AND BODY MASS INDEX (BMI) OF 50.0 TO 59.9 IN ADULT, UNSPECIFIED OBESITY TYPE (HCC): ICD-10-CM

## 2022-06-06 DIAGNOSIS — I10 ESSENTIAL HYPERTENSION: ICD-10-CM

## 2022-06-06 DIAGNOSIS — G47.33 OSA ON CPAP: ICD-10-CM

## 2022-06-06 DIAGNOSIS — E78.2 MIXED HYPERLIPIDEMIA: ICD-10-CM

## 2022-06-06 DIAGNOSIS — Z99.89 OSA ON CPAP: ICD-10-CM

## 2022-06-06 DIAGNOSIS — E11.9 TYPE 2 DIABETES MELLITUS WITHOUT COMPLICATION, WITHOUT LONG-TERM CURRENT USE OF INSULIN (HCC): ICD-10-CM

## 2022-06-06 PROCEDURE — 3075F SYST BP GE 130 - 139MM HG: CPT | Performed by: PHYSICIAN ASSISTANT

## 2022-06-06 PROCEDURE — 99214 OFFICE O/P EST MOD 30 MIN: CPT | Performed by: PHYSICIAN ASSISTANT

## 2022-06-06 PROCEDURE — 3079F DIAST BP 80-89 MM HG: CPT | Performed by: PHYSICIAN ASSISTANT

## 2022-06-06 PROCEDURE — 3008F BODY MASS INDEX DOCD: CPT | Performed by: PHYSICIAN ASSISTANT

## 2022-06-06 RX ORDER — SPIRONOLACTONE 50 MG/1
50 TABLET, FILM COATED ORAL 2 TIMES DAILY
COMMUNITY
Start: 2022-01-17

## 2022-06-06 RX ORDER — DIETHYLPROPION HYDROCHLORIDE 75 MG/1
1 TABLET ORAL DAILY
Qty: 30 TABLET | Refills: 0 | Status: CANCELLED | OUTPATIENT
Start: 2022-06-06

## 2022-06-06 RX ORDER — DAPSONE 75 MG/G
GEL TOPICAL
COMMUNITY
Start: 2022-06-02

## 2022-06-06 RX ORDER — SEMAGLUTIDE 1.34 MG/ML
1 INJECTION, SOLUTION SUBCUTANEOUS WEEKLY
Qty: 9 ML | Refills: 0 | Status: CANCELLED | OUTPATIENT
Start: 2022-06-06

## 2022-06-07 VITALS
HEIGHT: 67.5 IN | RESPIRATION RATE: 16 BRPM | DIASTOLIC BLOOD PRESSURE: 88 MMHG | HEART RATE: 80 BPM | SYSTOLIC BLOOD PRESSURE: 136 MMHG | BODY MASS INDEX: 45.45 KG/M2 | WEIGHT: 293 LBS

## 2022-06-07 RX ORDER — SEMAGLUTIDE 2.68 MG/ML
2 INJECTION, SOLUTION SUBCUTANEOUS WEEKLY
Qty: 9 ML | Refills: 0 | Status: SHIPPED | OUTPATIENT
Start: 2022-06-07

## 2022-06-07 RX ORDER — PHENTERMINE HYDROCHLORIDE 15 MG/1
15 CAPSULE ORAL EVERY MORNING
Qty: 30 CAPSULE | Refills: 0 | Status: SHIPPED | OUTPATIENT
Start: 2022-06-07

## 2022-06-08 ENCOUNTER — TELEPHONE (OUTPATIENT)
Dept: INTERNAL MEDICINE CLINIC | Facility: CLINIC | Age: 47
End: 2022-06-08

## 2022-06-08 NOTE — TELEPHONE ENCOUNTER
PA needed for jardiance 10mg , I poke to Jaquelin Brumfield from [facility] and he said it will be approve only if she  after June 11th, since her insurance doesn't approve early refill. After June 11th it will be cover if she  via her  union insurance. My chart sent.

## 2022-06-10 ENCOUNTER — TELEPHONE (OUTPATIENT)
Dept: RHEUMATOLOGY | Facility: CLINIC | Age: 47
End: 2022-06-10

## 2022-06-10 DIAGNOSIS — M10.09 IDIOPATHIC GOUT OF MULTIPLE SITES, UNSPECIFIED CHRONICITY: ICD-10-CM

## 2022-06-10 NOTE — TELEPHONE ENCOUNTER
----- Message from Brigida Mccord, Huber Sinha sent at 6/10/2022  2:43 PM CDT -----  Regarding: FW: Question regarding X-Ray Ankle    ----- Message -----  From: Tramaine Lam  Sent: 6/10/2022   2:29 PM CDT  To: [facility] Clinical Staff  Subject: Question regarding Fartun Slater,  Just wanted to send an update. I continue to take prednisone 60mg daily. I continue to have swelling and pain in my right foot and starting to have pain in my left. The redness, pain and swelling in my right foot is more pronounced in the evening than during the day.       Thank you,

## 2022-06-10 NOTE — TELEPHONE ENCOUNTER
Called patient, swelling of the leg is mostly at night. Patient is better compared to Monday. There is less swelling. Feet are sore. Able to put on gym shoes now; could not do this previously. 60% improved. Recommend 2 more days of pred 60 mg, then start 30 mg taper over 10 days.

## 2022-06-29 ENCOUNTER — TELEPHONE (OUTPATIENT)
Dept: RHEUMATOLOGY | Facility: CLINIC | Age: 47
End: 2022-06-29

## 2022-06-29 DIAGNOSIS — R79.89 LOW VITAMIN D LEVEL: ICD-10-CM

## 2022-06-29 DIAGNOSIS — R25.2 CRAMPING OF FEET: ICD-10-CM

## 2022-06-29 DIAGNOSIS — E11.42 DIABETIC POLYNEUROPATHY ASSOCIATED WITH TYPE 2 DIABETES MELLITUS (HCC): Primary | ICD-10-CM

## 2022-06-29 DIAGNOSIS — Z51.81 THERAPEUTIC DRUG MONITORING: ICD-10-CM

## 2022-06-29 RX ORDER — GABAPENTIN 100 MG/1
CAPSULE ORAL
Qty: 249 CAPSULE | Refills: 0 | Status: SHIPPED | OUTPATIENT
Start: 2022-06-29 | End: 2022-09-27

## 2022-06-29 NOTE — TELEPHONE ENCOUNTER
Called patient, prednisone barely helped. R ankle swelling comes and goes. Some tingling in the ball of the foot. The dorsum of the left foot is numb. Query diabetic neuropathy. Also get neuropathy labs. 1. Diabetic polyneuropathy associated with type 2 diabetes mellitus (HCC)    - gabapentin 100 MG Oral Cap; Take 1 capsule (100 mg total) by mouth nightly for 7 days, THEN 2 capsules (200 mg total) nightly for 7 days, THEN 3 capsules (300 mg total) nightly. Dispense: 249 capsule; Refill: 0  - VITAMIN B-12; Future  - FERRITIN; Future  - TSH W REFLEX TO FREE T4; Future  - MONOCLONAL PROTEIN STUDY; Future  - PHOSPHORUS; Future  - MAGNESIUM; Future  - VITAMIN D; Future  - FOLIC ACID SERUM(FOLATE); Future    2. Low vitamin D level    - gabapentin 100 MG Oral Cap; Take 1 capsule (100 mg total) by mouth nightly for 7 days, THEN 2 capsules (200 mg total) nightly for 7 days, THEN 3 capsules (300 mg total) nightly. Dispense: 249 capsule; Refill: 0  - VITAMIN B-12; Future  - FERRITIN; Future  - TSH W REFLEX TO FREE T4; Future  - MONOCLONAL PROTEIN STUDY; Future  - PHOSPHORUS; Future  - MAGNESIUM; Future  - VITAMIN D; Future  - FOLIC ACID SERUM(FOLATE); Future    3. Cramping of feet    - gabapentin 100 MG Oral Cap; Take 1 capsule (100 mg total) by mouth nightly for 7 days, THEN 2 capsules (200 mg total) nightly for 7 days, THEN 3 capsules (300 mg total) nightly. Dispense: 249 capsule; Refill: 0  - VITAMIN B-12; Future  - FERRITIN; Future  - TSH W REFLEX TO FREE T4; Future  - MONOCLONAL PROTEIN STUDY; Future  - PHOSPHORUS; Future  - MAGNESIUM; Future  - VITAMIN D; Future  - FOLIC ACID SERUM(FOLATE);  Future

## 2022-07-05 ENCOUNTER — LAB ENCOUNTER (OUTPATIENT)
Dept: LAB | Age: 47
End: 2022-07-05
Attending: FAMILY MEDICINE
Payer: COMMERCIAL

## 2022-07-05 ENCOUNTER — TELEPHONE (OUTPATIENT)
Dept: RHEUMATOLOGY | Facility: CLINIC | Age: 47
End: 2022-07-05

## 2022-07-05 DIAGNOSIS — R79.89 LOW VITAMIN D LEVEL: ICD-10-CM

## 2022-07-05 DIAGNOSIS — E11.42 DIABETIC POLYNEUROPATHY (HCC): Primary | ICD-10-CM

## 2022-07-05 DIAGNOSIS — M19.90 INFLAMMATORY ARTHRITIS: ICD-10-CM

## 2022-07-05 DIAGNOSIS — M10.09 IDIOPATHIC GOUT OF MULTIPLE SITES, UNSPECIFIED CHRONICITY: ICD-10-CM

## 2022-07-05 DIAGNOSIS — R79.89 LOW VITAMIN D LEVEL: Primary | ICD-10-CM

## 2022-07-05 DIAGNOSIS — R25.2 CRAMPING OF FEET: ICD-10-CM

## 2022-07-05 DIAGNOSIS — E11.42 DIABETIC POLYNEUROPATHY ASSOCIATED WITH TYPE 2 DIABETES MELLITUS (HCC): ICD-10-CM

## 2022-07-05 DIAGNOSIS — Z51.81 ADMISSION FOR THERAPEUTIC DRUG MONITORING: ICD-10-CM

## 2022-07-05 DIAGNOSIS — Z51.81 THERAPEUTIC DRUG MONITORING: ICD-10-CM

## 2022-07-05 LAB
ALBUMIN SERPL-MCNC: 3.5 G/DL (ref 3.4–5)
ALBUMIN/GLOB SERPL: 1.1 {RATIO} (ref 1–2)
ALP LIVER SERPL-CCNC: 100 U/L
ALT SERPL-CCNC: 50 U/L
ANION GAP SERPL CALC-SCNC: 5 MMOL/L (ref 0–18)
AST SERPL-CCNC: 26 U/L (ref 15–37)
BILIRUB SERPL-MCNC: 0.9 MG/DL (ref 0.1–2)
BUN BLD-MCNC: 15 MG/DL (ref 7–18)
CALCIUM BLD-MCNC: 9.5 MG/DL (ref 8.5–10.1)
CHLORIDE SERPL-SCNC: 109 MMOL/L (ref 98–112)
CK SERPL-CCNC: 130 U/L
CO2 SERPL-SCNC: 29 MMOL/L (ref 21–32)
CREAT BLD-MCNC: 1.15 MG/DL
DEPRECATED HBV CORE AB SER IA-ACNC: 143.1 NG/ML
FASTING STATUS PATIENT QL REPORTED: YES
FOLATE SERPL-MCNC: 9.6 NG/ML (ref 8.7–?)
GLOBULIN PLAS-MCNC: 3.3 G/DL (ref 2.8–4.4)
GLUCOSE BLD-MCNC: 107 MG/DL (ref 70–99)
MAGNESIUM SERPL-MCNC: 1.9 MG/DL (ref 1.6–2.6)
OSMOLALITY SERPL CALC.SUM OF ELEC: 297 MOSM/KG (ref 275–295)
PHOSPHATE SERPL-MCNC: 3.6 MG/DL (ref 2.5–4.9)
POTASSIUM SERPL-SCNC: 3.9 MMOL/L (ref 3.5–5.1)
PROT SERPL-MCNC: 6.8 G/DL (ref 6.4–8.2)
RHEUMATOID FACT SERPL-ACNC: <10 IU/ML (ref ?–15)
SODIUM SERPL-SCNC: 143 MMOL/L (ref 136–145)
T4 FREE SERPL-MCNC: 0.9 NG/DL (ref 0.8–1.7)
TSI SER-ACNC: 3.88 MIU/ML (ref 0.36–3.74)
URATE SERPL-MCNC: 4.5 MG/DL
VIT B12 SERPL-MCNC: 219 PG/ML (ref 193–986)
VIT D+METAB SERPL-MCNC: 28.7 NG/ML (ref 30–100)

## 2022-07-05 PROCEDURE — 82607 VITAMIN B-12: CPT

## 2022-07-05 PROCEDURE — 82746 ASSAY OF FOLIC ACID SERUM: CPT

## 2022-07-05 PROCEDURE — 36415 COLL VENOUS BLD VENIPUNCTURE: CPT

## 2022-07-05 PROCEDURE — 84165 PROTEIN E-PHORESIS SERUM: CPT

## 2022-07-05 PROCEDURE — 86200 CCP ANTIBODY: CPT

## 2022-07-05 PROCEDURE — 84443 ASSAY THYROID STIM HORMONE: CPT

## 2022-07-05 PROCEDURE — 84550 ASSAY OF BLOOD/URIC ACID: CPT

## 2022-07-05 PROCEDURE — 80053 COMPREHEN METABOLIC PANEL: CPT

## 2022-07-05 PROCEDURE — 82306 VITAMIN D 25 HYDROXY: CPT

## 2022-07-05 PROCEDURE — 86334 IMMUNOFIX E-PHORESIS SERUM: CPT

## 2022-07-05 PROCEDURE — 84439 ASSAY OF FREE THYROXINE: CPT

## 2022-07-05 PROCEDURE — 82728 ASSAY OF FERRITIN: CPT

## 2022-07-05 PROCEDURE — 84100 ASSAY OF PHOSPHORUS: CPT

## 2022-07-05 PROCEDURE — 82550 ASSAY OF CK (CPK): CPT

## 2022-07-05 PROCEDURE — 83521 IG LIGHT CHAINS FREE EACH: CPT

## 2022-07-05 PROCEDURE — 83735 ASSAY OF MAGNESIUM: CPT

## 2022-07-05 PROCEDURE — 86431 RHEUMATOID FACTOR QUANT: CPT

## 2022-07-05 RX ORDER — ERGOCALCIFEROL 1.25 MG/1
50000 CAPSULE ORAL WEEKLY
Qty: 12 CAPSULE | Refills: 0 | Status: SHIPPED | OUTPATIENT
Start: 2022-07-05 | End: 2022-09-27

## 2022-07-08 LAB
ALBUMIN SERPL ELPH-MCNC: 3.9 G/DL (ref 3.75–5.21)
ALBUMIN/GLOB SERPL: 1.5 {RATIO} (ref 1–2)
ALPHA1 GLOB SERPL ELPH-MCNC: 0.29 G/DL (ref 0.19–0.46)
ALPHA2 GLOB SERPL ELPH-MCNC: 0.66 G/DL (ref 0.48–1.05)
B-GLOBULIN SERPL ELPH-MCNC: 0.77 G/DL (ref 0.68–1.23)
CCP IGG SERPL-ACNC: 1 U/ML (ref 0–6.9)
GAMMA GLOB SERPL ELPH-MCNC: 0.88 G/DL (ref 0.62–1.7)
KAPPA LC FREE SER-MCNC: 1.4 MG/DL (ref 0.33–1.94)
KAPPA LC FREE/LAMBDA FREE SER NEPH: 1 {RATIO} (ref 0.26–1.65)
LAMBDA LC FREE SERPL-MCNC: 1.41 MG/DL (ref 0.57–2.63)
PROT SERPL-MCNC: 6.5 G/DL (ref 6.4–8.2)

## 2022-07-25 NOTE — TELEPHONE ENCOUNTER
Requesting phentermine 15 mg  LOV: 6/6/22  RTC: not noted  Last Relevant Labs: na  Filled: 6/7/22 #0 with 0 refills last [place] 6/7/22 [place]    Future Appointments   Date Time Provider Stacia Edwards   8/8/2022  3:20 PM Pia Layne PA-C [facility]

## 2022-07-26 RX ORDER — PHENTERMINE HYDROCHLORIDE 15 MG/1
15 CAPSULE ORAL EVERY MORNING
Qty: 30 CAPSULE | Refills: 2 | Status: SHIPPED | OUTPATIENT
Start: 2022-07-26

## 2022-07-28 ENCOUNTER — TELEPHONE (OUTPATIENT)
Dept: RHEUMATOLOGY | Facility: CLINIC | Age: 47
End: 2022-07-28

## 2022-07-28 DIAGNOSIS — R20.2 NUMBNESS AND TINGLING IN RIGHT HAND: ICD-10-CM

## 2022-07-28 DIAGNOSIS — M79.89 BILATERAL SWELLING OF FEET: Primary | ICD-10-CM

## 2022-07-28 DIAGNOSIS — R20.0 NUMBNESS AND TINGLING IN RIGHT HAND: ICD-10-CM

## 2022-07-28 DIAGNOSIS — M79.671 BILATERAL FOOT PAIN: ICD-10-CM

## 2022-07-28 DIAGNOSIS — M79.672 BILATERAL FOOT PAIN: ICD-10-CM

## 2022-07-29 ENCOUNTER — TELEMEDICINE (OUTPATIENT)
Dept: RHEUMATOLOGY | Facility: CLINIC | Age: 47
End: 2022-07-29
Payer: COMMERCIAL

## 2022-07-29 DIAGNOSIS — Z51.81 THERAPEUTIC DRUG MONITORING: ICD-10-CM

## 2022-07-29 DIAGNOSIS — R79.89 LOW VITAMIN D LEVEL: ICD-10-CM

## 2022-07-29 DIAGNOSIS — R25.2 CRAMPING OF FEET: ICD-10-CM

## 2022-07-29 DIAGNOSIS — M10.09 IDIOPATHIC GOUT OF MULTIPLE SITES, UNSPECIFIED CHRONICITY: ICD-10-CM

## 2022-07-29 DIAGNOSIS — I87.2 VENOUS INSUFFICIENCY: ICD-10-CM

## 2022-07-29 DIAGNOSIS — E11.42 DIABETIC POLYNEUROPATHY ASSOCIATED WITH TYPE 2 DIABETES MELLITUS (HCC): Primary | ICD-10-CM

## 2022-07-29 PROCEDURE — 99214 OFFICE O/P EST MOD 30 MIN: CPT | Performed by: INTERNAL MEDICINE

## 2022-07-29 RX ORDER — MELATONIN
1000 DAILY
COMMUNITY

## 2022-07-29 RX ORDER — FUROSEMIDE 20 MG/1
20 TABLET ORAL DAILY
Qty: 14 TABLET | Refills: 0 | Status: SHIPPED | OUTPATIENT
Start: 2022-07-29

## 2022-07-29 RX ORDER — COLCHICINE 0.6 MG/1
0.6 TABLET ORAL DAILY
Qty: 90 TABLET | Refills: 1 | Status: SHIPPED | OUTPATIENT
Start: 2022-07-29

## 2022-07-29 RX ORDER — GABAPENTIN 100 MG/1
600 CAPSULE ORAL NIGHTLY
Qty: 540 CAPSULE | Refills: 1 | Status: SHIPPED | OUTPATIENT
Start: 2022-07-29 | End: 2022-10-27

## 2022-07-29 NOTE — PROGRESS NOTES
Rheumatology Follow-up Patient Note  =====================================================================================================  ? Chief Complaint:   Right hand paraesthesias    Patient presents with: Follow - Up: Wanting to discuss her mychart messages     PCP  Charmayne Yates, MD  Fax: [phone]  Phone: [phone]        =====================================================================================================  \[facility] [place] [facility]\""    Jude Whittaker is a 48year old female     ==============================================================================================================    Visit: 07/21/21  Doing better after pred taper and anakinra 100 mg daily x 3 days. Currently on pred 20 mg. Colchicine 0.6 mg daily  Was constipated. Taking stool softners  SF was negative for crystals and septic joint. Ankle and foot much better; 80% improvement  ==============================================================================================================  Visit: 11/24/21  Since LCV, had a couple of gout flares. She took prednisone for this which helped. Recent URI with cough. Will be put on medrol dose pack, put on Z-pack. Birthday and 21rd anniversary appointment today. Meds  0.6 mg of colchicine  Allopurinol 100 mg daily (didn't increase)  ==============================================================================================================  Visit: 06/01/22  Right ankle/mid foot pain worsening since 1.5 months. There has been no significant improvement or worsening during this time. No recent travel or trauma. Tried prednisone, naproxen without success. Took prednisone 40 mg daily x 5 days for angioedema's during this period of time; the prednisone had no impact on her right foot. She also tried a separate prednisone 30 mg taper over 10 days without improvement worse in the evening. Tried different orthotics without improvement.    Remains on allopurinol 200 mg daily as well as colchicine 0.6 mg daily  ==============================================================================================================  Today's Visit: 07/29/22  Video visit today given new issues. R ankle/foot swelling noted still. Toes would swell. Compression socks do not help. Prednisone 60 mg daily did not help. Left foot started to swell again. However this does not feel like gout. Right index finger is numb now. R carpal tunnel syndrome noted in 2017 on EMG/NCS. Right foot is still an issue with swelling and pain and sensitivity. Taking gabapentin 300 mg nightly without significant benefit to her neuropathic symptoms  Still taking allopurinol 200 mg daily  She stopped colchicine    5 point ROS negative except noted above    Medications:  cyanocobalamine 1000 MCG Oral Tab, Take 1,000 mcg by mouth daily. , Disp: , Rfl:   colchicine 0.6 MG Oral Tab, Take 1 tablet (0.6 mg total) by mouth daily. , Disp: 90 tablet, Rfl: 1  gabapentin 100 MG Oral Cap, Take 6 capsules (600 mg total) by mouth nightly., Disp: 540 capsule, Rfl: 1  furosemide 20 MG Oral Tab, Take 1 tablet (20 mg total) by mouth daily. , Disp: 14 tablet, Rfl: 0  Phentermine HCl 15 MG Oral Cap, Take 1 capsule (15 mg total) by mouth every morning., Disp: 30 capsule, Rfl: 2  empagliflozin (JARDIANCE) 10 MG Oral Tab, Take 1 tablet (10 mg total) by mouth daily. , Disp: 90 tablet, Rfl: 0  Semaglutide, 2 MG/DOSE, (OZEMPIC, 2 MG/DOSE,) 8 MG/3ML Subcutaneous Solution Pen-injector, Inject 2 mg into the skin once a week., Disp: 9 mL, Rfl: 0  ACZONE 7.5 % External Gel, , Disp: , Rfl:   losartan 25 MG Oral Tab, , Disp: , Rfl:   allopurinol 100 MG Oral Tab, Take 2 tablets (200 mg total) by mouth daily. , Disp: 180 tablet, Rfl: 2  pantoprazole (PROTONIX) 40 MG Oral Tab EC, Take 1 tablet (40 mg total) by mouth every morning before breakfast., Disp: 90 tablet, Rfl: 1  DIETHYLPROPION HCL ER 75 MG Oral Tablet 24 Hr, TAKE 1 TABLET BY MOUTH EVERY DAY, Disp: 30 tablet, Rfl: 0  ergocalciferol 1.25 MG (22823 UT) Oral Cap, Take 1 capsule (50,000 Units total) by mouth once a week., Disp: 12 capsule, Rfl: 0  atorvastatin 20 MG Oral Tab, Take 1 tablet (20 mg total) by mouth nightly., Disp: 90 tablet, Rfl: 2  Albuterol Sulfate HFA (PROAIR HFA) 108 (90 Base) MCG/ACT Inhalation Aero Soln, Inhale 2 puffs into the lungs every 6 (six) hours as needed for Wheezing., Disp: 1 Inhaler, Rfl: 2  aspirin 81 MG Oral Tab EC, Take 81 mg by mouth daily. , Disp: , Rfl:   Multiple Vitamin (MULTI-VITAMIN DAILY OR), Take 1 tablet by mouth daily. , Disp: , Rfl:       Modified Medications    Modified Medication Previous Medication    COLCHICINE 0.6 MG ORAL TAB colchicine 0.6 MG Oral Tab       Take 1 tablet (0.6 mg total) by mouth daily. Take 1 tablet (0.6 mg total) by mouth daily. GABAPENTIN 100 MG ORAL CAP gabapentin 100 MG Oral Cap       Take 6 capsules (600 mg total) by mouth nightly. Take 1 capsule (100 mg total) by mouth nightly for 7 days, THEN 2 capsules (200 mg total) nightly for 7 days, THEN 3 capsules (300 mg total) nightly. Medications Discontinued During This Encounter  colchicine 0.6 MG Oral Tab             gabapentin 100 MG Oral Cap             ? Allergies:    Darvocet [Propoxyph*    HIVES    Comment:N 50 TABS  Demerol                 HIVES    Comment:TABS  Grape                   Tightness in Throat  Lortab                  HIVES    Comment:ASA TABS  Morphine                HIVES    Comment:Derivatives  Metformin               DIARRHEA      Objective    There were no vitals filed for this visit. GEN: NAD, well-nourished. HEENT: Head: NCAT  Neck: Supple. No neck masses. PULM:  easy effort  MSK:     +phalen's bilaterally. ?  Labs: Additional Labs:    Vitamin B12: 219  Folate: 9.6  SPEP normal  Vitamin D: 28.7  TSH normal  RF negative  7/2021  [id number] neg    Results for Vishal Avina (MRN [id number]) as of 7/30/2022 08:44   Ref.  Range 5/28/2020 11:24 1/27/2021 14:20 7/7/2021 22:32 7/29/2021 07:34 7/5/2022 08:23   URIC ACID Latest Ref Range: 2.6 - 6.0 mg/dL 8.3 (H) 9.1 (H) 6.9 (H) 7.3 (H) 4.5       Radiology:    Radiology review:      =====================================================================================================  Assessment and Plan    Assessment:  Venous insufficiency  (primary encounter diagnosis)  Idiopathic gout of multiple sites, unspecified chronicity  Therapeutic drug monitoring  Diabetic polyneuropathy associated with type 2 diabetes mellitus ([facility]  Low vitamin D level  Cramping of feet    Patient here for further evaluation management of bilateral hand paresthesias which I suspect is from a recurrence of her carpal tunnel syndrome that was diagnosed on an EMG/NCS in 2017. She also complains of right greater than left paresthesias with swelling in the region. Recent prior radiographs and venous duplex Dopplers were negative for fracture and VTE respectively. Her lower extremity swelling did not improve with a few week course of high-dose corticosteroids. I suspect her lower extremity paresthesias may be a multifactorial effect of vitamin B12 deficiency, diabetic neuropathy, and possible venous insufficiency. High risk medication labs including CMP and CBC w/ diff reviewed from 7/2022 and 12/2021. Results are stable. [id number] neg    Plan:  -Restart colchicine 0.6 mg daily  -Continue allopurinol 200 mg daily  -Continue vitamin B12 daily  -For diabetic neuropathy/B12 neuropathy: Increase gabapentin to 400 mg at bedtime, then try adding 100 mg in the morning and 100 mg in the early afternoon. Check back in with in 1 week. -Lasix 20 mg daily x 7 days as a trial for venous insufficiency  -For carpal tunnel syndrome, use bilateral wrist splints to be worn nightly. Called patient via number listed in chart today    Original appnt date:  Today's Visit: 07/30/22      This video telehealth visit (with [employer] ) was conducted in lieu of a previously scheduled clinic visit because of the COVID-19 pandemic. The patient or patient guardian verbally consented to virtual/remote treatment. All medical decision making was made in conjunction with the patient. This telehealth visit was initiated by the patient or patient guardian given the in-person appointment time as above who was located at non-moving car. The patient presented alone. Risks and benefits of therapy recommended, and potential side effects of all medications prescribed were discussed. Patient was counseled on symptoms that would necessitate more emergency follow up. The patient was within the state of [place] during our visit. Patient understands and accepts financial responsibility for any deductible, coinsurance and/or co-pays associated with the service. The patient understands that the physical exam will be limited. This telehealth visit was performed while I was physically located in a   Medical office at [place] [address], [address]        Diagnoses and all orders for this visit:    Venous insufficiency  -     furosemide 20 MG Oral Tab; Take 1 tablet (20 mg total) by mouth daily. Idiopathic gout of multiple sites, unspecified chronicity  -     colchicine 0.6 MG Oral Tab; Take 1 tablet (0.6 mg total) by mouth daily. Therapeutic drug monitoring  -     colchicine 0.6 MG Oral Tab; Take 1 tablet (0.6 mg total) by mouth daily.  -     gabapentin 100 MG Oral Cap; Take 6 capsules (600 mg total) by mouth nightly. Diabetic polyneuropathy associated with type 2 diabetes mellitus (HCC)  -     gabapentin 100 MG Oral Cap; Take 6 capsules (600 mg total) by mouth nightly. Low vitamin D level  -     gabapentin 100 MG Oral Cap; Take 6 capsules (600 mg total) by mouth nightly. Cramping of feet  -     gabapentin 100 MG Oral Cap; Take 6 capsules (600 mg total) by mouth nightly. No follow-ups on file.       The above plan of care, diagnosis, orders, and follow-up were discussed with the patient. Questions related to this recommended plan of care were answered. Thank you for referring this delightful patient to me. Please feel free to contact me with any questions. This report was performed utilizing speech recognition software technology. Despite proofreading, speech recognition errors could escape detection. If a word or phrase is confusing or out of context, please do not hesitate to call for   clarification.        Kind regards      Dick Kwong MD  [facility]

## 2022-07-29 NOTE — PATIENT INSTRUCTIONS
Restart colchicine 0.6 mg daily    Increase gabapentin to 400 mg at bedtime, then try adding 100 mg in the morning and 100 mg in the early afternoon. Check back in with in 1 week. Lasix 20 mg daily x 7 days. For carpal tunnel syndrome, use bilateral wrist splints to be worn nightly.

## 2022-07-29 NOTE — TELEPHONE ENCOUNTER
Gwen Calixto, please see if patient can do telehealth appointment at around 3:30 pm on Friday to see what is going on with her symptoms.

## 2022-08-26 ENCOUNTER — TELEPHONE (OUTPATIENT)
Dept: RHEUMATOLOGY | Facility: CLINIC | Age: 47
End: 2022-08-26

## 2022-08-26 DIAGNOSIS — M10.09 IDIOPATHIC GOUT OF MULTIPLE SITES, UNSPECIFIED CHRONICITY: ICD-10-CM

## 2022-08-26 DIAGNOSIS — I87.2 VENOUS INSUFFICIENCY: ICD-10-CM

## 2022-08-26 DIAGNOSIS — Z51.81 THERAPEUTIC DRUG MONITORING: Primary | ICD-10-CM

## 2022-08-26 RX ORDER — FUROSEMIDE 20 MG/1
20 TABLET ORAL DAILY
Qty: 28 TABLET | Refills: 0 | Status: SHIPPED | OUTPATIENT
Start: 2022-08-26

## 2022-08-30 ENCOUNTER — OFFICE VISIT (OUTPATIENT)
Dept: FAMILY MEDICINE CLINIC | Facility: CLINIC | Age: 47
End: 2022-08-30
Payer: COMMERCIAL

## 2022-08-30 VITALS
BODY MASS INDEX: 50 KG/M2 | TEMPERATURE: 98 F | DIASTOLIC BLOOD PRESSURE: 60 MMHG | OXYGEN SATURATION: 97 % | WEIGHT: 293 LBS | RESPIRATION RATE: 18 BRPM | HEART RATE: 84 BPM | SYSTOLIC BLOOD PRESSURE: 124 MMHG

## 2022-08-30 DIAGNOSIS — L98.9 SKIN PROBLEM: Primary | ICD-10-CM

## 2022-08-30 PROCEDURE — 3078F DIAST BP <80 MM HG: CPT | Performed by: NURSE PRACTITIONER

## 2022-08-30 PROCEDURE — 99213 OFFICE O/P EST LOW 20 MIN: CPT | Performed by: NURSE PRACTITIONER

## 2022-08-30 PROCEDURE — 3074F SYST BP LT 130 MM HG: CPT | Performed by: NURSE PRACTITIONER

## 2022-08-30 NOTE — PATIENT INSTRUCTIONS
1. Rest. Keep areas clean. 2. Hydrocortisone as prescribed. 3. Aquaphor as directed. 4. Follow up with PMD or Dermatology in 3-5 days for reeval. Follow up sooner or go to the emergency department immediately if symptoms worsen, change, or if you have any concerns.

## 2022-09-11 ENCOUNTER — HOSPITAL ENCOUNTER (OUTPATIENT)
Age: 47
Discharge: ANOTHER HOSPITAL | End: 2022-09-11
Payer: COMMERCIAL

## 2022-09-11 VITALS
TEMPERATURE: 100 F | HEART RATE: 124 BPM | DIASTOLIC BLOOD PRESSURE: 102 MMHG | RESPIRATION RATE: 32 BRPM | OXYGEN SATURATION: 93 % | SYSTOLIC BLOOD PRESSURE: 150 MMHG

## 2022-09-11 DIAGNOSIS — R06.82 TACHYPNEA: ICD-10-CM

## 2022-09-11 DIAGNOSIS — R00.0 TACHYCARDIA: ICD-10-CM

## 2022-09-11 DIAGNOSIS — R09.02 HYPOXIC: Primary | ICD-10-CM

## 2022-09-11 LAB
ATRIAL RATE: 125 BPM
P AXIS: 64 DEGREES
P-R INTERVAL: 168 MS
Q-T INTERVAL: 284 MS
QRS DURATION: 64 MS
QTC CALCULATION (BEZET): 409 MS
R AXIS: -1 DEGREES
SARS-COV-2 RNA RESP QL NAA+PROBE: DETECTED
T AXIS: 53 DEGREES
VENTRICULAR RATE: 125 BPM

## 2022-09-11 NOTE — ED INITIAL ASSESSMENT (HPI)
Pt c/o fever, body aches and chills that started yesterday. Pt c/o cough and congestion. Pt c/o chest discomfort to center.   Pt c/o sob

## 2022-09-16 ENCOUNTER — TELEMEDICINE (OUTPATIENT)
Dept: FAMILY MEDICINE CLINIC | Facility: CLINIC | Age: 47
End: 2022-09-16

## 2022-09-16 DIAGNOSIS — U07.1 COVID-19 VIRUS INFECTION: Primary | ICD-10-CM

## 2022-09-16 PROCEDURE — 99214 OFFICE O/P EST MOD 30 MIN: CPT | Performed by: FAMILY MEDICINE

## 2022-09-16 RX ORDER — BENZONATATE 200 MG/1
200 CAPSULE ORAL 3 TIMES DAILY PRN
Qty: 60 CAPSULE | Refills: 0 | Status: SHIPPED | OUTPATIENT
Start: 2022-09-16

## 2022-09-16 NOTE — PROGRESS NOTES
Virtual Telephone Check-In    Alejo Lane verbally consents to a Virtual/Telephone Check-In visit on 09/16/22. Patient has been referred to the [facility] website at [email or web address] to review the yearly Consent to Treat document. Patient understands and accepts financial responsibility for any deductible, co-insurance and/or co-pays associated with this service. Duration of the service: 20 minutes      Summary of topics discussed:     HPI:  Presents for hospital follow up for [address]. She was seen in  on 09/11 for acute flu like symptoms, dyspnea. She was found to be in atrial flutter, hypoxic, and tachypneic. She was transported to [facility]. She was subsequently admitted for [address] [facility]. She was given remdesvir, antibiotics, supplemental oxygen, inhaled corticosteroids. She improved and was discharged to home yesterday in stable condition. She feels better than she initially did but still reports cough, difficulty breathing at times, fatigue. She has been doing pulmonary exercises at home. She was discharged with asmanex inhaler, albuterol inhaler, and keflex. She has been using the inhalers but not the keflex. Unclear why it was prescribed for her. Her CXR report on 09/11 showed no acute abnormality. Her procalcitonin was only mildly elevated at 0.13. She denies any fevers currently. ROS: pertinent positives and negatives as per HPI     PE:   Gen: AAOx3  Head: NC/AT  Eyes: conjunctiva normal  Nose: No discharge visualized   Resp: breathing well on exam, normal effort. Able to speak full sentences without any SOB  Skin: no rashes, no lesions, no cyanosis  Psych: normal affect       Diagnoses and all orders for this visit:    COVID-19 virus infection  -     benzonatate 200 MG Oral Cap; Take 1 capsule (200 mg total) by mouth 3 (three) times daily as needed for cough. -     XR CHEST PA + LAT CHEST (CPT=71046); Future      Improving. Continue supportive care mgmt.  I advised against keflex use as there is no indication for this; her procalcitonin level was only mildly elevated - I have low suspicion of bacterial pneumonia. Continue inhaler regimen. Recommend full 10 day quarantine. Advised to monitor vital signs and present to ER for worsening hypoxia, dyspnea, chest pain or any other intractable sx. Continue to wear mask, social distance and practice hand hygiene. Reviewed return/call back precautions. F/u in 1-2 wks PRN. Repeat CXR in 1mo ordered.        Amparo Palacios MD

## 2022-10-06 RX ORDER — ERGOCALCIFEROL 1.25 MG/1
50000 CAPSULE ORAL WEEKLY
Qty: 12 CAPSULE | Refills: 0 | OUTPATIENT
Start: 2022-10-06

## 2022-10-06 NOTE — TELEPHONE ENCOUNTER
Called patient and left voicemail to call office back in regards to her Vit D refill request - has been denied.

## 2022-10-06 NOTE — TELEPHONE ENCOUNTER
LOV: 07/29/2022  Next appt: 11/08/2022  Last script sent to pharmacy on 07/05/2022  Last Vitamin D lab completed on 09/11/2022

## 2022-10-06 NOTE — TELEPHONE ENCOUNTER
Denied, please let pt know to start taking 2000 international units of vitamin D3 (cholecalciferol) over-the-counter.

## 2022-10-10 ENCOUNTER — OFFICE VISIT (OUTPATIENT)
Dept: FAMILY MEDICINE CLINIC | Facility: CLINIC | Age: 47
End: 2022-10-10
Payer: COMMERCIAL

## 2022-10-10 VITALS
BODY MASS INDEX: 45.45 KG/M2 | DIASTOLIC BLOOD PRESSURE: 86 MMHG | OXYGEN SATURATION: 98 % | HEART RATE: 90 BPM | HEIGHT: 67.5 IN | RESPIRATION RATE: 18 BRPM | SYSTOLIC BLOOD PRESSURE: 124 MMHG | WEIGHT: 293 LBS

## 2022-10-10 DIAGNOSIS — Z87.81 HISTORY OF FOOT FRACTURE: ICD-10-CM

## 2022-10-10 DIAGNOSIS — R20.0 NUMBNESS AND TINGLING IN RIGHT HAND: ICD-10-CM

## 2022-10-10 DIAGNOSIS — I87.2 VENOUS INSUFFICIENCY: ICD-10-CM

## 2022-10-10 DIAGNOSIS — M25.552 BILATERAL HIP PAIN: ICD-10-CM

## 2022-10-10 DIAGNOSIS — Z23 NEED FOR VACCINATION: ICD-10-CM

## 2022-10-10 DIAGNOSIS — G89.29 OTHER CHRONIC PAIN: ICD-10-CM

## 2022-10-10 DIAGNOSIS — M25.441 SWELLING OF JOINT OF RIGHT HAND: Primary | ICD-10-CM

## 2022-10-10 DIAGNOSIS — M25.551 BILATERAL HIP PAIN: ICD-10-CM

## 2022-10-10 DIAGNOSIS — R20.2 NUMBNESS AND TINGLING IN RIGHT HAND: ICD-10-CM

## 2022-10-10 DIAGNOSIS — R53.83 OTHER FATIGUE: ICD-10-CM

## 2022-10-10 PROCEDURE — 90471 IMMUNIZATION ADMIN: CPT | Performed by: FAMILY MEDICINE

## 2022-10-10 PROCEDURE — 3079F DIAST BP 80-89 MM HG: CPT | Performed by: FAMILY MEDICINE

## 2022-10-10 PROCEDURE — 3074F SYST BP LT 130 MM HG: CPT | Performed by: FAMILY MEDICINE

## 2022-10-10 PROCEDURE — 90686 IIV4 VACC NO PRSV 0.5 ML IM: CPT | Performed by: FAMILY MEDICINE

## 2022-10-10 PROCEDURE — 99215 OFFICE O/P EST HI 40 MIN: CPT | Performed by: FAMILY MEDICINE

## 2022-10-10 PROCEDURE — 3008F BODY MASS INDEX DOCD: CPT | Performed by: FAMILY MEDICINE

## 2022-10-10 RX ORDER — FUROSEMIDE 20 MG/1
20 TABLET ORAL DAILY
Qty: 90 TABLET | Refills: 1 | Status: SHIPPED | OUTPATIENT
Start: 2022-10-10

## 2022-10-10 NOTE — PATIENT INSTRUCTIONS
Dr. Aletha Sanon [facility]  [address] [facility]  [phone]    Luis Pruitt, [id number]  Phone: [phone] [address], [facility] [address]  Phone: [phone]

## 2022-10-14 PROBLEM — E11.9 TYPE 2 DIABETES MELLITUS WITHOUT COMPLICATION, WITHOUT LONG-TERM CURRENT USE OF INSULIN (HCC): Status: RESOLVED | Noted: 2020-12-30 | Resolved: 2022-10-14

## 2022-10-16 ENCOUNTER — HOSPITAL ENCOUNTER (OUTPATIENT)
Dept: GENERAL RADIOLOGY | Age: 47
End: 2022-10-16
Attending: FAMILY MEDICINE
Payer: COMMERCIAL

## 2022-10-16 ENCOUNTER — HOSPITAL ENCOUNTER (OUTPATIENT)
Dept: BONE DENSITY | Age: 47
End: 2022-10-16
Attending: FAMILY MEDICINE
Payer: COMMERCIAL

## 2022-10-16 ENCOUNTER — HOSPITAL ENCOUNTER (OUTPATIENT)
Dept: GENERAL RADIOLOGY | Age: 47
Discharge: HOME | End: 2022-10-16
Attending: FAMILY MEDICINE
Payer: COMMERCIAL

## 2022-10-16 ENCOUNTER — HOSPITAL ENCOUNTER (OUTPATIENT)
Dept: BONE DENSITY | Age: 47
Discharge: HOME | End: 2022-10-16
Attending: FAMILY MEDICINE
Payer: COMMERCIAL

## 2022-10-16 DIAGNOSIS — M25.551 BILATERAL HIP PAIN: ICD-10-CM

## 2022-10-16 DIAGNOSIS — G89.29 OTHER CHRONIC PAIN: ICD-10-CM

## 2022-10-16 DIAGNOSIS — Z87.81 HISTORY OF FOOT FRACTURE: ICD-10-CM

## 2022-10-16 DIAGNOSIS — M25.552 BILATERAL HIP PAIN: ICD-10-CM

## 2022-10-16 PROCEDURE — 73522 X-RAY EXAM HIPS BI 3-4 VIEWS: CPT | Performed by: FAMILY MEDICINE

## 2022-10-16 PROCEDURE — 77080 DXA BONE DENSITY AXIAL: CPT | Performed by: FAMILY MEDICINE

## 2022-10-16 PROCEDURE — 73130 X-RAY EXAM OF HAND: CPT | Performed by: FAMILY MEDICINE

## 2022-10-18 ENCOUNTER — LABORATORY ENCOUNTER (OUTPATIENT)
Dept: LAB | Age: 47
End: 2022-10-18
Attending: FAMILY MEDICINE
Payer: COMMERCIAL

## 2022-10-18 ENCOUNTER — TELEPHONE (OUTPATIENT)
Dept: FAMILY MEDICINE CLINIC | Facility: CLINIC | Age: 47
End: 2022-10-18

## 2022-10-18 DIAGNOSIS — M25.441 SWELLING OF JOINT OF RIGHT HAND: ICD-10-CM

## 2022-10-18 DIAGNOSIS — G89.29 OTHER CHRONIC PAIN: ICD-10-CM

## 2022-10-18 DIAGNOSIS — R20.2 NUMBNESS AND TINGLING IN RIGHT HAND: ICD-10-CM

## 2022-10-18 DIAGNOSIS — R20.0 NUMBNESS AND TINGLING IN RIGHT HAND: ICD-10-CM

## 2022-10-18 DIAGNOSIS — Z87.81 PERSONAL HISTORY OF TRAUMATIC FRACTURE: Primary | ICD-10-CM

## 2022-10-18 DIAGNOSIS — Z87.81 HISTORY OF FOOT FRACTURE: ICD-10-CM

## 2022-10-18 LAB
BASOPHILS # BLD AUTO: 0.08 X10(3) UL (ref 0–0.2)
BASOPHILS NFR BLD AUTO: 1.6 %
EOSINOPHIL # BLD AUTO: 0.13 X10(3) UL (ref 0–0.7)
EOSINOPHIL NFR BLD AUTO: 2.5 %
ERYTHROCYTE [DISTWIDTH] IN BLOOD BY AUTOMATED COUNT: 13.2 %
HCT VFR BLD AUTO: 43.1 %
HGB BLD-MCNC: 13.7 G/DL
IMM GRANULOCYTES # BLD AUTO: 0.03 X10(3) UL (ref 0–1)
IMM GRANULOCYTES NFR BLD: 0.6 %
LYMPHOCYTES # BLD AUTO: 2.11 X10(3) UL (ref 1–4)
LYMPHOCYTES NFR BLD AUTO: 41.4 %
MCH RBC QN AUTO: 27.2 PG (ref 26–34)
MCHC RBC AUTO-ENTMCNC: 31.8 G/DL (ref 31–37)
MCV RBC AUTO: 85.7 FL
MONOCYTES # BLD AUTO: 0.38 X10(3) UL (ref 0.1–1)
MONOCYTES NFR BLD AUTO: 7.5 %
NEUTROPHILS # BLD AUTO: 2.37 X10 (3) UL (ref 1.5–7.7)
NEUTROPHILS # BLD AUTO: 2.37 X10(3) UL (ref 1.5–7.7)
NEUTROPHILS NFR BLD AUTO: 46.4 %
PLATELET # BLD AUTO: 188 10(3)UL (ref 150–450)
PTH-INTACT SERPL-MCNC: 64.1 PG/ML (ref 18.5–88)
RBC # BLD AUTO: 5.03 X10(6)UL
VIT B12 SERPL-MCNC: 535 PG/ML (ref 193–986)
VIT D+METAB SERPL-MCNC: 30.4 NG/ML (ref 30–100)
WBC # BLD AUTO: 5.1 X10(3) UL (ref 4–11)

## 2022-10-18 PROCEDURE — 82306 VITAMIN D 25 HYDROXY: CPT | Performed by: FAMILY MEDICINE

## 2022-10-18 PROCEDURE — 82607 VITAMIN B-12: CPT | Performed by: FAMILY MEDICINE

## 2022-10-18 PROCEDURE — 85025 COMPLETE CBC W/AUTO DIFF WBC: CPT | Performed by: FAMILY MEDICINE

## 2022-10-18 PROCEDURE — 83970 ASSAY OF PARATHORMONE: CPT | Performed by: FAMILY MEDICINE

## 2022-10-18 PROCEDURE — 86038 ANTINUCLEAR ANTIBODIES: CPT | Performed by: FAMILY MEDICINE

## 2022-10-18 NOTE — TELEPHONE ENCOUNTER
Patient went to get labs today.  She was told by the  that there was one test for her that required her to do a spinal tap, so that her spinal fluid can be tested     She is very confused as to why she would need that, nothing like that was discussed

## 2022-10-18 NOTE — TELEPHONE ENCOUNTER
----- Message from Kimberly Montilla MD sent at 10/17/2022 10:49 AM CDT -----  Hip OA, ok to see our ortho group.     XR HIP W OR WO PELVIS 3 OR 4 VIEWS, BILAT

## 2022-10-18 NOTE — TELEPHONE ENCOUNTER
----- Message from Lynette Bull MD sent at 10/17/2022 10:48 AM CDT -----  OA  ok to see        THE [facility] orthopedics:    Dr. Nadira Ray: hand and upper extremity. Orthopedic Surgery  [address] [facility], [address] [place] [address], [address]   [address] [place] [address], [address]  [place], [place]   Tel:[phone]       XR HAND (MIN 3 VIEWS), RIGHT

## 2022-10-19 NOTE — TELEPHONE ENCOUNTER
Manpreet Buenrostro MD  You 10 minutes ago (11:51 AM)     LP    Ok to skip it for now    Message text      Nory Montiel MD 2 hours ago (9:58 AM)     KP    I'm only seeing spinal fluid    Routing comment

## 2022-10-20 LAB — NUCLEAR IGG TITR SER IF: NEGATIVE {TITER}

## 2022-11-04 ENCOUNTER — OFFICE VISIT (OUTPATIENT)
Dept: FAMILY MEDICINE CLINIC | Facility: CLINIC | Age: 47
End: 2022-11-04
Payer: COMMERCIAL

## 2022-11-04 VITALS
RESPIRATION RATE: 18 BRPM | WEIGHT: 293 LBS | HEIGHT: 67.5 IN | BODY MASS INDEX: 45.45 KG/M2 | OXYGEN SATURATION: 99 % | SYSTOLIC BLOOD PRESSURE: 120 MMHG | HEART RATE: 87 BPM | DIASTOLIC BLOOD PRESSURE: 80 MMHG

## 2022-11-04 DIAGNOSIS — R53.83 OTHER FATIGUE: ICD-10-CM

## 2022-11-04 DIAGNOSIS — E66.01 MORBID OBESITY (HCC): ICD-10-CM

## 2022-11-04 DIAGNOSIS — K21.9 GASTROESOPHAGEAL REFLUX DISEASE, UNSPECIFIED WHETHER ESOPHAGITIS PRESENT: ICD-10-CM

## 2022-11-04 DIAGNOSIS — G89.4 CHRONIC PAIN SYNDROME: ICD-10-CM

## 2022-11-04 DIAGNOSIS — Z00.00 ROUTINE GENERAL MEDICAL EXAMINATION AT A HEALTH CARE FACILITY: Primary | ICD-10-CM

## 2022-11-04 DIAGNOSIS — Z13.220 SCREENING FOR LIPOID DISORDERS: ICD-10-CM

## 2022-11-04 PROCEDURE — 3079F DIAST BP 80-89 MM HG: CPT | Performed by: FAMILY MEDICINE

## 2022-11-04 PROCEDURE — 3008F BODY MASS INDEX DOCD: CPT | Performed by: FAMILY MEDICINE

## 2022-11-04 PROCEDURE — 3074F SYST BP LT 130 MM HG: CPT | Performed by: FAMILY MEDICINE

## 2022-11-04 PROCEDURE — 99213 OFFICE O/P EST LOW 20 MIN: CPT | Performed by: FAMILY MEDICINE

## 2022-11-04 PROCEDURE — 99396 PREV VISIT EST AGE 40-64: CPT | Performed by: FAMILY MEDICINE

## 2022-11-04 RX ORDER — LOSARTAN POTASSIUM 25 MG/1
25 TABLET ORAL DAILY
Qty: 90 TABLET | Refills: 3 | Status: SHIPPED | OUTPATIENT
Start: 2022-11-04

## 2022-11-04 RX ORDER — PANTOPRAZOLE SODIUM 40 MG/1
40 TABLET, DELAYED RELEASE ORAL
Qty: 90 TABLET | Refills: 3 | Status: SHIPPED | OUTPATIENT
Start: 2022-11-04

## 2022-11-08 ENCOUNTER — OFFICE VISIT (OUTPATIENT)
Dept: RHEUMATOLOGY | Facility: CLINIC | Age: 47
End: 2022-11-08
Payer: COMMERCIAL

## 2022-11-08 VITALS
BODY MASS INDEX: 45.45 KG/M2 | TEMPERATURE: 98 F | HEIGHT: 67.5 IN | WEIGHT: 293 LBS | RESPIRATION RATE: 19 BRPM | DIASTOLIC BLOOD PRESSURE: 80 MMHG | SYSTOLIC BLOOD PRESSURE: 125 MMHG | HEART RATE: 101 BPM | OXYGEN SATURATION: 98 %

## 2022-11-08 DIAGNOSIS — M10.09 IDIOPATHIC GOUT OF MULTIPLE SITES, UNSPECIFIED CHRONICITY: Primary | ICD-10-CM

## 2022-11-08 DIAGNOSIS — M79.18 CHRONIC MUSCULOSKELETAL PAIN: ICD-10-CM

## 2022-11-08 DIAGNOSIS — G89.29 CHRONIC MUSCULOSKELETAL PAIN: ICD-10-CM

## 2022-11-08 DIAGNOSIS — R20.0 NUMBNESS AND TINGLING IN RIGHT HAND: ICD-10-CM

## 2022-11-08 DIAGNOSIS — E11.42 DIABETIC POLYNEUROPATHY ASSOCIATED WITH TYPE 2 DIABETES MELLITUS (HCC): ICD-10-CM

## 2022-11-08 DIAGNOSIS — R20.2 NUMBNESS AND TINGLING IN RIGHT HAND: ICD-10-CM

## 2022-11-08 PROCEDURE — 3079F DIAST BP 80-89 MM HG: CPT | Performed by: INTERNAL MEDICINE

## 2022-11-08 PROCEDURE — 3008F BODY MASS INDEX DOCD: CPT | Performed by: INTERNAL MEDICINE

## 2022-11-08 PROCEDURE — 3074F SYST BP LT 130 MM HG: CPT | Performed by: INTERNAL MEDICINE

## 2022-11-08 PROCEDURE — 99214 OFFICE O/P EST MOD 30 MIN: CPT | Performed by: INTERNAL MEDICINE

## 2022-11-08 RX ORDER — PHENTERMINE HYDROCHLORIDE 37.5 MG/1
37.5 CAPSULE ORAL EVERY MORNING
COMMUNITY

## 2022-11-08 RX ORDER — MELOXICAM 15 MG/1
15 TABLET ORAL DAILY
Qty: 30 TABLET | Refills: 2 | Status: SHIPPED | OUTPATIENT
Start: 2022-11-08

## 2022-11-08 RX ORDER — DULOXETIN HYDROCHLORIDE 30 MG/1
CAPSULE, DELAYED RELEASE ORAL
Qty: 165 CAPSULE | Refills: 1 | Status: SHIPPED | OUTPATIENT
Start: 2022-11-08 | End: 2023-02-06

## 2022-11-08 NOTE — PATIENT INSTRUCTIONS
-agree with nerve study, call to get this done sooner. For leg swelling: increase the lasix to 1 pill at 9 AM and then 1 pill at 3 pm. Do this for 3 days.   -For carpal tunnel syndrome, wear wrist splints to be worn nightly.   -Continue allopurinol 200 mg daily  -continue colchicine 0.6 mg daily  -For neuropathy and chronic musculoskeletal pain. Start cymbalta 30 mg daily x 2 weeks, then 60 mg daily. I prescribed it to your pharmacy. -Take meloxicam 15 mg daily with food, don't take ibuprofen, naproxen, voltaren the same day, they are in the same family.   -for hip pain: try these exercises

## 2022-11-14 ENCOUNTER — OFFICE VISIT (OUTPATIENT)
Dept: ORTHOPEDICS CLINIC | Facility: CLINIC | Age: 47
End: 2022-11-14
Payer: COMMERCIAL

## 2022-11-14 VITALS — BODY MASS INDEX: 45.45 KG/M2 | WEIGHT: 293 LBS | HEIGHT: 67.5 IN

## 2022-11-14 DIAGNOSIS — M18.10 ARTHRITIS OF CARPOMETACARPAL (CMC) JOINT OF THUMB: ICD-10-CM

## 2022-11-14 DIAGNOSIS — G56.03 BILATERAL CARPAL TUNNEL SYNDROME: Primary | ICD-10-CM

## 2022-11-14 PROCEDURE — 99204 OFFICE O/P NEW MOD 45 MIN: CPT | Performed by: ORTHOPAEDIC SURGERY

## 2022-11-14 PROCEDURE — 20600 DRAIN/INJ JOINT/BURSA W/O US: CPT | Performed by: ORTHOPAEDIC SURGERY

## 2022-11-14 PROCEDURE — 3008F BODY MASS INDEX DOCD: CPT | Performed by: ORTHOPAEDIC SURGERY

## 2022-11-14 RX ORDER — BETAMETHASONE SODIUM PHOSPHATE AND BETAMETHASONE ACETATE 3; 3 MG/ML; MG/ML
6 INJECTION, SUSPENSION INTRA-ARTICULAR; INTRALESIONAL; INTRAMUSCULAR; SOFT TISSUE ONCE
Status: COMPLETED | OUTPATIENT
Start: 2022-11-14 | End: 2022-11-14

## 2022-11-14 RX ADMIN — BETAMETHASONE SODIUM PHOSPHATE AND BETAMETHASONE ACETATE 6 MG: 3; 3 INJECTION, SUSPENSION INTRA-ARTICULAR; INTRALESIONAL; INTRAMUSCULAR; SOFT TISSUE at 11:44:00

## 2022-11-21 ENCOUNTER — TELEPHONE (OUTPATIENT)
Dept: FAMILY MEDICINE CLINIC | Facility: CLINIC | Age: 47
End: 2022-11-21

## 2022-11-21 NOTE — TELEPHONE ENCOUNTER
Pt's spouse dropped off health screening form. Pt completed physical with Dr. Dennie Ratel on 11/4/2022. However, she has not completed labs yet. I will send her a [employer] Access message and hang on to the form at my desk.

## 2022-12-07 ENCOUNTER — PROCEDURE VISIT (OUTPATIENT)
Dept: PHYSICAL MEDICINE AND REHAB | Facility: CLINIC | Age: 47
End: 2022-12-07
Payer: COMMERCIAL

## 2022-12-07 DIAGNOSIS — R20.2 NUMBNESS AND TINGLING IN BOTH HANDS: Primary | ICD-10-CM

## 2022-12-07 DIAGNOSIS — R20.0 NUMBNESS AND TINGLING IN BOTH HANDS: Primary | ICD-10-CM

## 2022-12-07 PROCEDURE — 95911 NRV CNDJ TEST 9-10 STUDIES: CPT | Performed by: PHYSICAL MEDICINE & REHABILITATION

## 2022-12-07 PROCEDURE — 95886 MUSC TEST DONE W/N TEST COMP: CPT | Performed by: PHYSICAL MEDICINE & REHABILITATION

## 2022-12-13 ENCOUNTER — LABORATORY ENCOUNTER (OUTPATIENT)
Dept: LAB | Age: 47
End: 2022-12-13
Attending: INTERNAL MEDICINE
Payer: COMMERCIAL

## 2022-12-13 ENCOUNTER — TELEPHONE (OUTPATIENT)
Dept: ORTHOPEDICS CLINIC | Facility: CLINIC | Age: 47
End: 2022-12-13

## 2022-12-13 ENCOUNTER — OFFICE VISIT (OUTPATIENT)
Dept: ORTHOPEDICS CLINIC | Facility: CLINIC | Age: 47
End: 2022-12-13
Payer: COMMERCIAL

## 2022-12-13 ENCOUNTER — TELEPHONE (OUTPATIENT)
Dept: RHEUMATOLOGY | Facility: CLINIC | Age: 47
End: 2022-12-13

## 2022-12-13 VITALS — BODY MASS INDEX: 45.45 KG/M2 | HEIGHT: 67.5 IN | WEIGHT: 293 LBS

## 2022-12-13 DIAGNOSIS — G56.01 CARPAL TUNNEL SYNDROME OF RIGHT WRIST: Primary | ICD-10-CM

## 2022-12-13 DIAGNOSIS — R20.2 NUMBNESS AND TINGLING IN RIGHT HAND: ICD-10-CM

## 2022-12-13 DIAGNOSIS — E66.01 MORBID OBESITY (HCC): ICD-10-CM

## 2022-12-13 DIAGNOSIS — Z87.81 PERSONAL HISTORY OF TRAUMATIC FRACTURE: ICD-10-CM

## 2022-12-13 DIAGNOSIS — Z51.81 THERAPEUTIC DRUG MONITORING: ICD-10-CM

## 2022-12-13 DIAGNOSIS — R79.0 LOW MAGNESIUM LEVEL: ICD-10-CM

## 2022-12-13 DIAGNOSIS — Z13.220 SCREENING FOR LIPOID DISORDERS: ICD-10-CM

## 2022-12-13 DIAGNOSIS — E11.42 DIABETIC POLYNEUROPATHY ASSOCIATED WITH TYPE 2 DIABETES MELLITUS (HCC): ICD-10-CM

## 2022-12-13 DIAGNOSIS — M10.09 IDIOPATHIC GOUT OF MULTIPLE SITES, UNSPECIFIED CHRONICITY: ICD-10-CM

## 2022-12-13 DIAGNOSIS — G89.29 OTHER CHRONIC PAIN: ICD-10-CM

## 2022-12-13 DIAGNOSIS — M10.09 IDIOPATHIC GOUT OF MULTIPLE SITES, UNSPECIFIED CHRONICITY: Primary | ICD-10-CM

## 2022-12-13 DIAGNOSIS — I87.2 VENOUS INSUFFICIENCY: ICD-10-CM

## 2022-12-13 DIAGNOSIS — M25.441 SWELLING OF JOINT OF RIGHT HAND: Primary | ICD-10-CM

## 2022-12-13 DIAGNOSIS — R74.01 ELEVATED ALT MEASUREMENT: ICD-10-CM

## 2022-12-13 DIAGNOSIS — R20.0 NUMBNESS AND TINGLING IN RIGHT HAND: ICD-10-CM

## 2022-12-13 LAB
ALBUMIN SERPL-MCNC: 3.9 G/DL (ref 3.4–5)
ALBUMIN/GLOB SERPL: 1.3 {RATIO} (ref 1–2)
ALP LIVER SERPL-CCNC: 97 U/L
ALT SERPL-CCNC: 83 U/L
ANION GAP SERPL CALC-SCNC: 12 MMOL/L (ref 0–18)
AST SERPL-CCNC: 72 U/L (ref 15–37)
BILIRUB SERPL-MCNC: 1.1 MG/DL (ref 0.1–2)
BUN BLD-MCNC: 14 MG/DL (ref 7–18)
CALCIUM BLD-MCNC: 9.6 MG/DL (ref 8.5–10.1)
CHLORIDE SERPL-SCNC: 106 MMOL/L (ref 98–112)
CHOLEST SERPL-MCNC: 170 MG/DL (ref ?–200)
CK SERPL-CCNC: 2132 U/L
CO2 SERPL-SCNC: 26 MMOL/L (ref 21–32)
CREAT BLD-MCNC: 1.02 MG/DL
EST. AVERAGE GLUCOSE BLD GHB EST-MCNC: 131 MG/DL (ref 68–126)
FASTING PATIENT LIPID ANSWER: YES
FASTING STATUS PATIENT QL REPORTED: YES
GFR SERPLBLD BASED ON 1.73 SQ M-ARVRAT: 67 ML/MIN/1.73M2 (ref 60–?)
GLOBULIN PLAS-MCNC: 3 G/DL (ref 2.8–4.4)
GLUCOSE BLD-MCNC: 141 MG/DL (ref 70–99)
HBA1C MFR BLD: 6.2 % (ref ?–5.7)
HDLC SERPL-MCNC: 40 MG/DL (ref 40–59)
LDLC SERPL CALC-MCNC: 82 MG/DL (ref ?–100)
MAGNESIUM SERPL-MCNC: 1.5 MG/DL (ref 1.6–2.6)
NONHDLC SERPL-MCNC: 130 MG/DL (ref ?–130)
OSMOLALITY SERPL CALC.SUM OF ELEC: 301 MOSM/KG (ref 275–295)
POTASSIUM SERPL-SCNC: 3.3 MMOL/L (ref 3.5–5.1)
PROT SERPL-MCNC: 6.9 G/DL (ref 6.4–8.2)
SODIUM SERPL-SCNC: 144 MMOL/L (ref 136–145)
TRIGL SERPL-MCNC: 292 MG/DL (ref 30–149)
URATE SERPL-MCNC: 6.8 MG/DL
VLDLC SERPL CALC-MCNC: 47 MG/DL (ref 0–30)

## 2022-12-13 PROCEDURE — 3008F BODY MASS INDEX DOCD: CPT | Performed by: ORTHOPAEDIC SURGERY

## 2022-12-13 PROCEDURE — 82550 ASSAY OF CK (CPK): CPT | Performed by: INTERNAL MEDICINE

## 2022-12-13 PROCEDURE — 80053 COMPREHEN METABOLIC PANEL: CPT | Performed by: INTERNAL MEDICINE

## 2022-12-13 PROCEDURE — 99214 OFFICE O/P EST MOD 30 MIN: CPT | Performed by: ORTHOPAEDIC SURGERY

## 2022-12-13 PROCEDURE — 86225 DNA ANTIBODY NATIVE: CPT | Performed by: INTERNAL MEDICINE

## 2022-12-13 PROCEDURE — 84550 ASSAY OF BLOOD/URIC ACID: CPT | Performed by: INTERNAL MEDICINE

## 2022-12-13 PROCEDURE — 86038 ANTINUCLEAR ANTIBODIES: CPT | Performed by: INTERNAL MEDICINE

## 2022-12-13 PROCEDURE — 83036 HEMOGLOBIN GLYCOSYLATED A1C: CPT | Performed by: FAMILY MEDICINE

## 2022-12-13 PROCEDURE — 80061 LIPID PANEL: CPT | Performed by: FAMILY MEDICINE

## 2022-12-13 PROCEDURE — 83735 ASSAY OF MAGNESIUM: CPT | Performed by: INTERNAL MEDICINE

## 2022-12-13 NOTE — PROGRESS NOTES
1. Have you been to the ER, urgent care clinic since your last visit? Hospitalized since your last visit? Yes  10/17/16  Chest pain     2. Have you seen or consulted any other health care providers outside of the [address] [place] [address] since your last visit? Include any pap smears or colon screening.  No OR BOOKING SHEET   Name: Janette Henning  MRN: [id number]   : 1975  Surgical Consent:  Right endoscopic carpal tunnel is possible open  Diagnosis:   Carpal tunnel syndrome of right wrist [G56.01]  Laterality:   Right    Procedure Codes:  Carpal Tunnel Release Endoscopic (82991)  Disposition:  Outpatient  Operative Time:  15 mins  Antibiotics:  Ancef  Anesthesia Type:  Hayes Block  Clearance:   NONE  Equipment:  ECTR: Eureka Blade, Microaire Endoscopic System, Lead Hand (on standby)  or Dupuytren's: Lead hand, Eureka blade, 4 x 15 blade's sterile rubber bands, 4-0 nylon, bacitracin, Adaptic, 5 x 30 plaster sheets, 2 inch Ace wrap  Positioning:  Supine with arm table on transport cart  Assistant request:   Assistant: Crys Matthews PA-C  Anesthesiologist Request:   None    Follow Up    7-10 days post op with César Saez    Other:     Do not shave or clip arm hair

## 2022-12-14 ENCOUNTER — TELEPHONE (OUTPATIENT)
Dept: RHEUMATOLOGY | Facility: CLINIC | Age: 47
End: 2022-12-14

## 2022-12-14 ENCOUNTER — PATIENT MESSAGE (OUTPATIENT)
Dept: FAMILY MEDICINE CLINIC | Facility: CLINIC | Age: 47
End: 2022-12-14

## 2022-12-14 DIAGNOSIS — E87.6 HYPOKALEMIA: ICD-10-CM

## 2022-12-14 DIAGNOSIS — Z51.81 THERAPEUTIC DRUG MONITORING: ICD-10-CM

## 2022-12-14 DIAGNOSIS — R74.8 ELEVATED CK: Primary | ICD-10-CM

## 2022-12-14 DIAGNOSIS — R74.01 ELEVATED ALT MEASUREMENT: ICD-10-CM

## 2022-12-14 DIAGNOSIS — E83.42 HYPOMAGNESEMIA: Primary | ICD-10-CM

## 2022-12-14 NOTE — TELEPHONE ENCOUNTER
Called patient. Asymptomatic hyper CKemia. Denies dark urine/blood in urine had CSI to right wrist. No new weakness or muscle aches. No new infections. Covid in October. But no other infections. Denies heavy drinking, illicits. No new medications. Stop colchicine and atorvastatin for the time being. Hold off on heavy exercise. Repeat CK, LDH, lytes this Friday.

## 2022-12-14 NOTE — TELEPHONE ENCOUNTER
From: Jackie Granados  To: Francesca Tobar MD  Sent: 12/14/2022 3:40 PM CST  Subject: Question regarding MAGNESIUM and POTASSIUM LEVELS    Good day,  Dr. Vanessa Middleton recommended I follow up with you regarding my low levels of magnesium and potassium? I have been getting frequent, painful muscle spasms as well. Also, I am following up on my physical form I have to be completed after I had my labs drawn?     Thank you,  Vicky Weir

## 2022-12-14 NOTE — TELEPHONE ENCOUNTER
Dr. Anu Terrell advised Pt speak with you regarding low magnesium and potassium on labs as well as insight r/t frequent muscle spasms. LOV 11/04/22. Please advise.

## 2022-12-15 ENCOUNTER — TELEPHONE (OUTPATIENT)
Dept: RHEUMATOLOGY | Facility: CLINIC | Age: 47
End: 2022-12-15

## 2022-12-15 DIAGNOSIS — R74.8 ELEVATED CK: Primary | ICD-10-CM

## 2022-12-15 DIAGNOSIS — Z51.81 THERAPEUTIC DRUG MONITORING: ICD-10-CM

## 2022-12-15 DIAGNOSIS — M10.09 IDIOPATHIC GOUT OF MULTIPLE SITES, UNSPECIFIED CHRONICITY: ICD-10-CM

## 2022-12-15 DIAGNOSIS — R74.01 ELEVATED ALT MEASUREMENT: ICD-10-CM

## 2022-12-15 LAB
DSDNA IGG SERPL IA-ACNC: 0.6 IU/ML
ENA AB SER QL IA: 0.2 UG/L
ENA AB SER QL IA: NEGATIVE

## 2022-12-15 RX ORDER — POTASSIUM CHLORIDE 1500 MG/1
20 TABLET, FILM COATED, EXTENDED RELEASE ORAL DAILY
Qty: 90 TABLET | Refills: 1 | Status: SHIPPED | OUTPATIENT
Start: 2022-12-15 | End: 2023-03-15

## 2022-12-15 RX ORDER — ALLOPURINOL 300 MG/1
300 TABLET ORAL DAILY
Qty: 90 TABLET | Refills: 3 | Status: SHIPPED | OUTPATIENT
Start: 2022-12-15

## 2022-12-15 NOTE — TELEPHONE ENCOUNTER
Sent prescription for Potassium 20 meq daily -take 40 meq x 1 then 20 meq daily   Magnesium 400 mg BID daily - will recheck labs in 2 weeks

## 2022-12-15 NOTE — TELEPHONE ENCOUNTER
Pharmacy calling requesting to change Magnesium 400 mg Capsule to tablet.  Discuss with Dr Suzi Dakins ok'd to change / verbal given to pharmacist.

## 2022-12-15 NOTE — TELEPHONE ENCOUNTER
----- Message from Arminda Flynn RN sent at 12/15/2022  7:42 AM CST -----  Regarding: FW: Question regarding MAGNESIUM    ----- Message -----  From: Juan R De Anda  Sent: 12/15/2022   5:13 AM CST  To: [facility] Clinical Staff  Subject: Question regarding MAGNESIUM                     Good morning,    Did you want me to have the labs completed Friday 12/16 or on Saturday 12/17? Because the labs currently say complete on or after 12/17.      Thank you,  Layla Rader

## 2022-12-16 ENCOUNTER — PATIENT MESSAGE (OUTPATIENT)
Dept: FAMILY MEDICINE CLINIC | Facility: CLINIC | Age: 47
End: 2022-12-16

## 2022-12-16 ENCOUNTER — LAB ENCOUNTER (OUTPATIENT)
Dept: LAB | Age: 47
End: 2022-12-16
Attending: INTERNAL MEDICINE
Payer: COMMERCIAL

## 2022-12-16 DIAGNOSIS — E83.42 HYPOMAGNESEMIA: ICD-10-CM

## 2022-12-16 DIAGNOSIS — R74.8 ELEVATED CK: ICD-10-CM

## 2022-12-16 DIAGNOSIS — M10.09 IDIOPATHIC GOUT OF MULTIPLE SITES, UNSPECIFIED CHRONICITY: ICD-10-CM

## 2022-12-16 DIAGNOSIS — R74.01 ELEVATED ALT MEASUREMENT: ICD-10-CM

## 2022-12-16 DIAGNOSIS — Z51.81 THERAPEUTIC DRUG MONITORING: ICD-10-CM

## 2022-12-16 DIAGNOSIS — E87.6 HYPOKALEMIA: ICD-10-CM

## 2022-12-16 LAB
ALBUMIN SERPL-MCNC: 3.8 G/DL (ref 3.4–5)
ALBUMIN/GLOB SERPL: 1.2 {RATIO} (ref 1–2)
ALP LIVER SERPL-CCNC: 98 U/L
ALT SERPL-CCNC: 82 U/L
ANION GAP SERPL CALC-SCNC: 4 MMOL/L (ref 0–18)
AST SERPL-CCNC: 60 U/L (ref 15–37)
BILIRUB SERPL-MCNC: 1.1 MG/DL (ref 0.1–2)
BUN BLD-MCNC: 14 MG/DL (ref 7–18)
CALCIUM BLD-MCNC: 9.2 MG/DL (ref 8.5–10.1)
CHLORIDE SERPL-SCNC: 106 MMOL/L (ref 98–112)
CK SERPL-CCNC: 626 U/L
CO2 SERPL-SCNC: 31 MMOL/L (ref 21–32)
CREAT BLD-MCNC: 1 MG/DL
FASTING STATUS PATIENT QL REPORTED: YES
GFR SERPLBLD BASED ON 1.73 SQ M-ARVRAT: 69 ML/MIN/1.73M2 (ref 60–?)
GLOBULIN PLAS-MCNC: 3.1 G/DL (ref 2.8–4.4)
GLUCOSE BLD-MCNC: 135 MG/DL (ref 70–99)
LDH SERPL L TO P-CCNC: 289 U/L
MAGNESIUM SERPL-MCNC: 1.5 MG/DL (ref 1.6–2.6)
OSMOLALITY SERPL CALC.SUM OF ELEC: 295 MOSM/KG (ref 275–295)
POTASSIUM SERPL-SCNC: 3.1 MMOL/L (ref 3.5–5.1)
PROT SERPL-MCNC: 6.9 G/DL (ref 6.4–8.2)
SODIUM SERPL-SCNC: 141 MMOL/L (ref 136–145)

## 2022-12-16 PROCEDURE — 80053 COMPREHEN METABOLIC PANEL: CPT | Performed by: INTERNAL MEDICINE

## 2022-12-16 PROCEDURE — 83615 LACTATE (LD) (LDH) ENZYME: CPT | Performed by: INTERNAL MEDICINE

## 2022-12-16 PROCEDURE — 83735 ASSAY OF MAGNESIUM: CPT | Performed by: INTERNAL MEDICINE

## 2022-12-16 PROCEDURE — 82550 ASSAY OF CK (CPK): CPT | Performed by: INTERNAL MEDICINE

## 2022-12-16 NOTE — TELEPHONE ENCOUNTER
From: Mauricio Luke  Sent: 12/16/2022 7:21 AM CST  To: [facility] Clinical Staff  Subject: Question regarding MAGNESIUM and POTASSIUM LEVELS    Good morning,    Just checking to see if you received my previous message regarding my labs?     Thank you,  Awilda Martinez

## 2022-12-16 NOTE — TELEPHONE ENCOUNTER
Form signed by Dr. John Borrego. Will send pt Biz[place]t message and place form at front for pt pick-up. Copy sent to scan and copy kept in back.

## 2022-12-19 ENCOUNTER — TELEPHONE (OUTPATIENT)
Dept: RHEUMATOLOGY | Facility: CLINIC | Age: 47
End: 2022-12-19

## 2022-12-19 DIAGNOSIS — Z51.81 THERAPEUTIC DRUG MONITORING: ICD-10-CM

## 2022-12-19 DIAGNOSIS — R74.01 ELEVATED ALT MEASUREMENT: ICD-10-CM

## 2022-12-19 DIAGNOSIS — M10.09 IDIOPATHIC GOUT OF MULTIPLE SITES, UNSPECIFIED CHRONICITY: ICD-10-CM

## 2022-12-19 DIAGNOSIS — R74.8 ELEVATED CK: Primary | ICD-10-CM

## 2022-12-27 RX ORDER — SODIUM CHLORIDE, SODIUM LACTATE, POTASSIUM CHLORIDE, CALCIUM CHLORIDE 600; 310; 30; 20 MG/100ML; MG/100ML; MG/100ML; MG/100ML
INJECTION, SOLUTION INTRAVENOUS CONTINUOUS
Status: CANCELLED | OUTPATIENT
Start: 2022-12-27

## 2022-12-30 ENCOUNTER — LAB ENCOUNTER (OUTPATIENT)
Dept: LAB | Age: 47
End: 2022-12-30
Attending: INTERNAL MEDICINE
Payer: COMMERCIAL

## 2022-12-30 ENCOUNTER — TELEPHONE (OUTPATIENT)
Dept: RHEUMATOLOGY | Facility: CLINIC | Age: 47
End: 2022-12-30

## 2022-12-30 DIAGNOSIS — R74.8 ELEVATED CK: Primary | ICD-10-CM

## 2022-12-30 DIAGNOSIS — R74.01 ELEVATED ALT MEASUREMENT: ICD-10-CM

## 2022-12-30 DIAGNOSIS — Z51.81 THERAPEUTIC DRUG MONITORING: ICD-10-CM

## 2022-12-30 DIAGNOSIS — R74.8 ELEVATED CK: ICD-10-CM

## 2022-12-30 DIAGNOSIS — M10.09 IDIOPATHIC GOUT OF MULTIPLE SITES, UNSPECIFIED CHRONICITY: ICD-10-CM

## 2022-12-30 LAB
ALBUMIN SERPL-MCNC: 3.7 G/DL (ref 3.4–5)
ALBUMIN/GLOB SERPL: 1.1 {RATIO} (ref 1–2)
ALP LIVER SERPL-CCNC: 114 U/L
ALT SERPL-CCNC: 94 U/L
ANION GAP SERPL CALC-SCNC: 4 MMOL/L (ref 0–18)
AST SERPL-CCNC: 67 U/L (ref 15–37)
BILIRUB SERPL-MCNC: 0.9 MG/DL (ref 0.1–2)
BUN BLD-MCNC: 15 MG/DL (ref 7–18)
CALCIUM BLD-MCNC: 9.6 MG/DL (ref 8.5–10.1)
CHLORIDE SERPL-SCNC: 106 MMOL/L (ref 98–112)
CK SERPL-CCNC: 162 U/L
CO2 SERPL-SCNC: 28 MMOL/L (ref 21–32)
CREAT BLD-MCNC: 1.03 MG/DL
FASTING STATUS PATIENT QL REPORTED: YES
GFR SERPLBLD BASED ON 1.73 SQ M-ARVRAT: 67 ML/MIN/1.73M2 (ref 60–?)
GLOBULIN PLAS-MCNC: 3.3 G/DL (ref 2.8–4.4)
GLUCOSE BLD-MCNC: 159 MG/DL (ref 70–99)
LDH SERPL L TO P-CCNC: 257 U/L
MAGNESIUM SERPL-MCNC: 1.8 MG/DL (ref 1.6–2.6)
OSMOLALITY SERPL CALC.SUM OF ELEC: 290 MOSM/KG (ref 275–295)
POTASSIUM SERPL-SCNC: 4 MMOL/L (ref 3.5–5.1)
PROT SERPL-MCNC: 7 G/DL (ref 6.4–8.2)
SODIUM SERPL-SCNC: 138 MMOL/L (ref 136–145)
URATE SERPL-MCNC: 6.1 MG/DL

## 2022-12-30 PROCEDURE — 83735 ASSAY OF MAGNESIUM: CPT

## 2022-12-30 PROCEDURE — 83615 LACTATE (LD) (LDH) ENZYME: CPT

## 2022-12-30 PROCEDURE — 80053 COMPREHEN METABOLIC PANEL: CPT

## 2022-12-30 PROCEDURE — 84550 ASSAY OF BLOOD/URIC ACID: CPT

## 2022-12-30 PROCEDURE — 82550 ASSAY OF CK (CPK): CPT

## 2022-12-30 PROCEDURE — 36415 COLL VENOUS BLD VENIPUNCTURE: CPT

## 2023-01-02 ENCOUNTER — EKG ENCOUNTER (OUTPATIENT)
Dept: LAB | Age: 48
End: 2023-01-02
Attending: ORTHOPAEDIC SURGERY
Payer: COMMERCIAL

## 2023-01-02 ENCOUNTER — PATIENT MESSAGE (OUTPATIENT)
Dept: FAMILY MEDICINE CLINIC | Facility: CLINIC | Age: 48
End: 2023-01-02

## 2023-01-02 ENCOUNTER — LAB ENCOUNTER (OUTPATIENT)
Dept: LAB | Age: 48
End: 2023-01-02
Attending: ORTHOPAEDIC SURGERY
Payer: COMMERCIAL

## 2023-01-02 DIAGNOSIS — Z01.818 PRE-OP TESTING: ICD-10-CM

## 2023-01-02 LAB
ATRIAL RATE: 94 BPM
P AXIS: 37 DEGREES
P-R INTERVAL: 156 MS
Q-T INTERVAL: 350 MS
QRS DURATION: 68 MS
QTC CALCULATION (BEZET): 437 MS
R AXIS: 35 DEGREES
T AXIS: 65 DEGREES
VENTRICULAR RATE: 94 BPM

## 2023-01-02 PROCEDURE — 93010 ELECTROCARDIOGRAM REPORT: CPT | Performed by: INTERNAL MEDICINE

## 2023-01-02 PROCEDURE — 93005 ELECTROCARDIOGRAM TRACING: CPT

## 2023-01-03 ENCOUNTER — ANESTHESIA EVENT (OUTPATIENT)
Dept: SURGERY | Facility: HOSPITAL | Age: 48
End: 2023-01-03
Payer: COMMERCIAL

## 2023-01-03 LAB — SARS-COV-2 RNA RESP QL NAA+PROBE: NOT DETECTED

## 2023-01-03 NOTE — TELEPHONE ENCOUNTER
From: Chino Singh  To: Luann De La Garza MD  Sent: 1/2/2023 9:53 AM CST  Subject: Abnormal ECG    Hello and Good morning,    Can you please review my ECG? I had one today to clear me for my carpal tunnel surgery this Wednesday. It says I have a septal infarct?!!  Is that a heart attack?!!!      Thank you,  Moe Morin

## 2023-01-03 NOTE — TELEPHONE ENCOUNTER
Pt had pre-op EKG done 1/2 per Dr. Oscar Manriquez orders, she is asking if you can review & advise, she is concerned about new Septal Infarct.

## 2023-01-04 ENCOUNTER — HOSPITAL ENCOUNTER (OUTPATIENT)
Facility: HOSPITAL | Age: 48
Setting detail: HOSPITAL OUTPATIENT SURGERY
Discharge: HOME | End: 2023-01-04
Attending: ORTHOPAEDIC SURGERY | Admitting: ORTHOPAEDIC SURGERY
Payer: COMMERCIAL

## 2023-01-04 ENCOUNTER — ANESTHESIA (OUTPATIENT)
Dept: SURGERY | Facility: HOSPITAL | Age: 48
End: 2023-01-04
Payer: COMMERCIAL

## 2023-01-04 VITALS
HEART RATE: 82 BPM | DIASTOLIC BLOOD PRESSURE: 99 MMHG | TEMPERATURE: 98 F | WEIGHT: 293 LBS | BODY MASS INDEX: 45.45 KG/M2 | OXYGEN SATURATION: 93 % | HEIGHT: 67.5 IN | RESPIRATION RATE: 16 BRPM | SYSTOLIC BLOOD PRESSURE: 188 MMHG

## 2023-01-04 DIAGNOSIS — Z01.818 PRE-OP TESTING: Primary | ICD-10-CM

## 2023-01-04 LAB — GLUCOSE BLD-MCNC: 150 MG/DL (ref 70–99)

## 2023-01-04 PROCEDURE — 82962 GLUCOSE BLOOD TEST: CPT

## 2023-01-04 PROCEDURE — 01N54ZZ RELEASE MEDIAN NERVE, PERCUTANEOUS ENDOSCOPIC APPROACH: ICD-10-PCS | Performed by: ORTHOPAEDIC SURGERY

## 2023-01-04 RX ORDER — ONDANSETRON 2 MG/ML
INJECTION INTRAMUSCULAR; INTRAVENOUS AS NEEDED
Status: DISCONTINUED | OUTPATIENT
Start: 2023-01-04 | End: 2023-01-04 | Stop reason: SURG

## 2023-01-04 RX ORDER — DIPHENHYDRAMINE HYDROCHLORIDE 50 MG/ML
12.5 INJECTION INTRAMUSCULAR; INTRAVENOUS AS NEEDED
Status: DISCONTINUED | OUTPATIENT
Start: 2023-01-04 | End: 2023-01-04

## 2023-01-04 RX ORDER — METOCLOPRAMIDE HYDROCHLORIDE 5 MG/ML
INJECTION INTRAMUSCULAR; INTRAVENOUS
Status: DISCONTINUED
Start: 2023-01-04 | End: 2023-01-04

## 2023-01-04 RX ORDER — HYDROMORPHONE HYDROCHLORIDE 1 MG/ML
0.4 INJECTION, SOLUTION INTRAMUSCULAR; INTRAVENOUS; SUBCUTANEOUS EVERY 5 MIN PRN
Status: DISCONTINUED | OUTPATIENT
Start: 2023-01-04 | End: 2023-01-04

## 2023-01-04 RX ORDER — METOCLOPRAMIDE HYDROCHLORIDE 5 MG/ML
10 INJECTION INTRAMUSCULAR; INTRAVENOUS EVERY 6 HOURS PRN
Status: DISCONTINUED | OUTPATIENT
Start: 2023-01-04 | End: 2023-01-04

## 2023-01-04 RX ORDER — HYDROMORPHONE HYDROCHLORIDE 1 MG/ML
0.6 INJECTION, SOLUTION INTRAMUSCULAR; INTRAVENOUS; SUBCUTANEOUS EVERY 5 MIN PRN
Status: DISCONTINUED | OUTPATIENT
Start: 2023-01-04 | End: 2023-01-04

## 2023-01-04 RX ORDER — HYDROMORPHONE HYDROCHLORIDE 1 MG/ML
0.2 INJECTION, SOLUTION INTRAMUSCULAR; INTRAVENOUS; SUBCUTANEOUS EVERY 5 MIN PRN
Status: DISCONTINUED | OUTPATIENT
Start: 2023-01-04 | End: 2023-01-04

## 2023-01-04 RX ORDER — MEPERIDINE HYDROCHLORIDE 25 MG/ML
12.5 INJECTION INTRAMUSCULAR; INTRAVENOUS; SUBCUTANEOUS AS NEEDED
Status: DISCONTINUED | OUTPATIENT
Start: 2023-01-04 | End: 2023-01-04

## 2023-01-04 RX ORDER — HYDROCODONE BITARTRATE AND ACETAMINOPHEN 5; 325 MG/1; MG/1
1 TABLET ORAL ONCE AS NEEDED
Status: DISCONTINUED | OUTPATIENT
Start: 2023-01-04 | End: 2023-01-04

## 2023-01-04 RX ORDER — NICOTINE POLACRILEX 4 MG
30 LOZENGE BUCCAL
Status: DISCONTINUED | OUTPATIENT
Start: 2023-01-04 | End: 2023-01-04

## 2023-01-04 RX ORDER — HYDROCODONE BITARTRATE AND ACETAMINOPHEN 5; 325 MG/1; MG/1
2 TABLET ORAL ONCE AS NEEDED
Status: DISCONTINUED | OUTPATIENT
Start: 2023-01-04 | End: 2023-01-04

## 2023-01-04 RX ORDER — LIDOCAINE HYDROCHLORIDE 5 MG/ML
INJECTION, SOLUTION INFILTRATION; INTRAVENOUS AS NEEDED
Status: DISCONTINUED | OUTPATIENT
Start: 2023-01-04 | End: 2023-01-04 | Stop reason: SURG

## 2023-01-04 RX ORDER — LABETALOL HYDROCHLORIDE 5 MG/ML
5 INJECTION, SOLUTION INTRAVENOUS EVERY 5 MIN PRN
Status: DISCONTINUED | OUTPATIENT
Start: 2023-01-04 | End: 2023-01-04

## 2023-01-04 RX ORDER — BUPIVACAINE HYDROCHLORIDE 5 MG/ML
INJECTION, SOLUTION EPIDURAL; INTRACAUDAL AS NEEDED
Status: DISCONTINUED | OUTPATIENT
Start: 2023-01-04 | End: 2023-01-04 | Stop reason: HOSPADM

## 2023-01-04 RX ORDER — DEXTROSE MONOHYDRATE 25 G/50ML
50 INJECTION, SOLUTION INTRAVENOUS
Status: DISCONTINUED | OUTPATIENT
Start: 2023-01-04 | End: 2023-01-04

## 2023-01-04 RX ORDER — SODIUM CHLORIDE, SODIUM LACTATE, POTASSIUM CHLORIDE, CALCIUM CHLORIDE 600; 310; 30; 20 MG/100ML; MG/100ML; MG/100ML; MG/100ML
INJECTION, SOLUTION INTRAVENOUS CONTINUOUS
Status: DISCONTINUED | OUTPATIENT
Start: 2023-01-04 | End: 2023-01-04

## 2023-01-04 RX ORDER — NICOTINE POLACRILEX 4 MG
15 LOZENGE BUCCAL
Status: DISCONTINUED | OUTPATIENT
Start: 2023-01-04 | End: 2023-01-04

## 2023-01-04 RX ORDER — ACETAMINOPHEN 500 MG
1000 TABLET ORAL ONCE
Status: DISCONTINUED | OUTPATIENT
Start: 2023-01-04 | End: 2023-01-04 | Stop reason: HOSPADM

## 2023-01-04 RX ORDER — CEFAZOLIN SODIUM IN 0.9 % NACL 3 G/100 ML
3 INTRAVENOUS SOLUTION, PIGGYBACK (ML) INTRAVENOUS ONCE
Status: DISCONTINUED | OUTPATIENT
Start: 2023-01-04 | End: 2023-01-04 | Stop reason: HOSPADM

## 2023-01-04 RX ORDER — PROCHLORPERAZINE EDISYLATE 5 MG/ML
5 INJECTION INTRAMUSCULAR; INTRAVENOUS EVERY 8 HOURS PRN
Status: DISCONTINUED | OUTPATIENT
Start: 2023-01-04 | End: 2023-01-04

## 2023-01-04 RX ORDER — ONDANSETRON 4 MG/1
4 TABLET, FILM COATED ORAL EVERY 8 HOURS PRN
Qty: 10 TABLET | Refills: 0 | Status: SHIPPED | OUTPATIENT
Start: 2023-01-04

## 2023-01-04 RX ORDER — ONDANSETRON 2 MG/ML
4 INJECTION INTRAMUSCULAR; INTRAVENOUS EVERY 6 HOURS PRN
Status: DISCONTINUED | OUTPATIENT
Start: 2023-01-04 | End: 2023-01-04

## 2023-01-04 RX ORDER — MIDAZOLAM HYDROCHLORIDE 1 MG/ML
INJECTION INTRAMUSCULAR; INTRAVENOUS AS NEEDED
Status: DISCONTINUED | OUTPATIENT
Start: 2023-01-04 | End: 2023-01-04 | Stop reason: SURG

## 2023-01-04 RX ORDER — NALOXONE HYDROCHLORIDE 0.4 MG/ML
80 INJECTION, SOLUTION INTRAMUSCULAR; INTRAVENOUS; SUBCUTANEOUS AS NEEDED
Status: DISCONTINUED | OUTPATIENT
Start: 2023-01-04 | End: 2023-01-04

## 2023-01-04 RX ORDER — KETOROLAC TROMETHAMINE 30 MG/ML
INJECTION, SOLUTION INTRAMUSCULAR; INTRAVENOUS AS NEEDED
Status: DISCONTINUED | OUTPATIENT
Start: 2023-01-04 | End: 2023-01-04 | Stop reason: SURG

## 2023-01-04 RX ORDER — TRAMADOL HYDROCHLORIDE 50 MG/1
50 TABLET ORAL EVERY 6 HOURS PRN
Qty: 10 TABLET | Refills: 0 | Status: SHIPPED | OUTPATIENT
Start: 2023-01-04

## 2023-01-04 RX ORDER — SCOLOPAMINE TRANSDERMAL SYSTEM 1 MG/1
1 PATCH, EXTENDED RELEASE TRANSDERMAL ONCE
Status: DISCONTINUED | OUTPATIENT
Start: 2023-01-04 | End: 2023-01-04 | Stop reason: HOSPADM

## 2023-01-04 RX ORDER — ACETAMINOPHEN 500 MG
1000 TABLET ORAL ONCE AS NEEDED
Status: DISCONTINUED | OUTPATIENT
Start: 2023-01-04 | End: 2023-01-04

## 2023-01-04 RX ADMIN — MIDAZOLAM HYDROCHLORIDE 2 MG: 1 INJECTION INTRAMUSCULAR; INTRAVENOUS at 10:45:00

## 2023-01-04 RX ADMIN — ONDANSETRON 4 MG: 2 INJECTION INTRAMUSCULAR; INTRAVENOUS at 10:43:00

## 2023-01-04 RX ADMIN — SODIUM CHLORIDE, SODIUM LACTATE, POTASSIUM CHLORIDE, CALCIUM CHLORIDE: 600; 310; 30; 20 INJECTION, SOLUTION INTRAVENOUS at 10:40:00

## 2023-01-04 RX ADMIN — KETOROLAC TROMETHAMINE 30 MG: 30 INJECTION, SOLUTION INTRAMUSCULAR; INTRAVENOUS at 10:42:00

## 2023-01-04 RX ADMIN — LIDOCAINE HYDROCHLORIDE 50 ML: 5 INJECTION, SOLUTION INFILTRATION; INTRAVENOUS at 10:42:00

## 2023-01-04 NOTE — INTERVAL H&P NOTE
Pre-op Diagnosis: Carpal tunnel syndrome of right wrist [G56.01]    The above referenced H&P was reviewed by MANUELA Kaye on 1/4/2023, the patient was examined and no significant changes have occurred in the patient's condition since the H&P was performed. I discussed with the patient and/or legal representative the potential benefits, risks and side effects of this procedure; the likelihood of the patient achieving goals; and potential problems that might occur during recuperation. I discussed reasonable alternatives to the procedure, including risks, benefits and side effects related to the alternatives and risks related to not receiving this procedure. We will proceed with procedure as planned.

## 2023-01-04 NOTE — OPERATIVE REPORT
Operative Note    Patient Name: Poppy Granados    Preoperative Diagnosis:     1. Carpal tunnel syndrome of right wrist [G56.01]    Postoperative Diagnosis:      1. Carpal tunnel syndrome of right wrist [G56.01]    Surgeon(s) and Role:     Long Rojas MD - Primary     Assistant: Roseanne Denis PA-C    A PA was needed for the successful completion of this case. She was essential for the proper positioning of patient, manipulation of instruments, proper exposure, manipulation of soft tissue, and wound closure. Procedures:     1. Right endoscopic carpal tunnel release (97692)    Antibiotics: Ancef 2g    Surgical Findings: Normal Anatomy     Anesthesia: Fred Block    Complications: None    Implants: None    Specimen: None    Condition: Stable    Estimated Blood Loss: 1mL    Indications:  50year old female with EMG/NCV confirmed right carpal tunnel syndrome that failed non-surgical management. Patient consented to an endoscopic carpal tunnel release possible open having understood the risks associated with surgery, expected outcome, time to recovery and the need for possible rehab. Risks that were discussed but not limited to infection, nerve injury, tendon injury, artery injury, need for additional surgery, and no improvements of present symptoms. Procedure:  Patient was met in the preoperative holding area where consent was verified, laterality was marked with the surgeon's initials, and the H&P was updated. Patient was brought to the operating room on a transport cart. Patient was then transferred onto the operating room table and placed in supine position with an arm table and with bony prominences well-padded. SCDs were placed. Antibiotics were fully infused. An upper arm tourniquet was placed and the limb was exsanguinated using Esmarch bandage. Tourniquet was raised to 250mmHg. A fred block was subsequently performed. The arm was then prepped and draped in the usual sterile fashion.  A surgical timeout was performed. A transverse incision through the dermis was made 5mm proximal to the distal volar wrist crease just ulnar to the palmaris longus using a 15 blade. Adson's and Logan Susy scissors was used to dissect through the subcutaneous tissue onto the antebrachial fascia. A distally based 1 cm wide rectangular flap was elevated using a Arapahoe blade. The flap was retracted distally and volarly allowing access to the carpal tunnel. The undersurface of the transverse carpal ligament was accessed, cleaned, and dilated. After assessing the width of the ligament, the microaire endoscopic apparatus with camera was inserted into the carpal tunnel. Under camera magnification with direct visualization of the undersurface of the transverse carpal ligament, the blade was engaged at the distal extents of the ligament and the release of the ligament was carried out distal to proximal.  I verified the release with the endoscopy camera noting divergent edges of the transverse carpal ligament. I also physically verified with a tenosynovium elevator complete release of the transverse carpal ligament. The tourniquet was then lowered and bipolar cautery was used to achieve hemostasis. Closure: The incision was closed using 4-0 Monocryl in a buried simple interrupted fashion. Exofin skin glue was applied and Steri-Strips were placed. Dressing/Splint:  Tegaderm with a pad was applied over the endoscopic carpal tunnel incision. Post Operative:  Patient was woken up from anesthesia and taken to PACU for further recovery and discharge home. Parmjit Cabral MD  Hand, Wrist, & Elbow Surgery  [facility]  [facility] [address], [address] [facility], [address] [place] [address]  [email or web address]  t: [id number]  f: [phone]

## 2023-01-16 ENCOUNTER — OFFICE VISIT (OUTPATIENT)
Dept: ORTHOPEDICS CLINIC | Facility: CLINIC | Age: 48
End: 2023-01-16
Payer: COMMERCIAL

## 2023-01-16 VITALS — BODY MASS INDEX: 45.45 KG/M2 | HEIGHT: 67.5 IN | WEIGHT: 293 LBS

## 2023-01-16 DIAGNOSIS — G56.01 CARPAL TUNNEL SYNDROME OF RIGHT WRIST: Primary | ICD-10-CM

## 2023-01-16 PROCEDURE — 3008F BODY MASS INDEX DOCD: CPT | Performed by: PHYSICIAN ASSISTANT

## 2023-01-16 PROCEDURE — 99024 POSTOP FOLLOW-UP VISIT: CPT | Performed by: PHYSICIAN ASSISTANT

## 2023-01-16 RX ORDER — IBUPROFEN 600 MG/1
600 TABLET ORAL EVERY 6 HOURS PRN
COMMUNITY
Start: 2023-01-13

## 2023-01-16 RX ORDER — AMOXICILLIN 500 MG/1
500 CAPSULE ORAL 3 TIMES DAILY
COMMUNITY
Start: 2023-01-13

## 2023-01-16 RX ORDER — CHLORHEXIDINE GLUCONATE 0.12 MG/ML
RINSE ORAL
COMMUNITY
Start: 2023-01-13

## 2023-02-20 ENCOUNTER — OFFICE VISIT (OUTPATIENT)
Dept: ORTHOPEDICS CLINIC | Facility: CLINIC | Age: 48
End: 2023-02-20
Payer: COMMERCIAL

## 2023-02-20 VITALS — WEIGHT: 293 LBS | HEIGHT: 67.5 IN | BODY MASS INDEX: 45.45 KG/M2

## 2023-02-20 DIAGNOSIS — G56.03 BILATERAL CARPAL TUNNEL SYNDROME: Primary | ICD-10-CM

## 2023-02-20 PROCEDURE — 99024 POSTOP FOLLOW-UP VISIT: CPT | Performed by: PHYSICIAN ASSISTANT

## 2023-02-20 PROCEDURE — 3008F BODY MASS INDEX DOCD: CPT | Performed by: PHYSICIAN ASSISTANT

## 2023-03-23 DIAGNOSIS — K21.9 GASTROESOPHAGEAL REFLUX DISEASE, UNSPECIFIED WHETHER ESOPHAGITIS PRESENT: ICD-10-CM

## 2023-03-23 DIAGNOSIS — R20.0 NUMBNESS AND TINGLING IN RIGHT HAND: ICD-10-CM

## 2023-03-23 DIAGNOSIS — R74.8 ELEVATED CK: ICD-10-CM

## 2023-03-23 DIAGNOSIS — M10.09 IDIOPATHIC GOUT OF MULTIPLE SITES, UNSPECIFIED CHRONICITY: ICD-10-CM

## 2023-03-23 DIAGNOSIS — G89.29 CHRONIC MUSCULOSKELETAL PAIN: ICD-10-CM

## 2023-03-23 DIAGNOSIS — E11.42 DIABETIC POLYNEUROPATHY ASSOCIATED WITH TYPE 2 DIABETES MELLITUS (HCC): ICD-10-CM

## 2023-03-23 DIAGNOSIS — R20.2 NUMBNESS AND TINGLING IN RIGHT HAND: ICD-10-CM

## 2023-03-23 DIAGNOSIS — Z51.81 THERAPEUTIC DRUG MONITORING: ICD-10-CM

## 2023-03-23 DIAGNOSIS — M79.18 CHRONIC MUSCULOSKELETAL PAIN: ICD-10-CM

## 2023-03-23 DIAGNOSIS — R74.01 ELEVATED ALT MEASUREMENT: ICD-10-CM

## 2023-03-23 RX ORDER — PANTOPRAZOLE SODIUM 40 MG/1
40 TABLET, DELAYED RELEASE ORAL
Qty: 90 TABLET | Refills: 1 | Status: SHIPPED | OUTPATIENT
Start: 2023-03-23

## 2023-03-23 RX ORDER — LOSARTAN POTASSIUM 25 MG/1
25 TABLET ORAL DAILY
Qty: 90 TABLET | Refills: 1 | Status: SHIPPED | OUTPATIENT
Start: 2023-03-23

## 2023-03-23 RX ORDER — MELOXICAM 15 MG/1
15 TABLET ORAL DAILY
Qty: 30 TABLET | Refills: 2 | OUTPATIENT
Start: 2023-03-23

## 2023-03-23 RX ORDER — ALLOPURINOL 300 MG/1
300 TABLET ORAL DAILY
Qty: 90 TABLET | Refills: 3 | Status: SHIPPED | OUTPATIENT
Start: 2023-03-23

## 2023-03-23 NOTE — TELEPHONE ENCOUNTER
Future Appointments   Date Time Provider Stacia Edwards   3/30/2023  4:00 PM Amara Morales MD [facility]  11/8/2022    Last refill  12/15/2022  90 tabs, 3 refills

## 2023-03-30 ENCOUNTER — TELEMEDICINE (OUTPATIENT)
Dept: RHEUMATOLOGY | Facility: CLINIC | Age: 48
End: 2023-03-30
Payer: COMMERCIAL

## 2023-03-30 VITALS — BODY MASS INDEX: 57.52 KG/M2 | HEIGHT: 60 IN | WEIGHT: 293 LBS

## 2023-03-30 DIAGNOSIS — R20.2 NUMBNESS AND TINGLING IN RIGHT HAND: ICD-10-CM

## 2023-03-30 DIAGNOSIS — R74.8 ELEVATED CK: Primary | ICD-10-CM

## 2023-03-30 DIAGNOSIS — E83.42 HYPOMAGNESEMIA: ICD-10-CM

## 2023-03-30 DIAGNOSIS — G89.29 CHRONIC MUSCULOSKELETAL PAIN: ICD-10-CM

## 2023-03-30 DIAGNOSIS — M79.18 CHRONIC MUSCULOSKELETAL PAIN: ICD-10-CM

## 2023-03-30 DIAGNOSIS — E11.42 DIABETIC POLYNEUROPATHY ASSOCIATED WITH TYPE 2 DIABETES MELLITUS (HCC): ICD-10-CM

## 2023-03-30 DIAGNOSIS — R20.0 NUMBNESS AND TINGLING IN RIGHT HAND: ICD-10-CM

## 2023-03-30 DIAGNOSIS — M10.09 IDIOPATHIC GOUT OF MULTIPLE SITES, UNSPECIFIED CHRONICITY: ICD-10-CM

## 2023-03-30 PROCEDURE — 99214 OFFICE O/P EST MOD 30 MIN: CPT | Performed by: INTERNAL MEDICINE

## 2023-03-30 RX ORDER — DULOXETIN HYDROCHLORIDE 30 MG/1
2 CAPSULE, DELAYED RELEASE ORAL DAILY
COMMUNITY
Start: 2023-03-05

## 2023-03-30 RX ORDER — DULOXETIN HYDROCHLORIDE 30 MG/1
60 CAPSULE, DELAYED RELEASE ORAL DAILY
Qty: 180 CAPSULE | Refills: 1 | Status: SHIPPED | OUTPATIENT
Start: 2023-03-30

## 2023-03-30 NOTE — PROGRESS NOTES
Rheumatology Follow-up Patient Note  =====================================================================================================  ? Chief Complaint:   Gout, right lower extremity swelling, chronic arthralgia    Patient presents with:  Gout: 4 month follow up. She states she is doing fairly well. She just has some swelling every now and then to the top of her rt foot. PCP  Kennedi Cooley MD  Fax: [phone]  Phone: [phone]  =====================================================================================================  HPI    Faisal Esquivel is a 50year old female     ==============================================================================================================    Visit: 07/21/21  Doing better after pred taper and anakinra 100 mg daily x 3 days. Currently on pred 20 mg. Colchicine 0.6 mg daily  Was constipated. Taking stool softners  SF was negative for crystals and septic joint. Ankle and foot much better; 80% improvement  ==============================================================================================================  Visit: 11/24/21  Since [facility], had a couple of gout flares. She took prednisone for this which helped. Recent URI with cough. Will be put on medrol dose pack, put on Z-pack. Birthday and 21rd anniversary appointment today. Meds  0.6 mg of colchicine  Allopurinol 100 mg daily (didn't increase)    ==============================================================================================================  Visit: 06/01/22  Right ankle/mid foot pain worsening since 1.5 months. There has been no significant improvement or worsening during this time. No recent travel or trauma. Tried prednisone, naproxen without success. Took prednisone 40 mg daily x 5 days for angioedema's during this period of time; the prednisone had no impact on her right foot.   She also tried a separate prednisone 30 mg taper over 10 days without improvement worse in the evening. Tried different orthotics without improvement. Remains on allopurinol 200 mg daily as well as colchicine 0.6 mg daily  ==============================================================================================================  7/2022:  Video visit today given new issues. R ankle/foot swelling noted still. Toes would swell. Compression socks do not help. Prednisone 60 mg daily did not help. Left foot started to swell again. However this does not feel like gout. Right index finger is numb now. R carpal tunnel syndrome noted in 2017 on EMG/NCS. Right foot is still an issue with swelling and pain and sensitivity. Taking gabapentin 300 mg nightly without significant benefit to her neuropathic symptoms  Still taking allopurinol 200 mg daily  She stopped colchicine  ==============================================================================================================  Visit: 11/08/22 Sept 2022: hospitalized with Covid JESSICA, she notes she was given remdesivir and also antibiotics for secondary bacterial pneumonia. Since then she has had more pain. Right elbow/forearm pain has developed with paresthesias rating down the dorsum of the hand. Stopped gabapentin 1 month ago since there is no improvement in neuropathy. There is no worsening with stopping the medication. No gout flares  Lasix is helping with lower extremity swelling significantly. However the right foot is still fairly swollen. Medications:  Allopurinol 200 mg daily  Colchicine 0.6 mg daily  ==============================================================================================================  Today's Visit: 03/30/23    S/p R carpal tunnel syndrome surgery in Jan 2023. Some residual pain. But overall doing well afterwards. No recent gout flares. Now is off colchicine. On allopurinol 300 mg daily. Cymbalta is helping with neuropathy and chronic musculoskeletal pain.     R foot swelling is on and off. Off furosemide. -off colchicine    Medications:  Allopurinol 300 mg   meloxicam 15 mg prn with food   cymbalta 60 mg daily  Tramadol 50 mg prn    5 point ROS negative except noted above  I had reviewed past medical and family histories together with allergy and medication lists documented. Medications:  DULoxetine 30 MG Oral Cap DR Particles, 2 capsules (60 mg total) daily. , Disp: , Rfl:   DULoxetine (CYMBALTA) 30 MG Oral Cap DR Particles, Take 2 capsules (60 mg total) by mouth daily. , Disp: 180 capsule, Rfl: 1  pantoprazole (PROTONIX) 40 MG Oral Tab EC, Take 1 tablet (40 mg total) by mouth every morning before breakfast., Disp: 90 tablet, Rfl: 1  losartan 25 MG Oral Tab, Take 1 tablet (25 mg total) by mouth daily. , Disp: 90 tablet, Rfl: 1  allopurinol 300 MG Oral Tab, Take 1 tablet (300 mg total) by mouth daily. , Disp: 90 tablet, Rfl: 3  traMADol 50 MG Oral Tab, Take 1 tablet (50 mg total) by mouth every 6 (six) hours as needed for Pain., Disp: 10 tablet, Rfl: 0  ondansetron (ZOFRAN) 4 mg tablet, Take 1 tablet (4 mg total) by mouth every 8 (eight) hours as needed for Nausea., Disp: 10 tablet, Rfl: 0  Phentermine HCl 37.5 MG Oral Cap, Take 1 capsule (37.5 mg total) by mouth every morning. Anpt takes 1/2 tab ( 18.75) in the afternoon, Disp: , Rfl:   Meloxicam 15 MG Oral Tab, Take 1 tablet (15 mg total) by mouth in the morning., Disp: 30 tablet, Rfl: 2  cyanocobalamin 1000 MCG Oral Tab, Take 1 tablet (1,000 mcg total) by mouth daily. , Disp: , Rfl:   ACZONE 7.5 % External Gel, , Disp: , Rfl:   atorvastatin 20 MG Oral Tab, Take 1 tablet (20 mg total) by mouth nightly., Disp: 90 tablet, Rfl: 2  Albuterol Sulfate HFA (PROAIR HFA) 108 (90 Base) MCG/ACT Inhalation Aero Soln, Inhale 2 puffs into the lungs every 6 (six) hours as needed for Wheezing., Disp: 1 Inhaler, Rfl: 2  aspirin 81 MG Oral Tab EC, Take 1 tablet (81 mg total) by mouth daily. , Disp: , Rfl:   Multiple Vitamin (MULTI-VITAMIN DAILY OR), Take 1 tablet by mouth daily. , Disp: , Rfl:       Modified Medications    No medications on file     Medications Discontinued During This Encounter  amoxicillin 500 MG Oral Cap            chlorhexidine gluconate 0.12 % Mouth*  ergocalciferol 1.25 MG (71650 UT) Or*  ibuprofen 600 MG Oral Tab              Meloxicam 15 MG Oral Tab               ondansetron 4 MG Oral Tablet Dispers*  ? Allergies:    Darvocet [Propoxyph*    HIVES    Comment:N 50 TABS  Demerol                 HIVES    Comment:TABS  Grape                   ANAPHYLAXIS  Hydrocodone-Acetami*    OTHER (SEE COMMENTS)    Comment:ASA TABS             ASA TABS  Lortab                  HIVES    Comment:ASA TABS  Morphine                HIVES    Comment:Derivatives  Propoxyphene            OTHER (SEE COMMENTS)    Comment:N 50 TABS             N 50 TABS  Metformin               DIARRHEA      Objective     03/30/23  1605   Weight: (!) 320 lb (145.2 kg)   Height: 5' (1.524 m)       GEN: NAD, well-nourished. HEENT: Head: NCAT. Face: No lesions. Eyes: Conjunctiva clear. PULM:  easy effort  Extremities: No cyanosis, edema or deformities. Neurologic: Strength, CN2-12 grossly intact   Psych: normal affect. Skin: No lesions or rashes. MSK: 28 joint count performed. No evidence of synovitis in mcp, pip, dip, wrist, elbows, shoulders, hips, knees, ankles, mtp unless otherwise noted. Full ROM of elbows, wrists, knees. Labs:     Additional Labs:    7/2022:  RF/CCP neg    7/2021  [id number] neg     Latest Reference Range & Units 05/28/20 11:24 01/27/21 14:20 07/07/21 22:32 07/29/21 07:34 07/05/22 08:23 12/13/22 08:22 12/30/22 07:47   URIC ACID 2.6 - 6.0 mg/dL 8.3 (H) 9.1 (H) 6.9 (H) 7.3 (H) 4.5 6.8 (H) 6.1 (H)   (H): Data is abnormally high    Radiology:    Radiology review:      =====================================================================================================  Assessment and Plan    Assessment:  Elevated CK  (primary encounter diagnosis)  Idiopathic gout of multiple sites, unspecified chronicity  Diabetic polyneuropathy associated with type 2 diabetes mellitus (HCC)  Numbness and tingling in right hand  Hypomagnesemia  Chronic musculoskeletal pain    #Gout: Last serum urate was 6.1, slightly above goal.  Hx of left podagra, left ankle swelling, diffuse left foot swelling, left Achilles tendinitis with probable involvement of the right MTPs as well.   -No recent flares    #Hypokalemia, hypomagnesemia, hyperCKemia: Noted in December 2022. Unclear cause. -Needs to recheck    #Diabetic neuropathy, chronic musculoskeletal pain: Improved with Cymbalta    #Chronic bilateral right greater than left lower extremity swelling: Improving with Lasix. Now off Lasix and has not needed this for quite some time     High risk medication labs including CMP and CBC w/ diff reviewed from 9/2021; creatinine 0.82. results are stable. [id number] neg    Plan:  -Continue allopurinol 300 mg daily  -Continue meloxicam 15 mg daily as needed. Don't take ibuprofen, naproxen, voltaren the same day, they are in the same family.  -Continue duloxetine 30 mg tablet: Take 2 tablets daily to equal 60 mg daily  -Obtain monitoring blood work for both gout/hyperuricemia as well as hypokalemia/hypomagnesemia, elevated CK that was noted in December.  -rtc in 5 months      Called patient via number listed in chart today    Original appnt date: Today's Visit: 03/30/23    This video telehealth visit (with [address]) was conducted in lieu of a previously scheduled clinic visit because of the COVID-19 pandemic. The patient or patient guardian verbally consented to virtual/remote treatment. All medical decision making was made in conjunction with the patient. This telehealth visit was initiated by the patient or patient guardian given the in-person appointment time as above who was located at [car]. The patient presented alone.  Risks and benefits of therapy recommended, and potential side effects of all medications prescribed were discussed. Patient was counseled on symptoms that would necessitate more emergency follow up. The patient was within the state of [place] during our visit. Patient understands and accepts financial responsibility for any deductible, coinsurance and/or co-pays associated with the service. The patient understands that the physical exam will be limited. This telehealth visit was performed while I was physically located in a   Medical office at [place] [address], [facility] [address] [place] [address]      Diagnoses and all orders for this visit:    Elevated CK  -     COMP METABOLIC PANEL (14); Future  -     URIC ACID; Future  -     CK CREATINE KINASE (NOT CREATININE); Future  -     MAGNESIUM; Future  -     DULoxetine (CYMBALTA) 30 MG Oral Cap DR Particles; Take 2 capsules (60 mg total) by mouth daily. Idiopathic gout of multiple sites, unspecified chronicity  -     COMP METABOLIC PANEL (14); Future  -     URIC ACID; Future  -     CK CREATINE KINASE (NOT CREATININE); Future  -     MAGNESIUM; Future  -     DULoxetine (CYMBALTA) 30 MG Oral Cap DR Particles; Take 2 capsules (60 mg total) by mouth daily. Diabetic polyneuropathy associated with type 2 diabetes mellitus (HCC)  -     COMP METABOLIC PANEL (14); Future  -     URIC ACID; Future  -     CK CREATINE KINASE (NOT CREATININE); Future  -     MAGNESIUM; Future  -     DULoxetine (CYMBALTA) 30 MG Oral Cap DR Particles; Take 2 capsules (60 mg total) by mouth daily. Numbness and tingling in right hand  -     COMP METABOLIC PANEL (14); Future  -     URIC ACID; Future  -     CK CREATINE KINASE (NOT CREATININE); Future  -     MAGNESIUM; Future  -     DULoxetine (CYMBALTA) 30 MG Oral Cap DR Particles; Take 2 capsules (60 mg total) by mouth daily. Hypomagnesemia  -     COMP METABOLIC PANEL (14); Future  -     URIC ACID; Future  -     CK CREATINE KINASE (NOT CREATININE); Future  -     MAGNESIUM;  Future  -     DULoxetine (CYMBALTA) 30 MG Oral Cap DR Particles; Take 2 capsules (60 mg total) by mouth daily. Chronic musculoskeletal pain  -     DULoxetine (CYMBALTA) 30 MG Oral Cap DR Particles; Take 2 capsules (60 mg total) by mouth daily. Return in about 20 weeks (around 8/17/2023). The above plan of care, diagnosis, orders, and follow-up were discussed with the patient. Questions related to this recommended plan of care were answered. Thank you for referring this delightful patient to me. Please feel free to contact me with any questions. This report was performed utilizing speech recognition software technology. Despite proofreading, speech recognition errors could escape detection. If a word or phrase is confusing or out of context, please do not hesitate to call for   clarification.        Kind regards      Eduard Bautista MD  [facility]

## 2023-03-30 NOTE — PATIENT INSTRUCTIONS
-Continue allopurinol 300 mg daily  -Continue meloxicam 15 mg daily as needed. Don't take ibuprofen, naproxen, voltaren the same day, they are in the same family.  -Continue duloxetine 30 mg tablet: Take 2 tablets daily to equal 60 mg daily  -Obtain monitoring blood work for both gout/hyperuricemia as well as hypokalemia/hypomagnesemia, elevated CK that was noted in December.

## 2023-04-03 ENCOUNTER — TELEPHONE (OUTPATIENT)
Dept: RHEUMATOLOGY | Facility: CLINIC | Age: 48
End: 2023-04-03

## 2023-04-15 ENCOUNTER — PATIENT MESSAGE (OUTPATIENT)
Dept: FAMILY MEDICINE CLINIC | Facility: CLINIC | Age: 48
End: 2023-04-15

## 2023-04-15 DIAGNOSIS — R51.9 INTRACTABLE HEADACHE, UNSPECIFIED CHRONICITY PATTERN, UNSPECIFIED HEADACHE TYPE: Primary | ICD-10-CM

## 2023-04-18 ENCOUNTER — TELEPHONE (OUTPATIENT)
Dept: FAMILY MEDICINE CLINIC | Facility: CLINIC | Age: 48
End: 2023-04-18

## 2023-04-18 NOTE — TELEPHONE ENCOUNTER
Received FMLA forms.  Patient was advised to come in and fill out NINI and pay 25.00 but she has not     Sent email with forms to forms department and sent originals via inter office

## 2023-04-19 NOTE — TELEPHONE ENCOUNTER
Resent forms as patient came to fill out a few items, she completed the NINI and also paid the 25.00    Put originals in interoffice envelope and routed

## 2023-04-21 NOTE — TELEPHONE ENCOUNTER
FMLA forms received and logged for processing. My Chart message sent for Auth / Mike Maldonado (did not see one on file).

## 2023-04-24 ENCOUNTER — TELEMEDICINE (OUTPATIENT)
Dept: FAMILY MEDICINE CLINIC | Facility: CLINIC | Age: 48
End: 2023-04-24

## 2023-04-24 DIAGNOSIS — R42 DIZZINESS: ICD-10-CM

## 2023-04-24 DIAGNOSIS — G43.711 INTRACTABLE CHRONIC MIGRAINE WITHOUT AURA AND WITH STATUS MIGRAINOSUS: Primary | ICD-10-CM

## 2023-04-24 DIAGNOSIS — R41.3 MEMORY PROBLEM: ICD-10-CM

## 2023-04-24 PROCEDURE — 99215 OFFICE O/P EST HI 40 MIN: CPT | Performed by: FAMILY MEDICINE

## 2023-04-24 NOTE — TELEPHONE ENCOUNTER
FMLA paperwork completed by Dr. John Borrego during phone visit today. Original placed at  for pt pick-up. Copy sent to scan and copy kept in back. Ubrelvy samples also placed at front.

## 2023-04-25 ENCOUNTER — TELEPHONE (OUTPATIENT)
Dept: FAMILY MEDICINE CLINIC | Facility: CLINIC | Age: 48
End: 2023-04-25

## 2023-04-25 NOTE — TELEPHONE ENCOUNTER
Pt picked up medication that was prescribed during appointment 4/24/23. Bag found in back cabinet with pt name on it.   ID verfiied

## 2023-05-08 ENCOUNTER — OFFICE VISIT (OUTPATIENT)
Dept: NEUROLOGY | Facility: CLINIC | Age: 48
End: 2023-05-08
Payer: COMMERCIAL

## 2023-05-08 VITALS — SYSTOLIC BLOOD PRESSURE: 138 MMHG | HEART RATE: 88 BPM | DIASTOLIC BLOOD PRESSURE: 80 MMHG | RESPIRATION RATE: 16 BRPM

## 2023-05-08 DIAGNOSIS — G43.711 INTRACTABLE CHRONIC MIGRAINE WITHOUT AURA AND WITH STATUS MIGRAINOSUS: Primary | ICD-10-CM

## 2023-05-08 RX ORDER — RIMEGEPANT SULFATE 75 MG/75MG
75 TABLET, ORALLY DISINTEGRATING ORAL AS NEEDED
Qty: 8 TABLET | Refills: 0 | Status: SHIPPED | OUTPATIENT
Start: 2023-05-08 | End: 2024-05-07

## 2023-05-08 RX ORDER — TOPIRAMATE 25 MG/1
TABLET ORAL
Qty: 180 TABLET | Refills: 3 | Status: SHIPPED | OUTPATIENT
Start: 2023-05-08

## 2023-05-08 NOTE — PROGRESS NOTES
Patient here for evaluation of migraines. Will also have dizziness and ringing in ears.  Migraines have been worse over the last few months, nausea/vomiting

## 2023-05-23 ENCOUNTER — HOSPITAL ENCOUNTER (OUTPATIENT)
Dept: MRI IMAGING | Facility: HOSPITAL | Age: 48
Discharge: HOME | End: 2023-05-23
Attending: Other
Payer: COMMERCIAL

## 2023-05-23 DIAGNOSIS — G43.711 INTRACTABLE CHRONIC MIGRAINE WITHOUT AURA AND WITH STATUS MIGRAINOSUS: ICD-10-CM

## 2023-05-23 PROCEDURE — 70551 MRI BRAIN STEM W/O DYE: CPT | Performed by: OTHER

## 2023-08-09 ENCOUNTER — OFFICE VISIT (OUTPATIENT)
Dept: RHEUMATOLOGY | Facility: CLINIC | Age: 48
End: 2023-08-09
Payer: COMMERCIAL

## 2023-08-09 VITALS
RESPIRATION RATE: 16 BRPM | WEIGHT: 293 LBS | SYSTOLIC BLOOD PRESSURE: 130 MMHG | HEART RATE: 96 BPM | TEMPERATURE: 98 F | HEIGHT: 67.5 IN | OXYGEN SATURATION: 97 % | BODY MASS INDEX: 45.45 KG/M2 | DIASTOLIC BLOOD PRESSURE: 80 MMHG

## 2023-08-09 DIAGNOSIS — M10.041 ACUTE IDIOPATHIC GOUT OF RIGHT HAND: ICD-10-CM

## 2023-08-09 DIAGNOSIS — E11.42 DIABETIC POLYNEUROPATHY ASSOCIATED WITH TYPE 2 DIABETES MELLITUS (HCC): ICD-10-CM

## 2023-08-09 DIAGNOSIS — M79.18 CHRONIC MUSCULOSKELETAL PAIN: ICD-10-CM

## 2023-08-09 DIAGNOSIS — G89.29 CHRONIC MUSCULOSKELETAL PAIN: ICD-10-CM

## 2023-08-09 DIAGNOSIS — M10.09 IDIOPATHIC GOUT OF MULTIPLE SITES, UNSPECIFIED CHRONICITY: Primary | ICD-10-CM

## 2023-08-09 DIAGNOSIS — Z51.81 THERAPEUTIC DRUG MONITORING: ICD-10-CM

## 2023-08-09 DIAGNOSIS — I87.2 VENOUS INSUFFICIENCY: ICD-10-CM

## 2023-08-09 PROCEDURE — 3008F BODY MASS INDEX DOCD: CPT | Performed by: INTERNAL MEDICINE

## 2023-08-09 PROCEDURE — 99214 OFFICE O/P EST MOD 30 MIN: CPT | Performed by: INTERNAL MEDICINE

## 2023-08-09 PROCEDURE — 3079F DIAST BP 80-89 MM HG: CPT | Performed by: INTERNAL MEDICINE

## 2023-08-09 PROCEDURE — 3075F SYST BP GE 130 - 139MM HG: CPT | Performed by: INTERNAL MEDICINE

## 2023-08-09 RX ORDER — BUTALBITAL, ACETAMINOPHEN AND CAFFEINE 50; 325; 40 MG/1; MG/1; MG/1
TABLET ORAL
COMMUNITY
End: 2023-08-10 | Stop reason: ALTCHOICE

## 2023-08-09 RX ORDER — MECLIZINE HYDROCHLORIDE 25 MG/1
25 TABLET ORAL 3 TIMES DAILY PRN
COMMUNITY

## 2023-08-09 RX ORDER — MELATONIN
1 2 TIMES DAILY
COMMUNITY
Start: 2023-03-06

## 2023-08-09 RX ORDER — DULOXETIN HYDROCHLORIDE 60 MG/1
60 CAPSULE, DELAYED RELEASE ORAL DAILY
Qty: 90 CAPSULE | Refills: 1 | Status: SHIPPED | OUTPATIENT
Start: 2023-08-09 | End: 2024-02-05

## 2023-08-09 RX ORDER — FUROSEMIDE 20 MG/1
20 TABLET ORAL DAILY PRN
Qty: 30 TABLET | Refills: 0 | Status: SHIPPED | OUTPATIENT
Start: 2023-08-09

## 2023-08-09 RX ORDER — COLCHICINE 0.6 MG/1
0.6 TABLET ORAL 2 TIMES DAILY
Qty: 180 TABLET | Refills: 0 | Status: SHIPPED | OUTPATIENT
Start: 2023-08-09

## 2023-08-09 NOTE — PATIENT INSTRUCTIONS
Restart colchicine 0.6 mg twice daily for 2 weeks (then stop) and see how your finger does. -we could consider taking just 0.6 mg tablet daily in the future. Restart Lasix, 1 pill daily x 7 days and see how you do. Continue meloxicam daily as needed    Repeat blood work in 3 weeks.

## 2023-08-10 ENCOUNTER — OFFICE VISIT (OUTPATIENT)
Dept: NEUROLOGY | Facility: CLINIC | Age: 48
End: 2023-08-10
Payer: COMMERCIAL

## 2023-08-10 VITALS
WEIGHT: 293 LBS | SYSTOLIC BLOOD PRESSURE: 136 MMHG | RESPIRATION RATE: 16 BRPM | DIASTOLIC BLOOD PRESSURE: 82 MMHG | BODY MASS INDEX: 52 KG/M2 | HEART RATE: 82 BPM

## 2023-08-10 DIAGNOSIS — G43.711 INTRACTABLE CHRONIC MIGRAINE WITHOUT AURA AND WITH STATUS MIGRAINOSUS: Primary | ICD-10-CM

## 2023-08-10 PROCEDURE — 3075F SYST BP GE 130 - 139MM HG: CPT | Performed by: OTHER

## 2023-08-10 PROCEDURE — 99214 OFFICE O/P EST MOD 30 MIN: CPT | Performed by: OTHER

## 2023-08-10 PROCEDURE — 3079F DIAST BP 80-89 MM HG: CPT | Performed by: OTHER

## 2023-08-10 RX ORDER — RIMEGEPANT SULFATE 75 MG/75MG
75 TABLET, ORALLY DISINTEGRATING ORAL AS NEEDED
Qty: 8 TABLET | Refills: 3 | Status: SHIPPED | OUTPATIENT
Start: 2023-08-10 | End: 2024-08-09

## 2023-10-19 ENCOUNTER — TELEPHONE (OUTPATIENT)
Dept: FAMILY MEDICINE CLINIC | Facility: CLINIC | Age: 48
End: 2023-10-19

## 2023-11-09 DIAGNOSIS — G43.711 INTRACTABLE CHRONIC MIGRAINE WITHOUT AURA AND WITH STATUS MIGRAINOSUS: Primary | ICD-10-CM

## 2023-11-09 NOTE — TELEPHONE ENCOUNTER
Called pt to confirm how many she was taking no answer voicemail was full      Medication: TOPIRAMATE 25 MG Oral Tab     Date of last refill: 05/08/23 (180/3)  [place] 57/92[place]    Last office visit: 05/08/23  Due back to clinic per last office note:  6-9 months  Date next office visit scheduled:    Future Appointments   Date Time Provider Stacia Edwards   2/8/2024 10:00 AM Darin Heard MD [facility] [address]        Last OV note recommendation:     Plan:  MRI brain reviewed, normal  Cont current meds   Nurte prn  Headache diary  Migraine headache education given  See orders and medications filed with this encounter. The patient indicates understanding of these issues and agrees with the plan. Discussed with patient regarding assessment, work up, care plan and possible adverse and side effects of the medications.   RTC 6-9 months  Pt should go ER for any new or worsening symptoms and contact office

## 2023-11-15 ENCOUNTER — OFFICE VISIT (OUTPATIENT)
Dept: FAMILY MEDICINE CLINIC | Facility: CLINIC | Age: 48
End: 2023-11-15
Payer: COMMERCIAL

## 2023-11-15 VITALS
BODY MASS INDEX: 45.99 KG/M2 | OXYGEN SATURATION: 97 % | DIASTOLIC BLOOD PRESSURE: 78 MMHG | SYSTOLIC BLOOD PRESSURE: 132 MMHG | TEMPERATURE: 98 F | HEART RATE: 96 BPM | WEIGHT: 293 LBS | RESPIRATION RATE: 18 BRPM | HEIGHT: 67 IN

## 2023-11-15 DIAGNOSIS — R05.9 COUGH, UNSPECIFIED TYPE: Primary | ICD-10-CM

## 2023-11-15 PROCEDURE — 99213 OFFICE O/P EST LOW 20 MIN: CPT | Performed by: PHYSICIAN ASSISTANT

## 2023-11-15 PROCEDURE — 3075F SYST BP GE 130 - 139MM HG: CPT | Performed by: PHYSICIAN ASSISTANT

## 2023-11-15 PROCEDURE — 3078F DIAST BP <80 MM HG: CPT | Performed by: PHYSICIAN ASSISTANT

## 2023-11-15 PROCEDURE — 3008F BODY MASS INDEX DOCD: CPT | Performed by: PHYSICIAN ASSISTANT

## 2023-11-15 PROCEDURE — 87637 SARSCOV2&INF A&B&RSV AMP PRB: CPT | Performed by: PHYSICIAN ASSISTANT

## 2023-11-15 RX ORDER — BENZONATATE 200 MG/1
200 CAPSULE ORAL 3 TIMES DAILY PRN
Qty: 30 CAPSULE | Refills: 0 | Status: SHIPPED | OUTPATIENT
Start: 2023-11-15

## 2023-11-15 RX ORDER — ALBUTEROL SULFATE 90 UG/1
2 AEROSOL, METERED RESPIRATORY (INHALATION) EVERY 4 HOURS PRN
Qty: 1 EACH | Refills: 0 | Status: SHIPPED | OUTPATIENT
Start: 2023-11-15

## 2023-11-17 LAB
FLUAV + FLUBV RNA SPEC NAA+PROBE: DETECTED
FLUAV + FLUBV RNA SPEC NAA+PROBE: NOT DETECTED
RSV RNA SPEC NAA+PROBE: NOT DETECTED
SARS-COV-2 RNA RESP QL NAA+PROBE: NOT DETECTED

## 2023-11-17 RX ORDER — TOPIRAMATE 25 MG/1
75 TABLET ORAL 2 TIMES DAILY
Qty: 540 TABLET | Refills: 0 | Status: SHIPPED | OUTPATIENT
Start: 2023-11-17

## 2023-11-19 ENCOUNTER — PATIENT MESSAGE (OUTPATIENT)
Dept: FAMILY MEDICINE CLINIC | Facility: CLINIC | Age: 48
End: 2023-11-19

## 2023-11-20 ENCOUNTER — HOSPITAL ENCOUNTER (OUTPATIENT)
Dept: GENERAL RADIOLOGY | Age: 48
Discharge: HOME | End: 2023-11-20
Attending: NURSE PRACTITIONER
Payer: COMMERCIAL

## 2023-11-20 ENCOUNTER — VIRTUAL PHONE E/M (OUTPATIENT)
Dept: FAMILY MEDICINE CLINIC | Facility: CLINIC | Age: 48
End: 2023-11-20
Payer: COMMERCIAL

## 2023-11-20 DIAGNOSIS — J45.31 ACUTE EXACERBATION OF MILD PERSISTENT EXTRINSIC ASTHMA: Primary | ICD-10-CM

## 2023-11-20 DIAGNOSIS — J45.31 ACUTE EXACERBATION OF MILD PERSISTENT EXTRINSIC ASTHMA: ICD-10-CM

## 2023-11-20 PROCEDURE — 71046 X-RAY EXAM CHEST 2 VIEWS: CPT | Performed by: NURSE PRACTITIONER

## 2023-11-20 RX ORDER — AZITHROMYCIN 500 MG/1
500 TABLET, FILM COATED ORAL DAILY
Qty: 5 TABLET | Refills: 0 | Status: SHIPPED | OUTPATIENT
Start: 2023-11-20 | End: 2023-11-25

## 2023-11-20 RX ORDER — DEXTROMETHORPHAN HYDROBROMIDE AND PROMETHAZINE HYDROCHLORIDE 15; 6.25 MG/5ML; MG/5ML
5 SYRUP ORAL 4 TIMES DAILY PRN
Qty: 50 ML | Refills: 0 | Status: SHIPPED | OUTPATIENT
Start: 2023-11-20 | End: 2023-11-30

## 2023-11-20 NOTE — PROGRESS NOTES
Telehealth outside of [address] Verbal Consent   I conducted a telehealth visit with Barrie Leon today, 11/20/23, which was completed using two-way, real-time interactive audio  communication. This has been done in good jason to provide continuity of care in the best interest of the provider-patient relationship, due to the COVID -19 public health crisis/national emergency where restrictions of face-to-face office visits are ongoing. Every conscious effort was taken to allow for sufficient and adequate time to complete the visit. The patient was made aware of the limitations of the telehealth visit, including treatment limitations as no physical exam could be performed. The patient was advised to call 911 or to go to the ER in case there was an emergency. The patient was also advised of the potential privacy & security concerns related to the telehealth platform. The patient was made aware of where to find [facility] notice of privacy practices, telehealth consent form and other related consent forms and documents. which are located on the [facility] website. The patient verbally agreed to telehealth consent form, related consents and the risks discussed. Lastly, the patient confirmed that they were in [place]. Included in this visit, time may have been spent reviewing labs, medications, radiology tests and decision making. Appropriate medical decision-making and tests are ordered as detailed in the plan of care above. Coding/billing information is submitted for this visit based on complexity of care and/or time spent for the visit. Duration  11-20  minutes     HPI:   Barrie Leon is a 50year old female who presents for cough  for  6  days. Patient reports congestion, cough with yellow colored sputum, wheezing. Cough started gradually, tight, wheezy,deep, dry, worse at night, OTC cough syrups not helpful, getting worse.   Trigger for the cough:Sinus pressure, postnasal dripping, getting worse, yellow rhinorrhea. Current Outpatient Medications   Medication Sig Dispense Refill    topiramate 25 MG Oral Tab Take 3 tablets (75 mg total) by mouth 2 (two) times daily. 540 tablet 0    benzonatate 200 MG Oral Cap Take 1 capsule (200 mg total) by mouth 3 (three) times daily as needed for cough. 30 capsule 0    albuterol 108 (90 Base) MCG/ACT Inhalation Aero Soln Inhale 2 puffs into the lungs every 4 (four) hours as needed for Wheezing. 1 each 0    Rimegepant Sulfate (NURTEC) 75 MG Oral Tablet Dispersible Take 75 mg by mouth as needed. Take one tablet at onset of migraine. Maximum dose in 24 hours is 1 tablet (75mg). (Patient not taking: Reported on 11/15/2023) 8 tablet 3    Magnesium Oxide -Mg Supplement 400 (240 Mg) MG Oral Tab Take 1 tablet (400 mg total) by mouth 2 (two) times daily. (Patient not taking: Reported on 11/15/2023)      meclizine 25 MG Oral Tab Take 1 tablet (25 mg total) by mouth 3 (three) times daily as needed for Dizziness. (Patient not taking: Reported on 11/15/2023)      furosemide 20 MG Oral Tab Take 1 tablet (20 mg total) by mouth daily as needed. (Patient not taking: Reported on 11/15/2023) 30 tablet 0    colchicine 0.6 MG Oral Tab Take 1 tablet (0.6 mg total) by mouth 2 (two) times daily. (Patient not taking: Reported on 11/15/2023) 180 tablet 0    DULoxetine (CYMBALTA) 60 MG Oral Cap DR Particles Take 1 capsule (60 mg total) by mouth daily. 90 capsule 1    pantoprazole (PROTONIX) 40 MG Oral Tab EC Take 1 tablet (40 mg total) by mouth every morning before breakfast. 90 tablet 1    losartan 25 MG Oral Tab Take 1 tablet (25 mg total) by mouth daily. 90 tablet 1    allopurinol 300 MG Oral Tab Take 1 tablet (300 mg total) by mouth daily. 90 tablet 3    traMADol 50 MG Oral Tab Take 1 tablet (50 mg total) by mouth every 6 (six) hours as needed for Pain.  (Patient not taking: Reported on 11/15/2023) 10 tablet 0    ondansetron (ZOFRAN) 4 mg tablet Take 1 tablet (4 mg total) by mouth every 8 (eight) hours as needed for Nausea. (Patient not taking: Reported on 11/15/2023) 10 tablet 0    Meloxicam 15 MG Oral Tab Take 1 tablet (15 mg total) by mouth in the morning. 30 tablet 2    cyanocobalamin 1000 MCG Oral Tab Take 1 tablet (1,000 mcg total) by mouth daily. ACZONE 7.5 % External Gel daily      atorvastatin 20 MG Oral Tab Take 1 tablet (20 mg total) by mouth nightly. 90 tablet 2    Albuterol Sulfate HFA (PROAIR HFA) 108 (90 Base) MCG/ACT Inhalation Aero Soln Inhale 2 puffs into the lungs every 6 (six) hours as needed for Wheezing. 1 Inhaler 2    aspirin 81 MG Oral Tab EC Take 1 tablet (81 mg total) by mouth daily. Multiple Vitamin (MULTI-VITAMIN DAILY OR) Take 1 tablet by mouth daily. Past Medical History:   Diagnosis Date    Arrhythmia     a flutter after 4 th pregnancy and stabilized after medication. Recent dx w/ covid 9/2022    Asthma     Calculus of kidney     ? 2005    Diabetes ([facility] [address]     not on any medication    Extrinsic asthma, unspecified     Gout     High blood pressure     High cholesterol     History of COVID-19 11/2020    covid penumonia, tachycardia + hospitalized sx's x 2 weeks.     History of COVID-19 09/11/2022    fever x 3days , cough x 1 month  congestion x 1-2 weeks, covid pneumonia. + hospitalized    Migraine, unspecified, without mention of intractable migraine without mention of status migrainosus     Mitral valve disorders(424.0)     Morbid obesity with body mass index (BMI) of 50.0 to 59.9 in adult [facility])     Neuropathy     SELINA (obstructive sleep apnea) 12/26/18 PSG    AHI 18 REM AHI 45 Supine AHI 49 Sao2 Freddy 69%     Osteoarthritis     right hand    Pneumonia due to organism     covid pna 12/2020 and 09/2022    PONV (postoperative nausea and vomiting)     Unspecified gastritis and gastroduodenitis without mention of hemorrhage     Visual impairment     glasses    Vitamin D deficiency       Past Surgical History:   Procedure Laterality Date   199/, , ,     CHOLECYSTECTOMY      D & C      ENDOMETRIAL ABLATION      LEG/ANKLE SURGERY PROC UNLISTED  1998    ankle    OTHER SURGICAL HISTORY      rt wrist carpal tunnel 2023    TUBAL LIGATION            Family History   Problem Relation Age of Onset    Other (CVA) Paternal Grandmother     Other (CVA) Paternal Grandfather     Other (CVA) Maternal Grandmother     Other (leukemia) Father         also had HEP C    Hypertension Mother     Other (hyperlipidemia) Mother     Other (RA) Mother       Social History     Socioeconomic History    Marital status:    Tobacco Use    Smoking status: Never    Smokeless tobacco: Never   Vaping Use    Vaping Use: Never used   Substance and Sexual Activity    Alcohol use: Yes     Comment: 1 -2 a month    Drug use: No   Other Topics Concern    Caffeine Concern No     Comment: 2x per week    Exercise Yes     Comment: walks 1 mile 1 day a week    Seat Belt Yes         REVIEW OF SYSTEMS:   GENERAL: no fever, no chills. SKIN: no rashes, no hives. EYES:denies blurred vision or double vision,   HEENT: no earache, sore throat, mild sinus congestion. CHEST: no chest pains, no palpitations, no orthopnea. LUNGS: denies shortness of breath with exertion or rest. Wheezing, cough. CARDIOVASCULAR: denies chest pain on exertion  GI: no nausea or abdominal pain      EXAM:   There were no vitals taken for this visit. GENERAL: AAOx3, speech is clear , congested , in no apparent distress      ASSESSMENT AND PLAN:   Faisal Esquivel is a 50year old female who presents with:  Diagnoses and all orders for this visit:    Acute exacerbation of mild persistent extrinsic asthma  -     XR CHEST PA + LAT CHEST (CPT=71046); Future  -     azithromycin 500 MG Oral Tab; Take 1 tablet (500 mg total) by mouth daily for 5 days. -     promethazine-dextromethorphan 6.25-15 MG/5ML Oral Syrup; Take 5 mL by mouth 4 (four) times daily as needed for cough. Increase fluids, rest.Meds as below. The patient indicates understanding of these issues and agrees to the plan. The patient is asked to return if sx's persist or worsen.

## 2023-12-13 ENCOUNTER — PATIENT MESSAGE (OUTPATIENT)
Dept: FAMILY MEDICINE CLINIC | Facility: CLINIC | Age: 48
End: 2023-12-13

## 2023-12-13 NOTE — TELEPHONE ENCOUNTER
From: Britta Nicole  To: María Rendon  Sent: 12/13/2023 6:47 AM CST  Subject: Ongoing Cough    Good morning,  Dr. Sumaya Grimes. Since 11/7, I have had an ongoing cough and was diagnosed from a 4 in 1 swab positive for Flu A. Have taken the promethazine, cough syrup, other cough syrup, drops and I continue to cough. From time to time I do feel fatigue. Is there anything else that can be done or do I need to just wait for this cough to dissipate?     Thank you kindly,  Javier Durán

## 2023-12-13 NOTE — TELEPHONE ENCOUNTER
Pt sent message c/o cough since 11/07. Pt had recent cxr on 11/20 which was normal. Pt has taken promethazine, cough syrup/droops and at times has fatigue. Pt was positive for flu A on 11/15. Please advise. Thank you.    LOV: virtual phone-11/20/23

## 2023-12-14 RX ORDER — AZITHROMYCIN 250 MG/1
TABLET, FILM COATED ORAL
Qty: 6 TABLET | Refills: 0 | Status: SHIPPED | OUTPATIENT
Start: 2023-12-14 | End: 2023-12-18

## 2023-12-14 RX ORDER — METHYLPREDNISOLONE 4 MG/1
TABLET ORAL
Qty: 21 EACH | Refills: 0 | Status: SHIPPED | OUTPATIENT
Start: 2023-12-14

## 2023-12-27 ENCOUNTER — OFFICE VISIT (OUTPATIENT)
Dept: FAMILY MEDICINE CLINIC | Facility: CLINIC | Age: 48
End: 2023-12-27
Payer: COMMERCIAL

## 2023-12-27 ENCOUNTER — LAB ENCOUNTER (OUTPATIENT)
Dept: LAB | Age: 48
End: 2023-12-27
Attending: FAMILY MEDICINE
Payer: COMMERCIAL

## 2023-12-27 VITALS
WEIGHT: 293 LBS | RESPIRATION RATE: 18 BRPM | DIASTOLIC BLOOD PRESSURE: 80 MMHG | HEIGHT: 67 IN | OXYGEN SATURATION: 99 % | HEART RATE: 81 BPM | BODY MASS INDEX: 45.99 KG/M2 | SYSTOLIC BLOOD PRESSURE: 130 MMHG

## 2023-12-27 DIAGNOSIS — E83.42 HYPOMAGNESEMIA: ICD-10-CM

## 2023-12-27 DIAGNOSIS — E66.01 MORBID OBESITY (HCC): ICD-10-CM

## 2023-12-27 DIAGNOSIS — Z00.00 LABORATORY EXAMINATION ORDERED AS PART OF A ROUTINE GENERAL MEDICAL EXAMINATION: ICD-10-CM

## 2023-12-27 DIAGNOSIS — I87.2 VENOUS INSUFFICIENCY: ICD-10-CM

## 2023-12-27 DIAGNOSIS — R20.2 NUMBNESS AND TINGLING IN RIGHT HAND: ICD-10-CM

## 2023-12-27 DIAGNOSIS — M10.09 IDIOPATHIC GOUT OF MULTIPLE SITES, UNSPECIFIED CHRONICITY: ICD-10-CM

## 2023-12-27 DIAGNOSIS — E11.42 DIABETIC POLYNEUROPATHY ASSOCIATED WITH TYPE 2 DIABETES MELLITUS (HCC): ICD-10-CM

## 2023-12-27 DIAGNOSIS — R20.0 NUMBNESS AND TINGLING IN RIGHT HAND: ICD-10-CM

## 2023-12-27 DIAGNOSIS — R74.01 ELEVATED ALT MEASUREMENT: ICD-10-CM

## 2023-12-27 DIAGNOSIS — Z12.31 VISIT FOR SCREENING MAMMOGRAM: ICD-10-CM

## 2023-12-27 DIAGNOSIS — Z12.11 SCREENING FOR COLON CANCER: ICD-10-CM

## 2023-12-27 DIAGNOSIS — G89.29 CHRONIC MUSCULOSKELETAL PAIN: ICD-10-CM

## 2023-12-27 DIAGNOSIS — M79.18 CHRONIC MUSCULOSKELETAL PAIN: ICD-10-CM

## 2023-12-27 DIAGNOSIS — Z51.81 THERAPEUTIC DRUG MONITORING: ICD-10-CM

## 2023-12-27 DIAGNOSIS — R74.8 ELEVATED CK: ICD-10-CM

## 2023-12-27 DIAGNOSIS — Z00.00 ROUTINE GENERAL MEDICAL EXAMINATION AT A HEALTH CARE FACILITY: Primary | ICD-10-CM

## 2023-12-27 LAB
ALBUMIN SERPL-MCNC: 3.6 G/DL (ref 3.4–5)
ALBUMIN/GLOB SERPL: 1 {RATIO} (ref 1–2)
ALP LIVER SERPL-CCNC: 95 U/L
ALT SERPL-CCNC: 51 U/L
ANION GAP SERPL CALC-SCNC: 5 MMOL/L (ref 0–18)
AST SERPL-CCNC: 32 U/L (ref 15–37)
BASOPHILS # BLD AUTO: 0.06 X10(3) UL (ref 0–0.2)
BASOPHILS NFR BLD AUTO: 1.1 %
BILIRUB DIRECT SERPL-MCNC: 0.2 MG/DL (ref 0–0.2)
BILIRUB SERPL-MCNC: 1.1 MG/DL (ref 0.1–2)
BUN BLD-MCNC: 13 MG/DL (ref 9–23)
CALCIUM BLD-MCNC: 9.2 MG/DL (ref 8.5–10.1)
CHLORIDE SERPL-SCNC: 107 MMOL/L (ref 98–112)
CHOLEST SERPL-MCNC: 211 MG/DL (ref ?–200)
CK SERPL-CCNC: 108 U/L
CO2 SERPL-SCNC: 28 MMOL/L (ref 21–32)
CREAT BLD-MCNC: 1.08 MG/DL
CRP SERPL-MCNC: 0.34 MG/DL (ref ?–0.3)
EGFRCR SERPLBLD CKD-EPI 2021: 63 ML/MIN/1.73M2 (ref 60–?)
EOSINOPHIL # BLD AUTO: 0.12 X10(3) UL (ref 0–0.7)
EOSINOPHIL NFR BLD AUTO: 2.1 %
ERYTHROCYTE [DISTWIDTH] IN BLOOD BY AUTOMATED COUNT: 13.2 %
ERYTHROCYTE [SEDIMENTATION RATE] IN BLOOD: 34 MM/HR
EST. AVERAGE GLUCOSE BLD GHB EST-MCNC: 126 MG/DL (ref 68–126)
FASTING PATIENT LIPID ANSWER: YES
FASTING STATUS PATIENT QL REPORTED: YES
GLOBULIN PLAS-MCNC: 3.5 G/DL (ref 2.8–4.4)
GLUCOSE BLD-MCNC: 100 MG/DL (ref 70–99)
HBA1C MFR BLD: 6 % (ref ?–5.7)
HCT VFR BLD AUTO: 41.3 %
HDLC SERPL-MCNC: 41 MG/DL (ref 40–59)
HGB BLD-MCNC: 13.1 G/DL
IMM GRANULOCYTES # BLD AUTO: 0.03 X10(3) UL (ref 0–1)
IMM GRANULOCYTES NFR BLD: 0.5 %
LDLC SERPL CALC-MCNC: 100 MG/DL (ref ?–100)
LYMPHOCYTES # BLD AUTO: 2.25 X10(3) UL (ref 1–4)
LYMPHOCYTES NFR BLD AUTO: 39.5 %
MAGNESIUM SERPL-MCNC: 1.7 MG/DL (ref 1.6–2.6)
MCH RBC QN AUTO: 26.8 PG (ref 26–34)
MCHC RBC AUTO-ENTMCNC: 31.7 G/DL (ref 31–37)
MCV RBC AUTO: 84.6 FL
MONOCYTES # BLD AUTO: 0.38 X10(3) UL (ref 0.1–1)
MONOCYTES NFR BLD AUTO: 6.7 %
NEUTROPHILS # BLD AUTO: 2.86 X10 (3) UL (ref 1.5–7.7)
NEUTROPHILS # BLD AUTO: 2.86 X10(3) UL (ref 1.5–7.7)
NEUTROPHILS NFR BLD AUTO: 50.1 %
NONHDLC SERPL-MCNC: 170 MG/DL (ref ?–130)
OSMOLALITY SERPL CALC.SUM OF ELEC: 290 MOSM/KG (ref 275–295)
PLATELET # BLD AUTO: 182 10(3)UL (ref 150–450)
POTASSIUM SERPL-SCNC: 3.7 MMOL/L (ref 3.5–5.1)
PROT SERPL-MCNC: 7.1 G/DL (ref 6.4–8.2)
RBC # BLD AUTO: 4.88 X10(6)UL
SODIUM SERPL-SCNC: 140 MMOL/L (ref 136–145)
TRIGL SERPL-MCNC: 419 MG/DL (ref 30–149)
TSI SER-ACNC: 1.96 MIU/ML (ref 0.36–3.74)
URATE SERPL-MCNC: 6.7 MG/DL
VIT D+METAB SERPL-MCNC: 9.6 NG/ML (ref 30–100)
VLDLC SERPL CALC-MCNC: 71 MG/DL (ref 0–30)
WBC # BLD AUTO: 5.7 X10(3) UL (ref 4–11)

## 2023-12-27 PROCEDURE — 3075F SYST BP GE 130 - 139MM HG: CPT | Performed by: FAMILY MEDICINE

## 2023-12-27 PROCEDURE — 83036 HEMOGLOBIN GLYCOSYLATED A1C: CPT | Performed by: FAMILY MEDICINE

## 2023-12-27 PROCEDURE — 84443 ASSAY THYROID STIM HORMONE: CPT | Performed by: FAMILY MEDICINE

## 2023-12-27 PROCEDURE — 80061 LIPID PANEL: CPT | Performed by: FAMILY MEDICINE

## 2023-12-27 PROCEDURE — 82248 BILIRUBIN DIRECT: CPT | Performed by: INTERNAL MEDICINE

## 2023-12-27 PROCEDURE — 83735 ASSAY OF MAGNESIUM: CPT | Performed by: INTERNAL MEDICINE

## 2023-12-27 PROCEDURE — 82306 VITAMIN D 25 HYDROXY: CPT | Performed by: FAMILY MEDICINE

## 2023-12-27 PROCEDURE — 85652 RBC SED RATE AUTOMATED: CPT | Performed by: INTERNAL MEDICINE

## 2023-12-27 PROCEDURE — 80053 COMPREHEN METABOLIC PANEL: CPT | Performed by: INTERNAL MEDICINE

## 2023-12-27 PROCEDURE — 99396 PREV VISIT EST AGE 40-64: CPT | Performed by: FAMILY MEDICINE

## 2023-12-27 PROCEDURE — 3008F BODY MASS INDEX DOCD: CPT | Performed by: FAMILY MEDICINE

## 2023-12-27 PROCEDURE — 82550 ASSAY OF CK (CPK): CPT | Performed by: INTERNAL MEDICINE

## 2023-12-27 PROCEDURE — 84550 ASSAY OF BLOOD/URIC ACID: CPT | Performed by: INTERNAL MEDICINE

## 2023-12-27 PROCEDURE — 85025 COMPLETE CBC W/AUTO DIFF WBC: CPT | Performed by: INTERNAL MEDICINE

## 2023-12-27 PROCEDURE — 86140 C-REACTIVE PROTEIN: CPT | Performed by: INTERNAL MEDICINE

## 2023-12-27 PROCEDURE — 3079F DIAST BP 80-89 MM HG: CPT | Performed by: FAMILY MEDICINE

## 2023-12-27 RX ORDER — TIRZEPATIDE 2.5 MG/.5ML
0.5 INJECTION, SOLUTION SUBCUTANEOUS WEEKLY
Qty: 2 ML | Refills: 1 | Status: SHIPPED | OUTPATIENT
Start: 2023-12-27

## 2023-12-27 NOTE — PATIENT INSTRUCTIONS
[facility]    Dr. Jerome Mckinley or [address] [facility] [address], [address]  [facility]    [id number] [place] [address]

## 2023-12-28 ENCOUNTER — TELEPHONE (OUTPATIENT)
Dept: FAMILY MEDICINE CLINIC | Facility: CLINIC | Age: 48
End: 2023-12-28

## 2023-12-28 DIAGNOSIS — E55.9 VITAMIN D DEFICIENCY: Primary | ICD-10-CM

## 2023-12-28 DIAGNOSIS — E78.5 HYPERLIPIDEMIA, UNSPECIFIED HYPERLIPIDEMIA TYPE: ICD-10-CM

## 2023-12-28 RX ORDER — ERGOCALCIFEROL 1.25 MG/1
50000 CAPSULE ORAL WEEKLY
Qty: 4 CAPSULE | Refills: 3 | Status: SHIPPED | OUTPATIENT
Start: 2023-12-28

## 2023-12-28 RX ORDER — ICOSAPENT ETHYL 1000 MG/1
2 CAPSULE ORAL 2 TIMES DAILY
Qty: 360 CAPSULE | Refills: 0 | Status: SHIPPED | OUTPATIENT
Start: 2023-12-28

## 2023-12-28 NOTE — TELEPHONE ENCOUNTER
----- Message from Kimberly Montilla MD sent at 12/28/2023  9:04 AM CST -----  Prediabetes but its better. Low vit. D, Rx [id number] for 4 months, weekly please. I would suggest Vascepa very good fish oil for high TGL 2 capsules BID recheck vit. D and Lipids in 3 months.

## 2023-12-29 NOTE — TELEPHONE ENCOUNTER
Insurance will not cover Zepbound. She says they will cover Wegovy and Contrave. Please advise, thanks.

## 2023-12-29 NOTE — TELEPHONE ENCOUNTER
The patient's drug benefit plan provides coverage for other drugs which may be considered for treating your patient. Can your patient be treated with a formulary drug?  Available Formulary Alternatives: orlistat, QSYMIA, SAXENDA, WEGOVY [NOTE: If yes, provide your patient with a new prescription for the formulary product.]

## 2024-01-04 ENCOUNTER — OFFICE VISIT (OUTPATIENT)
Dept: RHEUMATOLOGY | Facility: CLINIC | Age: 49
End: 2024-01-04
Payer: COMMERCIAL

## 2024-01-04 VITALS
SYSTOLIC BLOOD PRESSURE: 140 MMHG | OXYGEN SATURATION: 97 % | RESPIRATION RATE: 16 BRPM | WEIGHT: 293 LBS | TEMPERATURE: 98 F | HEART RATE: 90 BPM | HEIGHT: 67 IN | BODY MASS INDEX: 45.99 KG/M2 | DIASTOLIC BLOOD PRESSURE: 68 MMHG

## 2024-01-04 DIAGNOSIS — R74.8 ELEVATED CK: ICD-10-CM

## 2024-01-04 DIAGNOSIS — G89.29 CHRONIC MUSCULOSKELETAL PAIN: ICD-10-CM

## 2024-01-04 DIAGNOSIS — I87.2 VENOUS INSUFFICIENCY: ICD-10-CM

## 2024-01-04 DIAGNOSIS — Z23 NEED FOR SHINGLES VACCINE: ICD-10-CM

## 2024-01-04 DIAGNOSIS — M1A.09X0 IDIOPATHIC CHRONIC GOUT OF MULTIPLE SITES WITHOUT TOPHUS: Primary | ICD-10-CM

## 2024-01-04 DIAGNOSIS — E11.42 DIABETIC POLYNEUROPATHY ASSOCIATED WITH TYPE 2 DIABETES MELLITUS (HCC): ICD-10-CM

## 2024-01-04 DIAGNOSIS — Z51.81 THERAPEUTIC DRUG MONITORING: ICD-10-CM

## 2024-01-04 DIAGNOSIS — R74.01 ELEVATED ALT MEASUREMENT: ICD-10-CM

## 2024-01-04 DIAGNOSIS — M79.18 CHRONIC MUSCULOSKELETAL PAIN: ICD-10-CM

## 2024-01-04 PROBLEM — M10.09 IDIOPATHIC GOUT OF MULTIPLE SITES: Status: ACTIVE | Noted: 2024-01-04

## 2024-01-04 PROCEDURE — 99214 OFFICE O/P EST MOD 30 MIN: CPT | Performed by: INTERNAL MEDICINE

## 2024-01-04 PROCEDURE — 3077F SYST BP >= 140 MM HG: CPT | Performed by: INTERNAL MEDICINE

## 2024-01-04 PROCEDURE — 3078F DIAST BP <80 MM HG: CPT | Performed by: INTERNAL MEDICINE

## 2024-01-04 PROCEDURE — 3008F BODY MASS INDEX DOCD: CPT | Performed by: INTERNAL MEDICINE

## 2024-01-04 RX ORDER — ALLOPURINOL 300 MG/1
450 TABLET ORAL DAILY
Qty: 135 TABLET | Refills: 2 | Status: SHIPPED | OUTPATIENT
Start: 2024-01-04 | End: 2024-09-30

## 2024-01-04 RX ORDER — COLCHICINE 0.6 MG/1
0.6 TABLET ORAL DAILY
Qty: 90 TABLET | Refills: 1 | Status: SHIPPED | OUTPATIENT
Start: 2024-01-04

## 2024-01-04 RX ORDER — DULOXETIN HYDROCHLORIDE 60 MG/1
60 CAPSULE, DELAYED RELEASE ORAL DAILY
Qty: 90 CAPSULE | Refills: 1 | Status: SHIPPED | OUTPATIENT
Start: 2024-01-04 | End: 2024-07-02

## 2024-01-04 RX ORDER — COLCHICINE 0.6 MG/1
0.6 TABLET ORAL DAILY
COMMUNITY
End: 2024-01-04

## 2024-01-04 NOTE — PROGRESS NOTES
Rheumatology Follow-up Patient Note  =====================================================================================================  ?  Chief Complaint:   Gout    Chief Complaint   Patient presents with    Follow - Up     Patient comes into the office today for a 6 month f/u apt with Dr for idiopathic gout of multiple sites. Patient states that she is feeling ok, she states that she did some blood work. She states that she still does get some symptoms of gout in her feet more so on the Rt than Left. She gets some numbness and pain. She states that when her feet are not numb they are painful.      PCP  Yamile Villeda MD  Fax: [phone]  Phone: [phone]  =====================================================================================================  HPI    Baylee Granados is a 48 year old female     ==============================================================================================================  Today's Visit: 01/04/24    Migraines: better, on nurtec prn and topamax. Still active today.  -took ibuprofen 800 mg which helped.   -doing prn nursing work now. Off work for 6 months.   -right foot swelling intermittent.  Gout episodes last for couple days before resolving    Continues on allopurinol 200 mg daily, colchicine 0.6 mg daily, Cymbalta 60 mg daily  -Still with leg swelling, worse at night.  Worse in the left leg  -Paresthesias in the bilateral feet persist better with Cymbalta      5 point ROS negative except noted above  I had reviewed past medical and family histories together with allergy and medication lists documented.    Medications:  Outpatient Medications Marked as Taking for the 1/4/24 encounter (Office Visit) with Rohit Her MD   Medication Sig Dispense Refill    allopurinol 300 MG Oral Tab Take 1.5 tablets (450 mg total) by mouth daily. 135 tablet 2    colchicine 0.6 MG Oral Tab Take 1 tablet (0.6 mg total) by mouth daily. 90 tablet 1    DULoxetine  (CYMBALTA) 60 MG Oral Cap DR Particles Take 1 capsule (60 mg total) by mouth daily. 90 capsule 1    ergocalciferol 1.25 MG (82252 UT) Oral Cap Take 1 capsule (50,000 Units total) by mouth once a week. 4 capsule 3    topiramate 25 MG Oral Tab Take 3 tablets (75 mg total) by mouth 2 (two) times daily. 540 tablet 0    albuterol 108 (90 Base) MCG/ACT Inhalation Aero Soln Inhale 2 puffs into the lungs every 4 (four) hours as needed for Wheezing. 1 each 0    pantoprazole (PROTONIX) 40 MG Oral Tab EC Take 1 tablet (40 mg total) by mouth every morning before breakfast. 90 tablet 1    losartan 25 MG Oral Tab Take 1 tablet (25 mg total) by mouth daily. 90 tablet 1    Meloxicam 15 MG Oral Tab Take 1 tablet (15 mg total) by mouth in the morning. 30 tablet 2    ACZONE 7.5 % External Gel daily      atorvastatin 20 MG Oral Tab Take 1 tablet (20 mg total) by mouth nightly. 90 tablet 2    Albuterol Sulfate HFA (PROAIR HFA) 108 (90 Base) MCG/ACT Inhalation Aero Soln Inhale 2 puffs into the lungs every 6 (six) hours as needed for Wheezing. 1 Inhaler 2    aspirin 81 MG Oral Tab EC Take 1 tablet (81 mg total) by mouth daily.      Multiple Vitamin (MULTI-VITAMIN DAILY OR) Take 1 tablet by mouth daily.         Modified Medications    Modified Medication Previous Medication    ALLOPURINOL 300 MG ORAL TAB allopurinol 300 MG Oral Tab       Take 1.5 tablets (450 mg total) by mouth daily.    Take 1 tablet (300 mg total) by mouth daily.    COLCHICINE 0.6 MG ORAL TAB colchicine 0.6 MG Oral Tab       Take 1 tablet (0.6 mg total) by mouth daily.    Take 1 tablet (0.6 mg total) by mouth daily.    DULOXETINE (CYMBALTA) 60 MG ORAL CAP DR PARTICLES DULoxetine (CYMBALTA) 60 MG Oral Cap DR Particles       Take 1 capsule (60 mg total) by mouth daily.    Take 1 capsule (60 mg total) by mouth daily.     Medications Discontinued During This Encounter   Medication Reason    ZEPBOUND 2.5 MG/0.5ML Subcutaneous Solution Auto-injector     cyanocobalamin 1000 MCG  Oral Tab     allopurinol 300 MG Oral Tab     DULoxetine (CYMBALTA) 60 MG Oral Cap DR Particles     colchicine 0.6 MG Oral Tab      ?  Allergies:  Allergies   Allergen Reactions    Darvocet [Propoxyphene N-Apap] HIVES     N 50 TABS    Demerol HIVES     TABS    Grape ANAPHYLAXIS    Hydrocodone-Acetaminophen OTHER (SEE COMMENTS)     ASA TABS  ASA TABS    Lortab HIVES     ASA TABS    Morphine HIVES     Derivatives      Propoxyphene OTHER (SEE COMMENTS)     N 50 TABS  N 50 TABS    Metformin DIARRHEA         Objective    Vitals:    01/04/24 1323   BP: 140/68   Pulse: 90   Resp: 16   Temp: 98 °F (36.7 °C)   SpO2: 97%   Weight: (!) 332 lb (150.6 kg)   Height: 5' 7\" (1.702 m)     GEN: NAD, well-nourished.   HEENT: Head: NCAT. Face: No lesions. Eyes: Conjunctiva clear.   PULM:  easy effort  Extremities: No cyanosis, edema or deformities.   Neurologic: Strength, CN2-12 grossly intact   Skin: No lesions or rashes.  MSK: 28 joint count performed. No evidence of synovitis in mcp, pip, dip, wrist, elbows, shoulders, hips, knees, ankles, mtp unless otherwise noted. Full ROM of elbows, wrists, knees.     RMCP2 TTP and volar nodule at palmar RMCP2 noted  -R>L pitting edema and TTP  -decreased sensation in the lower extremities, particularly of the feet.    Labs:    Additional Labs:  12/2023  Vitamin D 9.6  TSH WNL    Lab Results   Component Value Date     12/27/2023     12/30/2022     (H) 12/16/2022    CK 2,132 (H) 12/13/2022     07/05/2022     07/29/2021    CK 97 11/09/2020         Lab Results   Component Value Date    URIC 6.7 (H) 12/27/2023    URIC 6.1 (H) 12/30/2022    URIC 6.8 (H) 12/13/2022    URIC 4.5 07/05/2022    URIC 7.3 (H) 07/29/2021    URIC 6.9 (H) 07/07/2021    URIC 9.1 (H) 01/27/2021    URIC 8.3 (H) 05/28/2020     Lab Results   Component Value Date    WBC 5.7 12/27/2023    RBC 4.88 12/27/2023    HGB 13.1 12/27/2023    HCT 41.3 12/27/2023    .0 12/27/2023    MCV 84.6 12/27/2023     MCH 26.8 12/27/2023    MCHC 31.7 12/27/2023    RDW 13.2 12/27/2023    NEPRELIM 2.86 12/27/2023    NEPERCENT 50.1 12/27/2023    LYPERCENT 39.5 12/27/2023    MOPERCENT 6.7 12/27/2023    EOPERCENT 2.1 12/27/2023    BAPERCENT 1.1 12/27/2023    NE 2.86 12/27/2023    LYMABS 2.25 12/27/2023    MOABSO 0.38 12/27/2023    EOABSO 0.12 12/27/2023    BAABSO 0.06 12/27/2023     Lab Results   Component Value Date     (H) 12/27/2023    BUN 13 12/27/2023    BUNCREA 16.7 07/29/2021    CREATSERUM 1.08 (H) 12/27/2023    ANIONGAP 5 12/27/2023    GFR 78 01/03/2018    GFRNAA 56 (L) 07/05/2022    GFRAA 65 07/05/2022    CA 9.2 12/27/2023    OSMOCALC 290 12/27/2023    ALKPHO 95 12/27/2023    AST 32 12/27/2023    ALT 51 12/27/2023    BILT 1.1 12/27/2023    TP 7.1 12/27/2023    ALB 3.6 12/27/2023    GLOBULIN 3.5 12/27/2023    AGRATIO 1.4 04/27/2013     12/27/2023    K 3.7 12/27/2023     12/27/2023    CO2 28.0 12/27/2023 7/2022:  RF/CCP neg    7/2021  [id number] neg    Radiology:    Radiology review:      =====================================================================================================  Assessment and Plan    Assessment:  1. Idiopathic chronic gout of multiple sites without tophus    2. Diabetic polyneuropathy associated with type 2 diabetes mellitus (HCC)    3. Elevated CK    4. Elevated ALT measurement    5. Therapeutic drug monitoring    6. Venous insufficiency    7. Chronic musculoskeletal pain    8. Need for shingles vaccine        #Gout: Hx of left podagra, left ankle swelling, diffuse left foot swelling, left Achilles tendinitis with probable involvement of the right MTPs as well, ?right MCP2 involvement  -Patient still with intermittent right foot swelling concerning for gout flares  -Last serum urate not at goal    # Bilateral lower extremity edema: d/t venous insufficiency.  Negative duplex Dopplers in the past.       #Diabetic neuropathy, chronic musculoskeletal pain: Improved with  Cymbalta    High risk medication labs including CMP reviewed from 12/2023. Results are stable. [id number] neg    Plan:  Gout:  -repeat blood work in 6 months   -Allopurinol 300 mg: increase to 1.5 pills daily to equal 450 mg allopurinol.  This should get serum urate to target.  -continue colchicine 0.6 mg daily; consider stopping at next appointment  -Continue meloxicam daily as needed    For leg swelling:  -Prop legs on two pillows at night.  -wear compression stockings  -ask Dr. AGUILAR about PRN order for lasix, this has helped venous insufficiency in the past.  Watch out for hypomagnesemia which has been an issue in the past    Get Shingrix vaccine:       Diagnoses and all orders for this visit:    Idiopathic chronic gout of multiple sites without tophus  -     Comp Metabolic Panel (14); Future  -     Uric Acid; Future  -     CK (Creatine Kinase) (Not Creatinine); Future  -     allopurinol 300 MG Oral Tab; Take 1.5 tablets (450 mg total) by mouth daily.  -     DULoxetine (CYMBALTA) 60 MG Oral Cap DR Particles; Take 1 capsule (60 mg total) by mouth daily.    Diabetic polyneuropathy associated with type 2 diabetes mellitus (HCC)  -     Comp Metabolic Panel (14); Future  -     Uric Acid; Future  -     CK (Creatine Kinase) (Not Creatinine); Future  -     DULoxetine (CYMBALTA) 60 MG Oral Cap DR Particles; Take 1 capsule (60 mg total) by mouth daily.    Elevated CK  -     allopurinol 300 MG Oral Tab; Take 1.5 tablets (450 mg total) by mouth daily.    Elevated ALT measurement  -     allopurinol 300 MG Oral Tab; Take 1.5 tablets (450 mg total) by mouth daily.    Therapeutic drug monitoring  -     Comp Metabolic Panel (14); Future  -     Uric Acid; Future  -     CK (Creatine Kinase) (Not Creatinine); Future  -     allopurinol 300 MG Oral Tab; Take 1.5 tablets (450 mg total) by mouth daily.  -     colchicine 0.6 MG Oral Tab; Take 1 tablet (0.6 mg total) by mouth daily.  -     DULoxetine (CYMBALTA) 60 MG Oral Cap DR Particles;  Take 1 capsule (60 mg total) by mouth daily.    Venous insufficiency  -     DULoxetine (CYMBALTA) 60 MG Oral Cap DR Particles; Take 1 capsule (60 mg total) by mouth daily.    Chronic musculoskeletal pain  -     Comp Metabolic Panel (14); Future  -     Uric Acid; Future  -     CK (Creatine Kinase) (Not Creatinine); Future  -     allopurinol 300 MG Oral Tab; Take 1.5 tablets (450 mg total) by mouth daily.  -     colchicine 0.6 MG Oral Tab; Take 1 tablet (0.6 mg total) by mouth daily.  -     DULoxetine (CYMBALTA) 60 MG Oral Cap DR Particles; Take 1 capsule (60 mg total) by mouth daily.    Need for shingles vaccine            Return in about 6 months (around 7/4/2024).      The above plan of care, diagnosis, orders, and follow-up were discussed with the patient. Questions related to this recommended plan of care were answered.    Thank you for referring this delightful patient to me. Please feel free to contact me with any questions.     This report was performed utilizing speech recognition software technology. Despite proofreading, speech recognition errors could escape detection. If a word or phrase is confusing or out of context, please do not hesitate to call for   clarification.       Kind regards      Rohit Her MD  [facility]

## 2024-01-04 NOTE — PATIENT INSTRUCTIONS
-Allopurinol 300 mg: increase to 1.5 pills daily  -continue colchicine 0.6 mg daily  -continue cymbalta.   -Continue meloxicam daily as needed    For leg swelling:  -Prop legs on two pillows at night.  -wear compression stockings  -ask Dr. AGUILAR about PRN order for lasix    Shingrix vaccine: get this at pharmacy

## 2024-01-05 DIAGNOSIS — G43.711 INTRACTABLE CHRONIC MIGRAINE WITHOUT AURA AND WITH STATUS MIGRAINOSUS: Primary | ICD-10-CM

## 2024-01-05 NOTE — TELEPHONE ENCOUNTER
Medication: NURTEC 75 MG Oral Tablet Dispersible      Date of last refill: 08/10/2023 (#8/3)-discon  [place]     Last office visit: 08/10/2023  Due back to clinic per last office note:  6-9 months  Date next office visit scheduled:    Future Appointments   Date Time Provider Department Center   1/15/2024  3:00 PM [facility] [address]   2/15/2024  1:20 PM Shayne Her MD [facility] [address]   7/11/2024 10:30 AM Rohit Her MD [id number] [address]           Last OV note recommendation:    Plan:  MRI brain reviewed, normal  Cont current meds   Nurtec prn  Headache diary  Migraine headache education given  See orders and medications filed with this encounter. The patient indicates understanding of these issues and agrees with the plan.  Discussed with patient regarding assessment, work up, care plan and possible adverse and side effects of the medications.  RTC 6-9 months  Pt should go ER for any new or worsening symptoms and contact office

## 2024-01-08 RX ORDER — RIMEGEPANT SULFATE 75 MG/75MG
TABLET, ORALLY DISINTEGRATING ORAL
Qty: 8 TABLET | Refills: 2 | Status: SHIPPED | OUTPATIENT
Start: 2024-01-08

## 2024-02-09 ENCOUNTER — HOSPITAL ENCOUNTER (OUTPATIENT)
Dept: MAMMOGRAPHY | Age: 49
Discharge: HOME | End: 2024-02-09
Attending: FAMILY MEDICINE
Payer: COMMERCIAL

## 2024-02-09 DIAGNOSIS — Z12.31 VISIT FOR SCREENING MAMMOGRAM: ICD-10-CM

## 2024-02-09 PROCEDURE — 77067 SCR MAMMO BI INCL CAD: CPT | Performed by: FAMILY MEDICINE

## 2024-02-09 PROCEDURE — 77063 BREAST TOMOSYNTHESIS BI: CPT | Performed by: FAMILY MEDICINE

## 2024-02-15 ENCOUNTER — OFFICE VISIT (OUTPATIENT)
Dept: NEUROLOGY | Facility: CLINIC | Age: 49
End: 2024-02-15
Payer: COMMERCIAL

## 2024-02-15 VITALS
RESPIRATION RATE: 16 BRPM | SYSTOLIC BLOOD PRESSURE: 134 MMHG | BODY MASS INDEX: 52 KG/M2 | HEART RATE: 86 BPM | WEIGHT: 293 LBS | DIASTOLIC BLOOD PRESSURE: 78 MMHG

## 2024-02-15 DIAGNOSIS — G43.711 INTRACTABLE CHRONIC MIGRAINE WITHOUT AURA AND WITH STATUS MIGRAINOSUS: Primary | ICD-10-CM

## 2024-02-15 PROCEDURE — 99215 OFFICE O/P EST HI 40 MIN: CPT | Performed by: OTHER

## 2024-02-15 PROCEDURE — 3078F DIAST BP <80 MM HG: CPT | Performed by: OTHER

## 2024-02-15 PROCEDURE — 3075F SYST BP GE 130 - 139MM HG: CPT | Performed by: OTHER

## 2024-02-15 NOTE — PROGRESS NOTES
[facility] Neurology Outpatient Progress Note  Date of service: 2/15/2024    Assessment:     ICD-10-CM    1. Intractable chronic migraine without aura and with status migrainosus  G43.711         Chronic migraine; worse due to increased stress     Plan:  Monitor side effect from Nurtec  Other option is qulipta, or aimovig or botox  Headache diary  Migraine headache education given  See orders and medications filed with this encounter. The patient indicates understanding of these issues and agrees with the plan.  Discussed with patient regarding assessment, work up, care plan and possible adverse and side effects of the medications.  RTC 6-9 months  Pt should go ER for any new or worsening symptoms and contact office     Subjective:   History:  Pt states here for worsening migraine. Pt states the last month has had a lot of break through migraines. Pt states has been under more stress during the last month. Pt states nausea with Nurtec but willing to try again.    MRI brain was normal.  Per initial visit note:  Baylee Granados is a 47 year old female with past medical history as listed below presents here for initial evaluation of migraines. Will also have dizziness and ringing in ears. Migraines have been worse over the last few months, nausea/vomiting, has migraine since age 20s, in the past 5-6 months, more migraine were noted, states she has 5 headache days out of 7 days /week,  Has sleep apnea but also felt allergy related mucous buildup in sinus might have played a role. No brain MRI done. States she tried ubrelvy sample and it was helping headache but caused nausea. imitrex in the past caused side effect. fioricet provides relief for only about one hour.      History/Other:   REVIEW OF SYSTEMS:  A 10-point system was reviewed. Pertinent positives and negatives are noted as above       Current Outpatient Medications:     Rimegepant Sulfate (NURTEC) 75 MG Oral Tablet Dispersible, DISSOLVE ONE TABLET BY MOUTH AS  NEEDED AT THE ONSET OF MIGRAINE.  MAXIMUM DOSE IN 24 HOURS IS 1 TABLET (75MG)., Disp: 8 tablet, Rfl: 2    allopurinol 300 MG Oral Tab, Take 1.5 tablets (450 mg total) by mouth daily., Disp: 135 tablet, Rfl: 2    colchicine 0.6 MG Oral Tab, Take 1 tablet (0.6 mg total) by mouth daily., Disp: 90 tablet, Rfl: 1    DULoxetine (CYMBALTA) 60 MG Oral Cap DR Particles, Take 1 capsule (60 mg total) by mouth daily., Disp: 90 capsule, Rfl: 1    ergocalciferol 1.25 MG (62823 UT) Oral Cap, Take 1 capsule (50,000 Units total) by mouth once a week., Disp: 4 capsule, Rfl: 3    topiramate 25 MG Oral Tab, Take 3 tablets (75 mg total) by mouth 2 (two) times daily., Disp: 540 tablet, Rfl: 0    albuterol 108 (90 Base) MCG/ACT Inhalation Aero Soln, Inhale 2 puffs into the lungs every 4 (four) hours as needed for Wheezing., Disp: 1 each, Rfl: 0    pantoprazole (PROTONIX) 40 MG Oral Tab EC, Take 1 tablet (40 mg total) by mouth every morning before breakfast., Disp: 90 tablet, Rfl: 1    losartan 25 MG Oral Tab, Take 1 tablet (25 mg total) by mouth daily., Disp: 90 tablet, Rfl: 1    Meloxicam 15 MG Oral Tab, Take 1 tablet (15 mg total) by mouth in the morning., Disp: 30 tablet, Rfl: 2    ACZONE 7.5 % External Gel, daily, Disp: , Rfl:     atorvastatin 20 MG Oral Tab, Take 1 tablet (20 mg total) by mouth nightly., Disp: 90 tablet, Rfl: 2    Albuterol Sulfate HFA (PROAIR HFA) 108 (90 Base) MCG/ACT Inhalation Aero Soln, Inhale 2 puffs into the lungs every 6 (six) hours as needed for Wheezing., Disp: 1 Inhaler, Rfl: 2    aspirin 81 MG Oral Tab EC, Take 1 tablet (81 mg total) by mouth daily., Disp: , Rfl:     Multiple Vitamin (MULTI-VITAMIN DAILY OR), Take 1 tablet by mouth daily.  , Disp: , Rfl:     Icosapent Ethyl (VASCEPA) 1 g Oral Cap, Take 2 g by mouth in the morning and 2 g before bedtime. (Patient not taking: Reported on 1/4/2024), Disp: 360 capsule, Rfl: 0  Allergies:  Allergies   Allergen Reactions    Darvocet [Propoxyphene N-Apap] HIVES      N 50 TABS    Demerol HIVES     TABS    Grape ANAPHYLAXIS    Hydrocodone-Acetaminophen OTHER (SEE COMMENTS)     ASA TABS  ASA TABS    Lortab HIVES     ASA TABS    Morphine HIVES     Derivatives      Propoxyphene OTHER (SEE COMMENTS)     N 50 TABS  N 50 TABS    Metformin DIARRHEA     Past Medical History:   Diagnosis Date    Arrhythmia     a flutter after 4 th pregnancy and stabilized after medication. Recent dx w/ covid 2022    Asthma     Calculus of kidney     ?     Diabetes (HCC)     not on any medication    Extrinsic asthma, unspecified     Gout     High blood pressure     High cholesterol     History of COVID-19 2020    covid penumonia, tachycardia + hospitalized sx's x 2 weeks.    History of COVID-19 2022    fever x 3days , cough x 1 month  congestion x 1-2 weeks, covid pneumonia. + hospitalized    Migraine, unspecified, without mention of intractable migraine without mention of status migrainosus     Mitral valve disorders(424.0)     Morbid obesity with body mass index (BMI) of 50.0 to 59.9 in adult ([facility])     Neuropathy     SELINA (obstructive sleep apnea) 18 PSG    AHI 18 REM AHI 45 Supine AHI 49 Sao2 Freddy 69%     Osteoarthritis     right hand    Pneumonia due to organism     covid pna 2020 and 2022    PONV (postoperative nausea and vomiting)     Unspecified gastritis and gastroduodenitis without mention of hemorrhage     Visual impairment     glasses    Vitamin D deficiency      Past Surgical History:   Procedure Laterality Date      , , ,     CHOLECYSTECTOMY      D & C      ENDOMETRIAL ABLATION      LEG/ANKLE SURGERY PROC UNLISTED  1998    ankle    OTHER SURGICAL HISTORY      rt wrist carpal tunnel 2023    TUBAL LIGATION           Social History:  Social History     Tobacco Use    Smoking status: Never    Smokeless tobacco: Never   Substance Use Topics    Alcohol use: Yes     Comment: 1 -2 a month     Family History   Problem Relation Age  of Onset    Other (CVA) Paternal Grandmother     Other (CVA) Paternal Grandfather     Other (CVA) Maternal Grandmother     Other (leukemia) Father         also had HEP C    Hypertension Mother     Other (hyperlipidemia) Mother     Other (RA) Mother      Objective:   Neurological Examination:  /78 (BP Location: Left arm, Patient Position: Sitting, Cuff Size: adult)   Pulse 86   Resp 16   Wt (!) 329 lb (149.2 kg)   BMI 51.53 kg/m²   Mental status: A & O X 3  Language: no aphasia  Speech: no dysarthria  CN II-XII: intact   Motor strength: 5/5 all extremities  Tone: normal  Coordination: normal  Sensory: symmetric  Gait: normal    Test reviewed on 2/15/2024      Shayne Her MD (Michael)  Neurology  [facility]  2/15/2024, 1:54 PM  CC: Yamile Villeda MD

## 2024-02-15 NOTE — PATIENT INSTRUCTIONS
Refill policies:    Allow 2-3 business days for refills; controlled substances may take longer.  Contact your pharmacy at least 5 days prior to running out of medication and have them send an electronic request or submit request through the “request refill” option in your [employer] account.  Refills are not addressed on weekends; covering physicians do not authorize routine medications on weekends.  No narcotics or controlled substances are refilled after noon on Fridays or by on call physicians.  By law, narcotics must be electronically prescribed.  A 30 day supply with no refills is the maximum allowed.  If your prescription is due for a refill, you may be due for a follow up appointment.  To best provide you care, patients receiving routine medications need to be seen at least once a year.  Patients receiving narcotic/controlled substance medications need to be seen at least once every 3 months.  In the event that your preferred pharmacy does not have the requested medication in stock (e.g. Backordered), it is your responsibility to find another pharmacy that has the requested medication available.  We will gladly send a new prescription to that pharmacy at your request.    Scheduling Tests:    If your physician has ordered radiology tests such as MRI or CT scans, please contact Central Scheduling at [phone] right away to schedule the test.  Once scheduled, the [facility] Centralized Referral Team will work with your insurance carrier to obtain pre-certification or prior authorization.  Depending on your insurance carrier, approval may take 3-10 days.  It is highly recommended patients assure they have received an authorization before having a test performed.  If test is done without insurance authorization, patient may be responsible for the entire amount billed.      Precertification and Prior Authorizations:  If your physician has recommended that you have a procedure or additional testing performed the [facility]  Centralized Referral Team will contact your insurance carrier to obtain pre-certification or prior authorization.    You are strongly encouraged to contact your insurance carrier to verify that your procedure/test has been approved and is a COVERED benefit.  Although the [facility] Centralized Referral Team does its due diligence, the insurance carrier gives the disclaimer that \"Although the procedure is authorized, this does not guarantee payment.\"    Ultimately the patient is responsible for payment.   Thank you for your understanding in this matter.  Paperwork Completion:  If you require FMLA or disability paperwork for your recovery, please make sure to either drop it off or have it faxed to our office at [phone]. Be sure the form has your name and date of birth on it.  The form will be faxed to our Forms Department and they will complete it for you.  There is a 25$ fee for all forms that need to be filled out.  Please be aware there is a 10-14 day turnaround time.  You will need to sign a release of information (NINI) form if your paperwork does not come with one.  You may call the Forms Department with any questions at [phone].  Their fax number is [phone].

## 2024-02-15 NOTE — PROGRESS NOTES
Pt states here for migraines. Pt states the last month has had a lot of break through migraines. Pt states has been under more stress during the last month. Pt states nausea with Nurtec has increased

## 2024-02-16 DIAGNOSIS — G43.711 INTRACTABLE CHRONIC MIGRAINE WITHOUT AURA AND WITH STATUS MIGRAINOSUS: ICD-10-CM

## 2024-02-16 RX ORDER — TOPIRAMATE 25 MG/1
75 TABLET ORAL 2 TIMES DAILY
Qty: 540 TABLET | Refills: 1 | Status: SHIPPED | OUTPATIENT
Start: 2024-02-16

## 2024-02-16 NOTE — TELEPHONE ENCOUNTER
Medication: Topiramate 25 mg     Date of last refill:11/17/2023  [place]      Last office visit: 02/15/2024  Due back to clinic per last office note:    Date next office visit scheduled:    Future Appointments   Date Time Provider Department Center   7/11/2024 10:30 AM Rohit Her MD [facility] [address]           Last OV note recommendation:    1. Intractable chronic migraine without aura and with status migrainosus  G43.711            Chronic migraine; worse due to increased stress     Plan:  Monitor side effect from Nurtec  Other option is qulipta, or aimovig or botox  Headache diary  Migraine headache education given  See orders and medications filed with this encounter. The patient indicates understanding of these issues and agrees with the plan.  Discussed with patient regarding assessment, work up, care plan and possible adverse and side effects of the medications.  RTC 6-9 months  Pt should go ER for any new or worsening symptoms and contact office

## 2024-02-22 ENCOUNTER — PATIENT MESSAGE (OUTPATIENT)
Dept: FAMILY MEDICINE CLINIC | Facility: CLINIC | Age: 49
End: 2024-02-22

## 2024-02-22 NOTE — TELEPHONE ENCOUNTER
From: Baylee Granados  To: Yamile Villeda  Sent: 2/22/2024 9:48 AM CST  Subject: patient Medical consultation report for dental hygiene program    Good morning Dr. Villeda,  My daughter is currently in the dental hygiene program in [facility]. Today I am scheduled to be a patient of hers and when they took my blood pressure this morning it was 160/90 to our [place] I now have to have a patient medical consultation report completed. I did have it faxed over to your office this morning.     The morning appointment for me has been canceled. I now have the opportunity to be seen this afternoon by Jayme. Can this form be completed and sent back to the [facility], dental hygiene program, today please?    Thank you,  Baylee Granados

## 2024-02-22 NOTE — TELEPHONE ENCOUNTER
Patient is asking if form is completed if it can be faxed to the dental program office. The patient provided the fax number which is [phone] Attention: Mrs. Kayser.

## 2024-02-22 NOTE — TELEPHONE ENCOUNTER
Please see pt message. LOV 12/27. Will you clear pt for dental procedure or does pt need OV?  Pt's BP for Dr. Her on 2/15 134/78

## 2024-03-04 ENCOUNTER — MED REC SCAN ONLY (OUTPATIENT)
Dept: FAMILY MEDICINE CLINIC | Facility: CLINIC | Age: 49
End: 2024-03-04

## 2024-03-24 NOTE — PROGRESS NOTES
[facility]  Progress Note     Lelo Granados Patient Status:  Inpatient    1975 MRN [id number]   [facility] 3NW-A Attending Miguelito Romero MD   Hosp Day # 4 PCP Delfino Haskins MD     Chief Complaint: SOB    S: Patie 0.83 mg/dL). No results for input(s): PTP, INR in the last 168 hours.          COVID-19 Lab Results    COVID-19  Lab Results   Component Value Date    COVID19 Detected (A) 11/03/2020       Pro-Calcitonin  Recent Labs   Lab 11/09/20  1440   PCT 0.05 Imaging Studies/Medications

## 2024-04-01 ENCOUNTER — PATIENT MESSAGE (OUTPATIENT)
Dept: FAMILY MEDICINE CLINIC | Facility: CLINIC | Age: 49
End: 2024-04-01

## 2024-04-01 NOTE — TELEPHONE ENCOUNTER
Message received from Dr. Villeda:  Yamile Villeda MD  [facility] Clinical Staff23 minutes ago (1:06 PM)     LP  Please provide the pt with emergency services in [facility] and also list of specialists. Thanks.

## 2024-04-01 NOTE — TELEPHONE ENCOUNTER
From: Baylee Granados  To: Yamile Villeda  Sent: 4/1/2024 9:43 AM CDT  Subject: Mental health assistance     Good morning,  I have not been feeling well mentally or emotionally as of late. Though these feelings are not new.   I have been coping as best as I could.   I am now brave enough to say out loud; I would like to see a mental health professional. As I am typing this, I am in tears, my chest feels heavy,  and I feel overwhelmed.     I need help please.     Thank you,  Baylee Granados

## 2024-04-03 ENCOUNTER — TELEPHONE (OUTPATIENT)
Dept: FAMILY MEDICINE CLINIC | Facility: CLINIC | Age: 49
End: 2024-04-03

## 2024-04-03 NOTE — TELEPHONE ENCOUNTER
Received a call from gia rai, talked with Ana who was asking why referral was placed. Ana informed that pt sent [employer] message requesting for mental health. Ana verbalized understanding.

## 2024-05-28 DIAGNOSIS — G43.711 INTRACTABLE CHRONIC MIGRAINE WITHOUT AURA AND WITH STATUS MIGRAINOSUS: ICD-10-CM

## 2024-05-29 RX ORDER — RIMEGEPANT SULFATE 75 MG/75MG
TABLET, ORALLY DISINTEGRATING ORAL
Qty: 8 TABLET | Refills: 2 | Status: SHIPPED | OUTPATIENT
Start: 2024-05-29

## 2024-05-29 NOTE — TELEPHONE ENCOUNTER
Medication: NURTEC 75 MG Oral Tablet Dispersible      Date of last refill: 01/08/2024 (#8/2)  [place]     Last office visit: 02/15/2024  Due back to clinic per last office note:  Around 10/15/2024  Date next office visit scheduled:    Future Appointments   Date Time Provider Department Center   8/1/2024 10:00 AM Rohit Her MD [facility] [address]           Last OV note recommendation:    Assessment:       ICD-10-CM     1. Intractable chronic migraine without aura and with status migrainosus  G43.711            Chronic migraine; worse due to increased stress     Plan:  Monitor side effect from Nurtec  Other option is qulipta, or aimovig or botox  Headache diary  Migraine headache education given  See orders and medications filed with this encounter. The patient indicates understanding of these issues and agrees with the plan.  Discussed with patient regarding assessment, work up, care plan and possible adverse and side effects of the medications.  RTC 6-9 months  Pt should go ER for any new or worsening symptoms and contact office

## 2024-06-03 ENCOUNTER — TELEMEDICINE (OUTPATIENT)
Dept: FAMILY MEDICINE CLINIC | Facility: CLINIC | Age: 49
End: 2024-06-03
Payer: COMMERCIAL

## 2024-06-03 DIAGNOSIS — I10 PRIMARY HYPERTENSION: Primary | ICD-10-CM

## 2024-06-03 DIAGNOSIS — F32.1 CURRENT MODERATE EPISODE OF MAJOR DEPRESSIVE DISORDER WITHOUT PRIOR EPISODE (HCC): ICD-10-CM

## 2024-06-03 DIAGNOSIS — E66.01 MORBID OBESITY (HCC): ICD-10-CM

## 2024-06-03 DIAGNOSIS — F43.10 PTSD (POST-TRAUMATIC STRESS DISORDER): ICD-10-CM

## 2024-06-03 PROBLEM — F32.2 CURRENT SEVERE EPISODE OF MAJOR DEPRESSIVE DISORDER WITHOUT PSYCHOTIC FEATURES WITHOUT PRIOR EPISODE (HCC): Status: ACTIVE | Noted: 2024-06-03

## 2024-06-03 PROCEDURE — 99443 PHONE E/M BY PHYS 21-30 MIN: CPT | Performed by: FAMILY MEDICINE

## 2024-06-03 RX ORDER — TIRZEPATIDE 2.5 MG/.5ML
0.5 INJECTION, SOLUTION SUBCUTANEOUS WEEKLY
Qty: 2 ML | Refills: 1 | Status: SHIPPED | OUTPATIENT
Start: 2024-06-03

## 2024-06-04 ENCOUNTER — PATIENT MESSAGE (OUTPATIENT)
Dept: FAMILY MEDICINE CLINIC | Facility: CLINIC | Age: 49
End: 2024-06-04

## 2024-06-04 NOTE — TELEPHONE ENCOUNTER
From: Baylee Granados  To: Yamile Villeda  Sent: 6/4/2024 8:42 AM CDT  Subject: Zepbound Prescription    Good morning Dr. Villeda,  It was very nice speaking with you yesterday during our video visit.   I just wanted to let you know that I called my insurance company and they stated that they will cover the Zepbound prescription.     A prior authorization is needed in order for it to be covered and the Prescription to be processed appropriately.    I called my pharmacy this morning, and they stated they did not see a prescription sent over as of yet. So I was just Sending a message this morning to let you know the updates that I have come across.    I look forward to this new journey, taking this new medication, and finding my new normal.    Thank you,  Baylee Granados

## 2024-06-04 NOTE — PROGRESS NOTES
Baylee Granados verbally consents to a Virtual/Video Check-In service on 06/04/24.    Time spent: 30 min                CC: Chronic conditions f/u     HPI:  Pt presents for obesity problem.  Pt has it for years.  Risk factors: sedentary life style.  Makes better: low calories diet and physical activities.  Makes worse: sugar craving and no exercise, pt is dealing with depression, major , PTSD, feeling little better, pt works with specialists, possible bipolar as well.  Pt has no following issues:  No Fx of pancreatic ca or thyroid ca.  Pt has elevated BP as well, healthier diet help a lot.    No palpitations, no orthopnea, no edema of the legs, no dizziness, no syncope, no SOB.            Current Outpatient Medications   Medication Sig Dispense Refill    ZEPBOUND 2.5 MG/0.5ML Subcutaneous Solution Auto-injector Inject 0.5 mL into the skin once a week. 2 mL 1    NURTEC 75 MG Oral Tablet Dispersible DISSOLVE ONE TABLET BY MOUTH AS NEEDED AT THE ONSET OF MIGRAINE.  MAXIMUM DOSE IN 24 HOURS IS 1 TABLET (75MG). 8 tablet 2    escitalopram (LEXAPRO) 10 MG Oral Tab Take 1 tablet (10 mg total) by mouth every morning. 30 tablet 0    Dapsone (ACZONE) 7.5 % External Gel Apply 1 Application topically daily.      topiramate 25 MG Oral Tab Take 3 tablets (75 mg total) by mouth 2 (two) times daily. 540 tablet 1    allopurinol 300 MG Oral Tab Take 1.5 tablets (450 mg total) by mouth daily. 135 tablet 2    colchicine 0.6 MG Oral Tab Take 1 tablet (0.6 mg total) by mouth daily. 90 tablet 1    ergocalciferol 1.25 MG (76258 UT) Oral Cap Take 1 capsule (50,000 Units total) by mouth once a week. 4 capsule 3    Icosapent Ethyl (VASCEPA) 1 g Oral Cap Take 2 g by mouth in the morning and 2 g before bedtime. 360 capsule 0    albuterol 108 (90 Base) MCG/ACT Inhalation Aero Soln Inhale 2 puffs into the lungs every 4 (four) hours as needed for Wheezing. 1 each 0    pantoprazole (PROTONIX) 40 MG Oral Tab EC Take 1 tablet (40 mg total) by  mouth every morning before breakfast. 90 tablet 1    losartan 25 MG Oral Tab Take 1 tablet (25 mg total) by mouth daily. 90 tablet 1    Meloxicam 15 MG Oral Tab Take 1 tablet (15 mg total) by mouth in the morning. 30 tablet 2    atorvastatin 20 MG Oral Tab Take 1 tablet (20 mg total) by mouth nightly. 90 tablet 2    Albuterol Sulfate HFA (PROAIR HFA) 108 (90 Base) MCG/ACT Inhalation Aero Soln Inhale 2 puffs into the lungs every 6 (six) hours as needed for Wheezing. 1 Inhaler 2    aspirin 81 MG Oral Tab EC Take 1 tablet (81 mg total) by mouth daily.      Multiple Vitamin (MULTI-VITAMIN DAILY OR) Take 1 tablet by mouth daily.          Past Medical History:    Arrhythmia    a flutter after 4 th pregnancy and stabilized after medication. Recent dx w/ covid 9/2022    Asthma ([facility])    Calculus of kidney    ? 2005    Diabetes ([facility])    not on any medication    Extrinsic asthma, unspecified    Gout    High blood pressure    High cholesterol    History of COVID-19    covid penumonia, tachycardia + hospitalized sx's x 2 weeks.    History of COVID-19    fever x 3days , cough x 1 month  congestion x 1-2 weeks, covid pneumonia. + hospitalized    Migraine, unspecified, without mention of intractable migraine without mention of status migrainosus    Mitral valve disorders(424.0)    Morbid obesity with body mass index (BMI) of 50.0 to 59.9 in adult ([facility])    Neuropathy    SELINA (obstructive sleep apnea)    AHI 18 REM AHI 45 Supine AHI 49 Sao2 Freddy 69%     Osteoarthritis    right hand    Pneumonia due to organism    covid pna 12/2020 and 09/2022    PONV (postoperative nausea and vomiting)    Unspecified gastritis and gastroduodenitis without mention of hemorrhage    Visual impairment    glasses    Vitamin D deficiency      Patient Active Problem List   Diagnosis    Essential hypertension    Mixed hyperlipidemia    Intermittent asthma, well controlled ([facility])    Migraine headache    Carpal tunnel syndrome of right wrist    SELINA on CPAP     Subclinical hypothyroidism    Transaminitis    Idiopathic gout of multiple sites    Diabetic polyneuropathy associated with type 2 diabetes mellitus (HCC)    PTSD (post-traumatic stress disorder)    Current severe episode of major depressive disorder without psychotic features without prior episode (HCC)         REVIEW OF SYSTEMS:   GEN: no fatigue  RESPIRATORY: denies shortness of breath with exertion or rest  CARDIOVASCULAR: denies chest pain on exertion, palpitations.  SKIN: no acne, no skin infections, no fungal infections of the skin.  ENDO: no exercise thirst, polyphagia, no excessive sweating. No sexual dysfunction.  PSYCH: as above    EXAM:   There were no vitals taken for this visit.  GENERAL: well developed, well nourished,in no apparent distress  HEENT: atraumatic, normocephalic, throat is clear, PERRL  LUNGS: clear to auscultation  CARDIO: RRR without murmur  VS: no edema of the legs.  GI: good BS's, obese abdomen, no organomegaly.  PSYCH: normal mood, affect.    No orders of the defined types were placed in this encounter.       ASSESSMENT AND PLAN:   Diagnoses and all orders for this visit:    Primary hypertension  -     ZEPBOUND 2.5 MG/0.5ML Subcutaneous Solution Auto-injector; Inject 0.5 mL into the skin once a week.    Current moderate episode of major depressive disorder without prior episode (HCC)  -     ZEPBOUND 2.5 MG/0.5ML Subcutaneous Solution Auto-injector; Inject 0.5 mL into the skin once a week.    PTSD (post-traumatic stress disorder)  -     ZEPBOUND 2.5 MG/0.5ML Subcutaneous Solution Auto-injector; Inject 0.5 mL into the skin once a week.    Morbid obesity (HCC)     Discussed BMI target. Discussed calorie counting, diet, exercise, and one pound of weight loss per week by decreasing calorie intake by 500 calories per day.Handout given.  Requested Prescriptions     Signed Prescriptions Disp Refills    ZEPBOUND 2.5 MG/0.5ML Subcutaneous Solution Auto-injector 2 mL 1     Sig: Inject 0.5 mL  into the skin once a week.      Patient understood above plan and agreed.  Long discussion about all the issues above, pt will follow psychiatry recommendation.  All side effect of the medication d/w the pt, risk of pancreatitis, thyroid cancer risk, pancreatic cancer risk d/w the pt.

## 2024-06-05 NOTE — TELEPHONE ENCOUNTER
Zepbound PA completed in EPICwith OV notes attached was denied. Please advise on next step for pt.

## 2024-06-05 NOTE — TELEPHONE ENCOUNTER
Our [facility] does compound Rx, or private weight loss clinics like:        Dr. Neva Nevarez  Board certified obesity physician.  [address], [address]  Phone: [phone]

## 2024-06-12 ENCOUNTER — TELEPHONE (OUTPATIENT)
Dept: FAMILY MEDICINE CLINIC | Facility: CLINIC | Age: 49
End: 2024-06-12

## 2024-06-12 ENCOUNTER — PATIENT MESSAGE (OUTPATIENT)
Dept: FAMILY MEDICINE CLINIC | Facility: CLINIC | Age: 49
End: 2024-06-12

## 2024-06-12 NOTE — TELEPHONE ENCOUNTER
Sara from [facility], called stating they have no fax with notes.     Requesting a call to [phone]    I did go over TE from 06/04/244

## 2024-06-12 NOTE — TELEPHONE ENCOUNTER
Patient is calling for a status on the new PA for Zepbound.  Insurance is saying they faxed over another PA form yesterday.  She said Rashida spoke directly to Noelle richardson This issue.  She is asking for an update on this issue.  Please call as soon as possible and let her know.    She is saying that Dr. Villeda can call and bypass all the paperwork and faxing and get it approved over the phone:    [facility] [phone]

## 2024-06-12 NOTE — TELEPHONE ENCOUNTER
Spoke to [employer] [facility] for Zepbound prior authorization    Approved for 8 months June 12 2024 -Feb 12 2025  ID# [id number]

## 2024-06-13 ENCOUNTER — PATIENT MESSAGE (OUTPATIENT)
Dept: FAMILY MEDICINE CLINIC | Facility: CLINIC | Age: 49
End: 2024-06-13

## 2024-06-13 NOTE — TELEPHONE ENCOUNTER
Pt requesting work note stating she is in good physical condition & has no work restrictions based off her 12/27 Physical. Ok for note?

## 2024-06-13 NOTE — TELEPHONE ENCOUNTER
From: Baylee Granados  To: Yamile Villeda  Sent: 6/12/2024 2:37 PM CDT  Subject: Physical Letter    Good afternoon Dr. Villeda,     Would it be possible for me to obtain a letter verifying my previous physical that I had back in December 2023 stating that condition I am in good physical condition with no work restrictions?    How long do you think it will take in order for me to obtain this letter? And can the letter be sent to the [employer] portal or is this something that I have to come in to ?    Thank you very much and have a great day,  Baylee Granados

## 2024-06-13 NOTE — TELEPHONE ENCOUNTER
From: Baylee Granados  Sent: 6/13/2024 9:34 AM CDT  To: [facility] Clinical Staff  Subject: Proof of physical letter needed     Hello again,    I’m looking to start new employment and this letter of a physical being completed is needed for me to continue my application process. I surely would not want my opportunity for possible hire to be reconsidered.    Thank you so much,  Baylee Granados

## 2024-06-21 ENCOUNTER — TELEPHONE (OUTPATIENT)
Dept: INTERNAL MEDICINE CLINIC | Facility: CLINIC | Age: 49
End: 2024-06-21

## 2024-06-24 ENCOUNTER — OFFICE VISIT (OUTPATIENT)
Dept: INTERNAL MEDICINE CLINIC | Facility: CLINIC | Age: 49
End: 2024-06-24

## 2024-06-24 VITALS
WEIGHT: 293 LBS | BODY MASS INDEX: 45.45 KG/M2 | HEART RATE: 82 BPM | HEIGHT: 67.5 IN | DIASTOLIC BLOOD PRESSURE: 80 MMHG | SYSTOLIC BLOOD PRESSURE: 140 MMHG | RESPIRATION RATE: 16 BRPM

## 2024-06-24 DIAGNOSIS — E78.2 MIXED HYPERLIPIDEMIA: ICD-10-CM

## 2024-06-24 DIAGNOSIS — I10 ESSENTIAL HYPERTENSION: ICD-10-CM

## 2024-06-24 DIAGNOSIS — E11.42 DIABETIC POLYNEUROPATHY ASSOCIATED WITH TYPE 2 DIABETES MELLITUS (HCC): ICD-10-CM

## 2024-06-24 DIAGNOSIS — G47.33 OSA ON CPAP: ICD-10-CM

## 2024-06-24 DIAGNOSIS — Z51.81 THERAPEUTIC DRUG MONITORING: ICD-10-CM

## 2024-06-24 DIAGNOSIS — F32.2 CURRENT SEVERE EPISODE OF MAJOR DEPRESSIVE DISORDER WITHOUT PSYCHOTIC FEATURES WITHOUT PRIOR EPISODE (HCC): ICD-10-CM

## 2024-06-24 DIAGNOSIS — E66.01 OBESITY, MORBID, BMI 50 OR HIGHER (HCC): Primary | ICD-10-CM

## 2024-06-24 DIAGNOSIS — E03.8 SUBCLINICAL HYPOTHYROIDISM: ICD-10-CM

## 2024-06-24 PROCEDURE — 3008F BODY MASS INDEX DOCD: CPT | Performed by: NURSE PRACTITIONER

## 2024-06-24 PROCEDURE — 99215 OFFICE O/P EST HI 40 MIN: CPT | Performed by: NURSE PRACTITIONER

## 2024-06-24 PROCEDURE — 3079F DIAST BP 80-89 MM HG: CPT | Performed by: NURSE PRACTITIONER

## 2024-06-24 PROCEDURE — 3077F SYST BP >= 140 MM HG: CPT | Performed by: NURSE PRACTITIONER

## 2024-06-24 RX ORDER — LURASIDONE HYDROCHLORIDE 20 MG/1
TABLET, FILM COATED ORAL
COMMUNITY
Start: 2024-06-20

## 2024-06-24 RX ORDER — ADAPALENE GEL USP, 0.3% 3 MG/G
GEL TOPICAL
COMMUNITY
Start: 2024-06-11

## 2024-06-24 RX ORDER — ALPRAZOLAM 0.25 MG/1
TABLET ORAL
COMMUNITY
Start: 2024-06-14

## 2024-06-24 RX ORDER — GABAPENTIN 100 MG/1
CAPSULE ORAL
COMMUNITY
Start: 2024-06-18

## 2024-06-24 RX ORDER — TIRZEPATIDE 5 MG/.5ML
5 INJECTION, SOLUTION SUBCUTANEOUS WEEKLY
Qty: 2 ML | Refills: 1 | Status: SHIPPED | OUTPATIENT
Start: 2024-06-24

## 2024-06-24 NOTE — PROGRESS NOTES
HISTORY OF PRESENT ILLNESS  Chief Complaint   Patient presents with    Weight Problem     Recommendation from  PCP,  on Zepbound 2.5mg and took phych meds the same time so she don't feels anything.  and open for med, Nervous do it  in getting surgery       Baylee Granados is a 48 year old female here for follow up with medical weight loss program for the treatment of overweight, obesity, or morbid obesity.     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 not tracking reports  Meals out per week on average: 10/10  Stress is manageable   Sleep: 7 hours/night, waking up feeling rested    Denies chest pain, shortness of breath, dizziness, blurred vision, headache, paresthesia, nausea/vomiting.     Breakfast Lunch Dinner Snacks Fluids   Reviewed              Wt Readings from Last 6 Encounters:   06/24/24 (!) 340 lb (154.2 kg)   02/15/24 (!) 329 lb (149.2 kg)   01/04/24 (!) 332 lb (150.6 kg)   12/27/23 (!) 329 lb (149.2 kg)   11/15/23 (!) 325 lb (147.4 kg)   08/10/23 (!) 339 lb (153.8 kg)          REVIEW OF SYSTEMS  GENERAL: feels well otherwise, denied any fevers chills or night sweats   LUNGS: denies shortness of breath  CARDIOVASCULAR: denies chest pain  GI: denies abdominal pain  MUSCULOSKELETAL:+back pain, +joint pains   PSYCH: denies change in behavior or mood, hx of anxiety  and depression     EXAM  /80   Pulse 82   Resp 16   Ht 5' 7.5\" (1.715 m)   Wt (!) 340 lb (154.2 kg)   BMI 52.47 kg/m²       GENERAL: well developed, well nourished, in no apparent distress, A/O x3  SKIN: no rashes, no suspicious lesions  HEENT: atraumatic, normocephalic, OP-clear, PERRLA  NECK: supple, no adenopathy  LUNGS: CTA in all fields, breathing non labored  CARDIO: RRR without murmur  GI: +BS, NT/ND, no masses or HSM  EXTREMITIES: no cyanosis, no clubbing, no edema, no si/hi    Lab Results   Component Value Date     (H) 12/27/2023    BUN 13 12/27/2023    BUNCREA 16.7 07/29/2021    CREATSERUM 1.08 (H) 12/27/2023    ANIONGAP 5 12/27/2023    GFR 78 01/03/2018    GFRNAA 56 (L) 07/05/2022    GFRAA 65 07/05/2022    CA 9.2 12/27/2023    OSMOCALC 290 12/27/2023    ALKPHO 95 12/27/2023    AST 32 12/27/2023    ALT 51 12/27/2023    BILT 1.1 12/27/2023    TP 7.1 12/27/2023    ALB 3.6 12/27/2023    GLOBULIN 3.5 12/27/2023    AGRATIO 1.4 04/27/2013     12/27/2023    K 3.7 12/27/2023     12/27/2023    CO2 28.0 12/27/2023     Lab Results   Component Value Date     12/27/2023    A1C 6.0 (H) 12/27/2023     Lab Results   Component Value Date    CHOLEST 211 (H) 12/27/2023    TRIG 419 (H) 12/27/2023    HDL 41 12/27/2023     (H) 12/27/2023    VLDL 71 (H) 12/27/2023    TCHDLRATIO 5.56 (H) 01/03/2018    NONHDLC 170 (H) 12/27/2023     Lab Results   Component Value Date    B12 535 10/18/2022     Lab Results   Component Value Date    VITD 9.6 (L) 12/27/2023       Current Outpatient Medications on File Prior to Visit   Medication Sig Dispense Refill    Adapalene 0.3 % External Gel Pea size amount to the entire face starting every third night building skin up to nightly use      ALPRAZolam 0.25 MG Oral Tab TAKE ONE TABLET BY MOUTH UP TO THREE TIMES A DAY AS NEEDED FOR ANXIETY OR PANIC ATTACK      gabapentin 100 MG Oral Cap Take one oral capsule up to three times daily (max daily dose is three  capsules or 300mg)      lurasidone 20 MG Oral Tab       ZEPBOUND 2.5 MG/0.5ML Subcutaneous Solution Auto-injector Inject 0.5 mL into the skin once a week. 2 mL 1    NURTEC 75 MG Oral Tablet Dispersible DISSOLVE ONE TABLET BY MOUTH AS NEEDED AT THE ONSET OF MIGRAINE.  MAXIMUM DOSE IN 24 HOURS IS 1 TABLET (75MG). 8 tablet 2    escitalopram (LEXAPRO) 10 MG Oral Tab Take 1 tablet (10 mg total) by mouth every morning. 30 tablet 0    Dapsone (ACZONE) 7.5 % External Gel Apply 1 Application topically daily.      topiramate 25 MG Oral Tab Take 3 tablets (75 mg total) by mouth 2 (two) times daily. 540 tablet 1    allopurinol 300 MG Oral Tab Take 1.5 tablets (450 mg total) by mouth daily. 135 tablet 2    colchicine 0.6 MG Oral Tab Take 1 tablet (0.6 mg total) by mouth daily. 90 tablet 1    ergocalciferol 1.25 MG (23002 UT) Oral Cap Take 1 capsule (50,000 Units total) by mouth once a week. 4 capsule 3    albuterol 108 (90 Base) MCG/ACT Inhalation Aero Soln Inhale 2 puffs into the lungs every 4 (four) hours as needed for Wheezing. 1 each 0    pantoprazole (PROTONIX) 40 MG Oral Tab EC Take 1 tablet (40 mg total) by mouth every morning before breakfast. 90 tablet 1    losartan 25 MG Oral Tab Take 1 tablet (25 mg total) by mouth daily. 90 tablet 1    Meloxicam 15 MG Oral Tab Take 1 tablet (15 mg total) by mouth in the morning. 30 tablet 2    atorvastatin 20 MG Oral Tab Take 1 tablet (20 mg total) by mouth nightly. 90 tablet 2    aspirin 81 MG Oral Tab EC Take 1 tablet (81 mg total) by mouth daily.      Multiple Vitamin (MULTI-VITAMIN DAILY OR) Take 1 tablet by mouth daily.         No current facility-administered medications on file prior to visit.       ASSESSMENT/PLAN    ICD-10-CM    1. Obesity, morbid, BMI 50 or higher (HCC)  E66.01 Tirzepatide-Weight Management (ZEPBOUND) 5 MG/0.5ML Subcutaneous Solution Auto-injector      2. Therapeutic drug monitoring  Z51.81 Tirzepatide-Weight Management (ZEPBOUND) 5 MG/0.5ML Subcutaneous  Solution Auto-injector      3. Essential hypertension  I10       4. Mixed hyperlipidemia  E78.2       5. SELINA on CPAP  G47.33       6. Subclinical hypothyroidism  E03.8           PLAN   Initial Weight Data and Goal Weight Loss:  Initial consult: # 331lbs on 3/31/2021  Weight Calculations  Initial Weight: 340 lbs  Initial Weight Date: 03/31/21  Today's Weight: 340 lbs  5% Goal: 17  10% Goal: 34  Total Weight Loss: 0 lbs  Total weight loss: up #15 lbs total, Net loss +9 lbs  Will increase medications: zepbound 5mg weekly (will take over from PCP)   Continue with medications: topamax (from neuro?)  --advised of side effects and adverse effects of this medication  Contradictions: phentermine- stopped due to side effects, diethylpropion, ozempic, contrave, metformin   Reviewed labs   Continue with vitamin d (high dose with PCP)   HTN  blood pressure is in office is stable - continue present management  HLD  uncontrolled, (high triglycerides)  on current medication regimen, continue to follow-up with PCP  DM type 2, reviewed last a1c 6.0% on 12/2023  SELINA, on CPAP nightly   Joint pain, recommended aqua therapy   Subclinical hypothyroid, not currently taking any medications, managed by PCP   Nutrition: Low carb diet, recommended to eat breakfast daily/ regular protein intake  Follow up with dietitian and psychologist as recommended.  Discussed the role of sleep and stress in weight management.  Counseled on comprehensive weight loss plan including attention to nutrition, exercise and behavior/stress management for success. See patient instruction below for more details.  Discussed strategies to overcome barriers to successful weight loss and weight maintenance  FITTE: ACSM recommendations (150-300 minutes/ week in active weight loss)   Weight Loss Consent to treat reviewed and signed.    Total time spent on chart review, pre-charting, obtaining history, counseling, and educating, reviewing labs was 45 minutes.       NOTE TO  PATIENT: The 21st Century Cures Act makes clinical notes like these available to patients in the interest of transparency. Clinical notes are medical documents used by physicians and care providers to communicate with each other. These documents include medical language and terminology, abbreviations, and treatment information that may sound technical and at times possibly unfamiliar. In addition, at times, the verbiage may appear blunt or direct. These documents are one tool providers use to communicate relevant information and clinical opinions of the care providers in a way that allows common understanding of the clinical context.     Patient Instructions   Welcome to the [facility] Management Program!!  Thank you for placing your trust in our health care team, I look forward to working with you along this journey to better health!  Next steps:     1.  Schedule a personal nutrition consultation with one of our registered dieticians. Bring along your food journal (3 days minimum). See journal options below.  2.  Fill your prescribed medication and take as discussed and prescribed: zepbound 5mg weekly   3. Check out aqua therapy      Please try to work on the following dietary changes this first month:  Daily protein recommendation to start:  grams  Daily carbohydrate: <180g  Daily calories: 2,100-2,200  1.  Drink water with meals and throughout the day, cut down on soda and/or juice if consumed. Consider flavored water options like Bubbly, Spindrift, Hint and Lois.  2.  Eat breakfast daily and focus on having protein with each meal, examples include: greek yogurt, cottage cheese, hard boiled egg, whole grain toast with peanut butter.   3.  Reduce refined carbohydrates and sugars which includes items such as sweets, as well as rice, pasta, and bread and make sure to choose whole grain options when having them with just 1 serving per meal about the size of your inner palm.  4.  Consume non  starchy veggies daily working towards making them a good 50% of your daily food intake. Add them to lunch and dinner consistently.  5.  Optional can start a daily probiotic: VSL#3, (order on line at www.vsl3.com). Take 1 capsule daily with water for 30 days, then reduce to 1 every other day (this will reduce the cost). Capsules can be left out for 2 weeks, but then must be refrigerated.      Please download eva My Fitness Pal, LoseIt! Or Net Diary to monitor daily dietary intake and you will be able to see if you are eating the right amount of calories or too much or too little which would hinder weight loss. Additionally this will help to see your daily carbohydrate and protein intake. When you set the eva up choose 1-2 lbs/week as a goal.  Keeping a paper food journal is an option as well to remain accountable for your choices- this is the start to mindful eating! A low calorie diet has been consistently shown to support weight loss.     Continue or start exercising to help establish a routine. If not already exercising begin with 1 day and progress as able with long-term goal of 30 minutes 5 days a week at a minimum.     Meditation daily can help manage and control stress. Chronic stress can make weight loss difficult.  Exercising is one way to help with stress, but meditation using the CALM Eva or another comparable alternative can be done in your home or place of work with little time commitment. This Eva can also help work on behavior change and improve sleep. Check out the segment under Calm Masterclass and listen to The 4 Pillars of Health. A great way to begin learning about the foundation of lifestyle with practical tips to use in your every day.     Check out www.yourweightmatters.org blog for continued daily support and education along this weight loss journey!    Patient Resources:     Personal Training/Fitness Classes/Health Coaching     [facility] @  [email or web address] Full fitness center with group fitness and personal training. Discount available as client of [employer] [facility].  Health Coaching and Personal Training with Becky Solitario at our [facility]- individual weekly coaching with option to add personal training and small group fitness classes targeted at weight loss- [phone] and/or email @ [email or web address]  360FIT [place] [email or web address] Group Fitness [phone] and/or email Brynn at [email or web address]  [facility] Fitness Centers with multiple locations: Business Insider (www.Tinybeans), CoCollage (www.GoNogging), ZapMe (www.Bitmenu), The Exercise  (www.exercisecoach.Employma)     Online Fitness  Fitness  on Sen[place]  Fit in 10 DVD series- wwwEndraD.com  Sit and Be Fit - Chair exercise series Www.sitandbefit.Lifeshare Technologies  Hip Hop Fit with Pancho Baltazar at www.hiphopfit.net     Apps for on the Go Fitness  Voxeet 7 Minute Workout (orange box with white 7) - free on the go HIIT training eva  Peloton Eva @ www.onepeloton.com     Nutrition Trackers and Tools  LoseIT! And My Fitness Pal apps and on line for tracking nutrition  NOOM - virtual health coaching  FitFoundation (healthy meals on the go) in [place] @ www.rseggfgxszmsh4t.Employma  Ida ALMANZA @ www.bistromd.com and Vmxduw71 (keto and low carb plans recommended) @ www.uxmxzg40.com, Metabolic Meals @ www.MyMetabolicMeals.com - individual prepared meals to go  Gobble, Blue Apron, Home , Every Plate, Sunbasket- on line meal delivery programs for preparation at home  Meal Village in [place] for homemade meals to go @ www.mealvillage.com  Diet Doctor @ www.dietdoctor.com - low carb swaps  YuTwelve - meal prep and planning eva (www.yummly.com)     Stress Management/Behavior/Mindful Eating  CALM meditation eva (www.calm.com)  Headspace  Am I Hungry?  Mindful eating virtual  carolin  Www.yourweightmatters.org - Obesity Action Coalition sponsored Blog posts daily  Motivation carolin (black box with white \")- daily supportive messages sent to your phone     Books/Video Education/Podcasts  Mindless Eating by Michael Frye  Why We Get Sick by Mohsen Higginbotham (a book about insulin resistance)  Atomic Habits by Oz Faulkner (a book about taking small steps to promote greater behavior change)   Can't Hurt Me by Tj Lala (a book exploring the power of discipline in achieving your goals)  The End of Dieting: How to Live for Life by Dr. Vladimir Olsen M.D. or listen to The REACH [facility] Podcast Episode 63: Understanding \"Nutritarian\" Eating w/Dr. Vladimir Olsen  Your Body in Balance: The New Science of Food, Hormones, and Health by Dr. Kalin Vásquez  The Menopause Diet Plan by Jasmin Umana and Sara uFentes  The Complete Guide to fasting by Dr. Duron  Sugar, Salt & Fat by Katty Herrera, Ph.D, R.D.  Weight Loss Surgery Will Not Treat Food Addiction by Olga Wheeler Ph.D  The Game Changers- mySkin Documentary on plant based nutrition  Fed Up - documentary about obesity (Free on Bluesocket)  The Truth About Sugar - documentary on sugar (Free on Bluesocket, https://youRunic Gamesu.be/9N7odejTC2z)  The Dr. Duque T5 Wellness Plan by Dr. Javier Duque MD  Fitlosophy Fitspiration - journal to better health (found at [employer] in fitness aisle)  What Happened to You?- a look at the impact trauma has on behavior written by Lui Membreno and Dr. Gustavo Marin  Whole Again by Larry Su - discovering your true self after trauma  Santiago Do talk on mySkin, The Call to Courage  Podcasts: The Exam Room by the Physician's Committee, Nutrition Facts by Dr. Dwyer    We are here to support you with weight loss, but please remember that you still need your primary care provider for your routine health maintenance.      No follow-ups on file.    Patient verbalizes understanding.    Alexandra Austin,  APRN

## 2024-06-24 NOTE — PATIENT INSTRUCTIONS
Welcome to the [facility] Management Program!!  Thank you for placing your trust in our health care team, I look forward to working with you along this journey to better health!  Next steps:     1.  Schedule a personal nutrition consultation with one of our registered dieticians. Bring along your food journal (3 days minimum). See journal options below.  2.  Fill your prescribed medication and take as discussed and prescribed: zepbound 5mg weekly   3. Check out aqua therapy      Please try to work on the following dietary changes this first month:  Daily protein recommendation to start:  grams  Daily carbohydrate: <180g  Daily calories: 2,100-2,200  1.  Drink water with meals and throughout the day, cut down on soda and/or juice if consumed. Consider flavored water options like Bubbly, Spindrift, Hint and Lois.  2.  Eat breakfast daily and focus on having protein with each meal, examples include: greek yogurt, cottage cheese, hard boiled egg, whole grain toast with peanut butter.   3.  Reduce refined carbohydrates and sugars which includes items such as sweets, as well as rice, pasta, and bread and make sure to choose whole grain options when having them with just 1 serving per meal about the size of your inner palm.  4.  Consume non starchy veggies daily working towards making them a good 50% of your daily food intake. Add them to lunch and dinner consistently.  5.  Optional can start a daily probiotic: VSL#3, (order on line at www.vsl3.com). Take 1 capsule daily with water for 30 days, then reduce to 1 every other day (this will reduce the cost). Capsules can be left out for 2 weeks, but then must be refrigerated.      Please download carolin My Fitness Pal, LoseIt! Or Net Diary to monitor daily dietary intake and you will be able to see if you are eating the right amount of calories or too much or too little which would hinder weight loss. Additionally this will help to see your daily  carbohydrate and protein intake. When you set the eva up choose 1-2 lbs/week as a goal.  Keeping a paper food journal is an option as well to remain accountable for your choices- this is the start to mindful eating! A low calorie diet has been consistently shown to support weight loss.     Continue or start exercising to help establish a routine. If not already exercising begin with 1 day and progress as able with long-term goal of 30 minutes 5 days a week at a minimum.     Meditation daily can help manage and control stress. Chronic stress can make weight loss difficult.  Exercising is one way to help with stress, but meditation using the CALM Eva or another comparable alternative can be done in your home or place of work with little time commitment. This Eva can also help work on behavior change and improve sleep. Check out the segment under Calm Masterclass and listen to The 4 Pillars of Health. A great way to begin learning about the foundation of lifestyle with practical tips to use in your every day.     Check out www.yourweightmatters.org blog for continued daily support and education along this weight loss journey!    Patient Resources:     Personal Training/Fitness Classes/Health Coaching     [facility] @ [email or web address]-center Full fitness center with group fitness and personal training. Discount available as client of [employer] [facility].  Health Coaching and Personal Training with Becky Solitario at our [facility]- individual weekly coaching with option to add personal training and small group fitness classes targeted at weight loss- [phone] and/or email @ [email or web address]  360FIT [place] [email or web address]. Group Fitness [phone] and/or email Brynn at [email or web address]  [facility] Fitness Centers with multiple locations: Bookmycab (www.Redis Labs), Eat The Frog  Fitness (www.UV Memory Care.Thinker Thing), Nubee Fitness (www.Transonic Combustion.Thinker Thing), The Exercise  (www.exercisecoach.Thinker Thing)     Online Fitness  Fitness  on Utube  Fit in 10 DVD series- www.tcduo44HJI.com  Sit and Be Fit - Chair exercise series Www.sitandbefit.org  Hip Hop Fit with Pancho Baltazar at www.hiphopfit.net     Apps for on the Go Fitness  [place] 7 Minute Workout (orange box with white 7) - free on the go HIIT training eva  Peloton Eva @ www.onepeloton.com     Nutrition Trackers and Tools  LoseIT! And My Fitness Pal apps and on line for tracking nutrition  NOOM - virtual health coaching  FitFoundation (healthy meals on the go) in [place] @ wwwWinestyrxkpoofvzeiwvz6wTrendlr  Ida ALMANZA @ Friends Aroundbistromd.com and Vmvvit56 (keto and low carb plans recommended) @ www.jpfvbx03.com, Metabolic Meals @ www.Online Milestone PlatformMetabolicMeals.Thinker Thing - individual prepared meals to go  Gobble, Blue Apron, Home , Every Plate, Sunbasket- on line meal delivery programs for preparation at home  Meal Village in [place] for homemade meals to go @ www.Brew Solutions.Thinker Thing  Diet Doctor @ www.dietdoctor.com - low carb swaps  YummAsterion - meal prep and planning eva (www.yummly.com)     Stress Management/Behavior/Mindful Eating  CALM meditation eva (www.calm.com)  Headspace  Am I Hungry? Mindful eating virtual  eva  Www.yourweightmatters.org - Obesity Action Coalition sponsored Blog posts daily  Motivation eva (black box with white \")- daily supportive messages sent to your phone     Books/Video Education/Podcasts  Mindless Eating by Michael Frye  Why We Get Sick by Mohsen Higginbotham (a book about insulin resistance)  Atomic Habits by Oz Faulkner (a book about taking small steps to promote greater behavior change)   Can't Hurt Me by Tj Lala (a book exploring the power of discipline in achieving your goals)  The End of Dieting: How to Live for Life by Dr. Vladimir Olsen M.D. or listen to The [place] Podcast Episode 63: Understanding \"Nutritarian\"  Eating w/Dr. Vladimir Olsen  Your Body in Balance: The New Science of Food, Hormones, and Health by Dr. Kalin Vásquez  The Menopause Diet Plan by Jasmin Umana and Sara Fuentes  The Complete Guide to fasting by Dr. Duron  Sugar, Salt & Fat by Katty Herrera, Ph.D, R.D.  Weight Loss Surgery Will Not Treat Food Addiction by Olga Wheeler Ph.D  The Game Changers- Game Face Hockeyix Documentary on plant based nutrition  Fed Up - documentary about obesity (Free on Utube)  The Truth About Sugar - documentary on sugar (Free on Utube, https://youtu.be/6U1mprzLD3t)  The Dr. Duque T5 Wellness Plan by Dr. Javier Duque MD  Fitlosophy Fitspiration - journal to better health (found at [employer] in fitness aisle)  What Happened to You?- a look at the impact trauma has on behavior written by Lui Membreno and Dr. Gustavo Marin  Whole Again by Larry Su - discovering your true self after trauma  Santiago Do talk on Kuailexue, The Call to Courage  Podcasts: The Exam Room by the Physician's Committee, Nutrition Facts by Dr. Dwyer    We are here to support you with weight loss, but please remember that you still need your primary care provider for your routine health maintenance.

## 2024-07-15 RX ORDER — LOSARTAN POTASSIUM 25 MG/1
25 TABLET ORAL DAILY
Qty: 90 TABLET | Refills: 0 | Status: SHIPPED | OUTPATIENT
Start: 2024-07-15

## 2024-08-01 ENCOUNTER — OFFICE VISIT (OUTPATIENT)
Dept: RHEUMATOLOGY | Facility: CLINIC | Age: 49
End: 2024-08-01
Payer: COMMERCIAL

## 2024-08-01 VITALS
OXYGEN SATURATION: 98 % | TEMPERATURE: 97 F | WEIGHT: 293 LBS | HEIGHT: 67.5 IN | BODY MASS INDEX: 45.45 KG/M2 | HEART RATE: 94 BPM | SYSTOLIC BLOOD PRESSURE: 120 MMHG | RESPIRATION RATE: 16 BRPM | DIASTOLIC BLOOD PRESSURE: 76 MMHG

## 2024-08-01 DIAGNOSIS — M79.18 CHRONIC MUSCULOSKELETAL PAIN: ICD-10-CM

## 2024-08-01 DIAGNOSIS — M1A.09X0 IDIOPATHIC CHRONIC GOUT OF MULTIPLE SITES WITHOUT TOPHUS: Primary | ICD-10-CM

## 2024-08-01 DIAGNOSIS — G89.29 CHRONIC MUSCULOSKELETAL PAIN: ICD-10-CM

## 2024-08-01 DIAGNOSIS — I87.2 VENOUS INSUFFICIENCY: ICD-10-CM

## 2024-08-01 DIAGNOSIS — G62.9 NEUROPATHY: ICD-10-CM

## 2024-08-01 DIAGNOSIS — Z51.81 THERAPEUTIC DRUG MONITORING: ICD-10-CM

## 2024-08-01 DIAGNOSIS — E11.42 DIABETIC POLYNEUROPATHY ASSOCIATED WITH TYPE 2 DIABETES MELLITUS (HCC): ICD-10-CM

## 2024-08-01 PROCEDURE — 3008F BODY MASS INDEX DOCD: CPT | Performed by: INTERNAL MEDICINE

## 2024-08-01 PROCEDURE — 3074F SYST BP LT 130 MM HG: CPT | Performed by: INTERNAL MEDICINE

## 2024-08-01 PROCEDURE — 3078F DIAST BP <80 MM HG: CPT | Performed by: INTERNAL MEDICINE

## 2024-08-01 PROCEDURE — 99214 OFFICE O/P EST MOD 30 MIN: CPT | Performed by: INTERNAL MEDICINE

## 2024-08-01 NOTE — PROGRESS NOTES
Rheumatology Follow-up Patient Note  =====================================================================================================  ?  Chief Complaint:   Gout    Chief Complaint   Patient presents with    Follow - Up     Patient comes into the office today for a f/u apt with Dr for Idiopathic chronic gout of multiple sites. Patient states Patient states that she has had some really bad days but over the last two or three weeks she has seen an improvement. She states that she did the [facility] outpatient program and she is feeling somewhat better now. Rapid 3 converted to a 1.3     PCP  Yamile Villeda MD  Fax: [phone]  Phone: [phone]  =====================================================================================================  HPI    Baylee Granados is a 48 year old female   ==============================================================================================================  Visit: 01/04/24  Migraines: better, on nurtec prn and topamax. Still active today.  -took ibuprofen 800 mg which helped.   -doing prn nursing work now. Off work for 6 months.   -right foot swelling intermittent.  Gout episodes last for couple days before resolving  Continues on allopurinol 200 mg daily, colchicine 0.6 mg daily, Cymbalta 60 mg daily  -Still with leg swelling, worse at night.  Worse in the left leg  -Paresthesias in the bilateral feet persist better with Cymbalta  ==============================================================================================================  Today's Visit: 08/01/24    Went to [facility] outpatient program for mental health issues. Doing better.   -will return to work sooner.  -Migraines more common.   -taking allopurinol 450 mg daily and colchicine 0.6 mg daily.  More adhered recently.  -no recent gout flares.  -on zepbound  -off cymbalta, on gabapentin that she takes 100 mg 4 times daily.  Still with neuropathy in the base of the feet from time to  time  -Youngest son, high school senior.  Waiting football offers from [employer] and [employer]    Wt Readings from Last 6 Encounters:   08/01/24 (!) 340 lb (154.2 kg)   06/24/24 (!) 340 lb (154.2 kg)   02/15/24 (!) 329 lb (149.2 kg)   01/04/24 (!) 332 lb (150.6 kg)   12/27/23 (!) 329 lb (149.2 kg)   11/15/23 (!) 325 lb (147.4 kg)       5 point ROS negative except noted above  I had reviewed past medical and family histories together with allergy and medication lists documented.      Medications:  Outpatient Medications Marked as Taking for the 8/1/24 encounter (Office Visit) with Rohit Her MD   Medication Sig Dispense Refill    LOSARTAN 25 MG Oral Tab Take 1 tablet (25 mg total) by mouth daily. 90 tablet 0    Adapalene 0.3 % External Gel Pea size amount to the entire face starting every third night building skin up to nightly use      ALPRAZolam 0.25 MG Oral Tab TAKE ONE TABLET BY MOUTH UP TO THREE TIMES A DAY AS NEEDED FOR ANXIETY OR PANIC ATTACK      gabapentin 100 MG Oral Cap Take 1 capsule (100 mg total) by mouth. Can take up to 4 capsules daily      lurasidone 20 MG Oral Tab Take 1 tablet (20 mg total) by mouth in the morning and 1 tablet (20 mg total) before bedtime.      Tirzepatide-Weight Management (ZEPBOUND) 5 MG/0.5ML Subcutaneous Solution Auto-injector Inject 5 mg into the skin once a week. 2 mL 1    NURTEC 75 MG Oral Tablet Dispersible DISSOLVE ONE TABLET BY MOUTH AS NEEDED AT THE ONSET OF MIGRAINE.  MAXIMUM DOSE IN 24 HOURS IS 1 TABLET (75MG). 8 tablet 2    escitalopram (LEXAPRO) 10 MG Oral Tab Take 1 tablet (10 mg total) by mouth every morning. (Patient taking differently: Take 0.5 tablets (5 mg total) by mouth every morning. 1/2 tablet in the AM) 30 tablet 0    Dapsone (ACZONE) 7.5 % External Gel Apply 1 Application topically daily.      topiramate 25 MG Oral Tab Take 3 tablets (75 mg total) by mouth 2 (two) times daily. 540 tablet 1    allopurinol 300 MG Oral Tab Take 1.5 tablets (450 mg  total) by mouth daily. 135 tablet 2    colchicine 0.6 MG Oral Tab Take 1 tablet (0.6 mg total) by mouth daily. 90 tablet 1    ergocalciferol 1.25 MG (51522 UT) Oral Cap Take 1 capsule (50,000 Units total) by mouth once a week. 4 capsule 3    albuterol 108 (90 Base) MCG/ACT Inhalation Aero Soln Inhale 2 puffs into the lungs every 4 (four) hours as needed for Wheezing. 1 each 0    pantoprazole (PROTONIX) 40 MG Oral Tab EC Take 1 tablet (40 mg total) by mouth every morning before breakfast. 90 tablet 1    atorvastatin 20 MG Oral Tab Take 1 tablet (20 mg total) by mouth nightly. 90 tablet 2    aspirin 81 MG Oral Tab EC Take 1 tablet (81 mg total) by mouth daily.      Multiple Vitamin (MULTI-VITAMIN DAILY OR) Take 1 tablet by mouth daily.         Modified Medications    No medications on file     Medications Discontinued During This Encounter   Medication Reason    ZEPBOUND 2.5 MG/0.5ML Subcutaneous Solution Auto-injector Dose adjustment     ?  Allergies:  Allergies   Allergen Reactions    Darvocet [Propoxyphene N-Apap] HIVES     N 50 TABS    Demerol HIVES     TABS    Grape ANAPHYLAXIS    Hydrocodone-Acetaminophen OTHER (SEE COMMENTS)     ASA TABS  ASA TABS    Lortab HIVES     ASA TABS    Morphine HIVES     Derivatives      Propoxyphene OTHER (SEE COMMENTS)     N 50 TABS  N 50 TABS    Metformin DIARRHEA         Objective    Vitals:    08/01/24 1017   BP: 120/76   Pulse: 94   Resp: 16   Temp: 97.3 °F (36.3 °C)   SpO2: 98%   Weight: (!) 340 lb (154.2 kg)   Height: 5' 7.5\" (1.715 m)     GEN: NAD, well-nourished.   HEENT: Head: NCAT. Face: No lesions. Eyes: Conjunctiva clear.   PULM:  easy effort  Extremities: No cyanosis, edema or deformities.   Neurologic: Strength, CN2-12 grossly intact   Skin: No lesions or rashes.  MSK: 28 joint count performed. No evidence of synovitis in mcp, pip, dip, wrist, elbows, shoulders, hips, knees, ankles, mtp unless otherwise noted. Full ROM of elbows, wrists, knees.     Bilateral MTP  fullness, no tenderness  Labs:    Additional Labs:  12/2023  Vitamin D 9.6  TSH WNL    Lab Results   Component Value Date     12/27/2023     12/30/2022     (H) 12/16/2022    CK 2,132 (H) 12/13/2022     07/05/2022     07/29/2021    CK 97 11/09/2020         Lab Results   Component Value Date    URIC 6.7 (H) 12/27/2023    URIC 6.1 (H) 12/30/2022    URIC 6.8 (H) 12/13/2022    URIC 4.5 07/05/2022    URIC 7.3 (H) 07/29/2021    URIC 6.9 (H) 07/07/2021    URIC 9.1 (H) 01/27/2021    URIC 8.3 (H) 05/28/2020     Lab Results   Component Value Date    WBC 5.7 12/27/2023    RBC 4.88 12/27/2023    HGB 13.1 12/27/2023    HCT 41.3 12/27/2023    .0 12/27/2023    MCV 84.6 12/27/2023    MCH 26.8 12/27/2023    MCHC 31.7 12/27/2023    RDW 13.2 12/27/2023    NEPRELIM 2.86 12/27/2023    NEPERCENT 50.1 12/27/2023    LYPERCENT 39.5 12/27/2023    MOPERCENT 6.7 12/27/2023    EOPERCENT 2.1 12/27/2023    BAPERCENT 1.1 12/27/2023    NE 2.86 12/27/2023    LYMABS 2.25 12/27/2023    MOABSO 0.38 12/27/2023    EOABSO 0.12 12/27/2023    BAABSO 0.06 12/27/2023     Lab Results   Component Value Date     (H) 12/27/2023    BUN 13 12/27/2023    BUNCREA 16.7 07/29/2021    CREATSERUM 1.08 (H) 12/27/2023    ANIONGAP 5 12/27/2023    GFR 78 01/03/2018    GFRNAA 56 (L) 07/05/2022    GFRAA 65 07/05/2022    CA 9.2 12/27/2023    OSMOCALC 290 12/27/2023    ALKPHO 95 12/27/2023    AST 32 12/27/2023    ALT 51 12/27/2023    BILT 1.1 12/27/2023    TP 7.1 12/27/2023    ALB 3.6 12/27/2023    GLOBULIN 3.5 12/27/2023    AGRATIO 1.4 04/27/2013     12/27/2023    K 3.7 12/27/2023     12/27/2023    CO2 28.0 12/27/2023 7/2022:  RF/CCP neg    7/2021  [id number] neg    Radiology:    Radiology review:      =====================================================================================================  Assessment and Plan    Assessment:  1. Idiopathic chronic gout of multiple sites without tophus    2. Diabetic  polyneuropathy associated with type 2 diabetes mellitus (HCC)    3. Therapeutic drug monitoring    4. Venous insufficiency    5. Chronic musculoskeletal pain      #Gout: Hx of left podagra, left ankle swelling, diffuse left foot swelling, left Achilles tendinitis with probable involvement of the right MTPs as well, ?right MCP2 involvement  -No recent gout flares, however swelling of the bilateral feet/MTPs still raises some concern for chronic MSU deposition.  -Last serum urate not at goal    # Bilateral lower extremity edema: d/t venous insufficiency.  Negative duplex Dopplers in the past.       #Diabetic neuropathy, chronic musculoskeletal pain:   Improved with Cymbalta in the past.  -Now off Cymbalta given on Lexapro per psych.  -Partially controlled with gabapentin 100 mg 4 times daily as needed    High risk medication labs including CMP reviewed from 12/2023. Results are stable. [id number] neg    Plan:  Gout:  -repeat comprehensive metabolic panel (CMP) and uric acid now.   -Allopurinol 300 mg: continue 1.5 pills daily to equal 450 mg allopurinol.  This should get serum urate to target.  -continue colchicine 0.6 mg daily; consider stopping at next appointment. Repeat CK for monitoring.    Neuropathy:  Continue gabapentin 100 mg 4 times daily; could increase if needed  Get repeat neuropathy labs.  B12/folate, vitamin B6    Low vitamin D in the past, repeat vitamin D    Get Shingrix vaccine:     A total of 25 minutes were spent face-to-face with the patient during this encounter and over half of that time was spent on counseling and coordination of care.        Diagnoses and all orders for this visit:    Idiopathic chronic gout of multiple sites without tophus  -     CK (Creatine Kinase) (Not Creatinine); Future  -     Uric Acid; Future  -     CBC With Differential With Platelet; Future  -     Comp Metabolic Panel (14); Future  -     Vitamin D; Future  -     TSH W Reflex To Free T4; Future  -     Vitamin B6;  Future    Diabetic polyneuropathy associated with type 2 diabetes mellitus (HCC)  -     CK (Creatine Kinase) (Not Creatinine); Future  -     Uric Acid; Future  -     CBC With Differential With Platelet; Future  -     Comp Metabolic Panel (14); Future  -     Vitamin D; Future  -     TSH W Reflex To Free T4; Future  -     Vitamin B6; Future    Therapeutic drug monitoring  -     CK (Creatine Kinase) (Not Creatinine); Future  -     Uric Acid; Future  -     CBC With Differential With Platelet; Future  -     Comp Metabolic Panel (14); Future  -     Vitamin D; Future  -     TSH W Reflex To Free T4; Future  -     Vitamin B6; Future    Venous insufficiency  -     CK (Creatine Kinase) (Not Creatinine); Future  -     Uric Acid; Future  -     CBC With Differential With Platelet; Future  -     Comp Metabolic Panel (14); Future  -     Vitamin D; Future  -     TSH W Reflex To Free T4; Future  -     Vitamin B6; Future    Chronic musculoskeletal pain              Return in about 8 months (around 4/7/2025).      The above plan of care, diagnosis, orders, and follow-up were discussed with the patient. Questions related to this recommended plan of care were answered.    Thank you for referring this delightful patient to me. Please feel free to contact me with any questions.     This report was performed utilizing speech recognition software technology. Despite proofreading, speech recognition errors could escape detection. If a word or phrase is confusing or out of context, please do not hesitate to call for   clarification.       Kind regards      Rohit Her MD  [facility]

## 2024-08-05 DIAGNOSIS — K21.9 GASTROESOPHAGEAL REFLUX DISEASE, UNSPECIFIED WHETHER ESOPHAGITIS PRESENT: ICD-10-CM

## 2024-08-05 RX ORDER — ERGOCALCIFEROL 1.25 MG/1
50000 CAPSULE ORAL WEEKLY
Qty: 4 CAPSULE | Refills: 0 | OUTPATIENT
Start: 2024-08-05

## 2024-08-05 RX ORDER — PANTOPRAZOLE SODIUM 40 MG/1
40 TABLET, DELAYED RELEASE ORAL
Qty: 90 TABLET | Refills: 0 | Status: SHIPPED | OUTPATIENT
Start: 2024-08-05

## 2024-08-09 DIAGNOSIS — E66.01 OBESITY, MORBID, BMI 50 OR HIGHER (HCC): ICD-10-CM

## 2024-08-09 DIAGNOSIS — Z51.81 THERAPEUTIC DRUG MONITORING: ICD-10-CM

## 2024-08-13 NOTE — TELEPHONE ENCOUNTER
Requesting   Requested Prescriptions     Pending Prescriptions Disp Refills    ZEPBOUND 5 MG/0.5ML Subcutaneous Solution Auto-injector [Pharmacy Med Name: Zepbound 5 Mg/0.5ml Inj Lill] 2 mL 0     Sig: Inject 5 mg into the skin once a week.       LOV: 06/24/2024  RTC: in about 3 months  Filled: 06/24/2024 #2ml with 1 refills    Future Appointments   Date Time Provider Department Center   8/28/2024  8:40 AM Alexandra Austin APRN [facility] [address]   11/22/2024  1:20 PM Alexandra Austin APRN [facility] [address]   3/27/2025  9:40 AM Rohit Her MD [id number] [address]     Waiting for pt

## 2024-08-26 RX ORDER — TIRZEPATIDE 5 MG/.5ML
5 INJECTION, SOLUTION SUBCUTANEOUS WEEKLY
Qty: 2 ML | Refills: 0 | OUTPATIENT
Start: 2024-08-26

## 2024-08-28 ENCOUNTER — OFFICE VISIT (OUTPATIENT)
Dept: INTERNAL MEDICINE CLINIC | Facility: CLINIC | Age: 49
End: 2024-08-28
Payer: COMMERCIAL

## 2024-08-28 VITALS
RESPIRATION RATE: 16 BRPM | WEIGHT: 293 LBS | HEIGHT: 67.5 IN | HEART RATE: 84 BPM | BODY MASS INDEX: 45.45 KG/M2 | SYSTOLIC BLOOD PRESSURE: 120 MMHG | DIASTOLIC BLOOD PRESSURE: 70 MMHG

## 2024-08-28 DIAGNOSIS — E03.8 SUBCLINICAL HYPOTHYROIDISM: ICD-10-CM

## 2024-08-28 DIAGNOSIS — E78.2 MIXED HYPERLIPIDEMIA: ICD-10-CM

## 2024-08-28 DIAGNOSIS — E66.01 OBESITY, MORBID, BMI 50 OR HIGHER (HCC): ICD-10-CM

## 2024-08-28 DIAGNOSIS — G47.33 OSA ON CPAP: ICD-10-CM

## 2024-08-28 DIAGNOSIS — Z51.81 THERAPEUTIC DRUG MONITORING: Primary | ICD-10-CM

## 2024-08-28 DIAGNOSIS — I10 ESSENTIAL HYPERTENSION: ICD-10-CM

## 2024-08-28 PROCEDURE — 99214 OFFICE O/P EST MOD 30 MIN: CPT | Performed by: NURSE PRACTITIONER

## 2024-08-28 PROCEDURE — 3078F DIAST BP <80 MM HG: CPT | Performed by: NURSE PRACTITIONER

## 2024-08-28 PROCEDURE — 3008F BODY MASS INDEX DOCD: CPT | Performed by: NURSE PRACTITIONER

## 2024-08-28 PROCEDURE — 3074F SYST BP LT 130 MM HG: CPT | Performed by: NURSE PRACTITIONER

## 2024-08-28 RX ORDER — TIRZEPATIDE 7.5 MG/.5ML
7.5 INJECTION, SOLUTION SUBCUTANEOUS WEEKLY
Qty: 2 ML | Refills: 1 | Status: SHIPPED | OUTPATIENT
Start: 2024-08-28

## 2024-08-28 NOTE — PROGRESS NOTES
HISTORY OF PRESENT ILLNESS  Chief Complaint   Patient presents with    Weight Check     Same weight     Baylee Granados is a 48 year old female here for follow up with medical weight loss program for the treatment of overweight, obesity, or morbid obesity.     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 + tracking reports- written report (calories: 1,900-2,000, <120g carbs, 70 g of protein   Meals out per week on average: 3  Stress is manageable   Sleep: 7 hours/night, waking up feeling rested    Denies chest pain, shortness of breath, dizziness, blurred vision, headache, paresthesia, nausea/vomiting.     Breakfast Lunch Dinner Snacks Fluids   Reviewed              Wt Readings from Last 6 Encounters:   08/28/24 (!) 340 lb (154.2 kg)   08/01/24 (!) 340 lb (154.2 kg)   06/24/24 (!) 340 lb (154.2 kg)   02/15/24 (!) 329 lb (149.2 kg)   01/04/24 (!) 332 lb (150.6 kg)   12/27/23 (!) 329 lb (149.2 kg)          REVIEW OF SYSTEMS  GENERAL: feels well otherwise, denied any fevers chills or night sweats   LUNGS: denies shortness of breath  CARDIOVASCULAR: denies chest pain  GI: denies abdominal pain  MUSCULOSKELETAL: +back pain, +joint pains   PSYCH: denies change in behavior or mood, denies feeling sad or depressed    EXAM  /70   Pulse 84   Resp 16   Ht 5' 7.5\" (1.715 m)   Wt (!) 340 lb (154.2 kg)   BMI 52.47 kg/m²       GENERAL: well developed, well nourished, in no apparent distress, A/O x3  SKIN: no rashes, no suspicious lesions  HEENT: atraumatic,  normocephalic, OP-clear, PERRLA  NECK: supple, no adenopathy  LUNGS: CTA in all fields, breathing non labored  CARDIO: RRR without murmur  GI: +BS, NT/ND, no masses or HSM  EXTREMITIES: no cyanosis, no clubbing, no edema    Lab Results   Component Value Date     (H) 12/27/2023    BUN 13 12/27/2023    BUNCREA 16.7 07/29/2021    CREATSERUM 1.08 (H) 12/27/2023    ANIONGAP 5 12/27/2023    GFR 78 01/03/2018    GFRNAA 56 (L) 07/05/2022    GFRAA 65 07/05/2022    CA 9.2 12/27/2023    OSMOCALC 290 12/27/2023    ALKPHO 95 12/27/2023    AST 32 12/27/2023    ALT 51 12/27/2023    BILT 1.1 12/27/2023    TP 7.1 12/27/2023    ALB 3.6 12/27/2023    GLOBULIN 3.5 12/27/2023    AGRATIO 1.4 04/27/2013     12/27/2023    K 3.7 12/27/2023     12/27/2023    CO2 28.0 12/27/2023     Lab Results   Component Value Date     12/27/2023    A1C 6.0 (H) 12/27/2023     Lab Results   Component Value Date    CHOLEST 211 (H) 12/27/2023    TRIG 419 (H) 12/27/2023    HDL 41 12/27/2023     (H) 12/27/2023    VLDL 71 (H) 12/27/2023    TCHDLRATIO 5.56 (H) 01/03/2018    NONHDLC 170 (H) 12/27/2023     Lab Results   Component Value Date    B12 535 10/18/2022     Lab Results   Component Value Date    VITD 9.6 (L) 12/27/2023       Current Outpatient Medications on File Prior to Visit   Medication Sig Dispense Refill    PANTOPRAZOLE 40 MG Oral Tab EC Take 1 tablet (40 mg total) by mouth every morning before breakfast. 90 tablet 0    LOSARTAN 25 MG Oral Tab Take 1 tablet (25 mg total) by mouth daily. 90 tablet 0    Adapalene 0.3 % External Gel Pea size amount to the entire face starting every third night building skin up to nightly use      ALPRAZolam 0.25 MG Oral Tab TAKE ONE TABLET BY MOUTH UP TO THREE TIMES A DAY AS NEEDED FOR ANXIETY OR PANIC ATTACK      gabapentin 100 MG Oral Cap Take 1 capsule (100 mg total) by mouth. Can take up to 4 capsules daily      lurasidone 20 MG Oral Tab Take 1 tablet (20 mg total) by mouth in the  morning and 1 tablet (20 mg total) before bedtime.      NURTEC 75 MG Oral Tablet Dispersible DISSOLVE ONE TABLET BY MOUTH AS NEEDED AT THE ONSET OF MIGRAINE.  MAXIMUM DOSE IN 24 HOURS IS 1 TABLET (75MG). 8 tablet 2    escitalopram (LEXAPRO) 10 MG Oral Tab Take 1 tablet (10 mg total) by mouth every morning. (Patient taking differently: Take 0.5 tablets (5 mg total) by mouth every morning. 1/2 tablet in the AM) 30 tablet 0    Dapsone (ACZONE) 7.5 % External Gel Apply 1 Application topically daily.      topiramate 25 MG Oral Tab Take 3 tablets (75 mg total) by mouth 2 (two) times daily. 540 tablet 1    allopurinol 300 MG Oral Tab Take 1.5 tablets (450 mg total) by mouth daily. 135 tablet 2    colchicine 0.6 MG Oral Tab Take 1 tablet (0.6 mg total) by mouth daily. 90 tablet 1    ergocalciferol 1.25 MG (30407 UT) Oral Cap Take 1 capsule (50,000 Units total) by mouth once a week. 4 capsule 3    albuterol 108 (90 Base) MCG/ACT Inhalation Aero Soln Inhale 2 puffs into the lungs every 4 (four) hours as needed for Wheezing. 1 each 0    atorvastatin 20 MG Oral Tab Take 1 tablet (20 mg total) by mouth nightly. 90 tablet 2    aspirin 81 MG Oral Tab EC Take 1 tablet (81 mg total) by mouth daily.      Multiple Vitamin (MULTI-VITAMIN DAILY OR) Take 1 tablet by mouth daily.        Meloxicam 15 MG Oral Tab Take 1 tablet (15 mg total) by mouth in the morning. (Patient not taking: Reported on 8/1/2024) 30 tablet 2     No current facility-administered medications on file prior to visit.       ASSESSMENT/PLAN    ICD-10-CM    1. Therapeutic drug monitoring  Z51.81 Tirzepatide-Weight Management (ZEPBOUND) 7.5 MG/0.5ML Subcutaneous Solution Auto-injector      2. Obesity, morbid, BMI 50 or higher (HCC)  E66.01 Tirzepatide-Weight Management (ZEPBOUND) 7.5 MG/0.5ML Subcutaneous Solution Auto-injector      3. Essential hypertension  I10       4. Mixed hyperlipidemia  E78.2       5. SELINA on CPAP  G47.33       6. Subclinical hypothyroidism  E03.8            PLAN   Initial Weight Data and Goal Weight Loss:  Initial consult: #331 lbs on 3/31/2021  Weight Calculations  Initial Weight: 331 lbs  Initial Weight Date: 03/31/21  Today's Weight: 340 lbs  5% Goal: 16.55  10% Goal: 33.1  Total Weight Loss: -9 lbs  Total weight loss: Down 0 lbs total, Net loss +9 lbs  Will increase medications: zepbound 7.5mg weekly   Continue with medications: topamax (from neuro?)   --advised of side effects and adverse effects of this medication  Contradictions: phentermine- stopped due to side effects, diethylpropion, ozempic, contrave, metformin   Reviewed labs   Continue with vitamin d (high dose with PCP)   HTN  blood pressure is in office is stable - continue present management  HLD  uncontrolled, (high triglycerides)  on current medication regimen, continue to follow-up with PCP  DM type 2, reviewed last a1c 6.0% on 12/2023  SELINA, on CPAP nightly   Joint pain, recommended aqua therapy   Subclinical hypothyroid, not currently taking any medications, managed by PCP   Wrote out macros and encouraged to track food   Nutrition: Low carb diet, recommended to eat breakfast daily/ regular protein intake  Follow up with dietitian and psychologist as recommended.  Discussed the role of sleep and stress in weight management.  Counseled on comprehensive weight loss plan including attention to nutrition, exercise and behavior/stress management for success. See patient instruction below for more details.  Discussed strategies to overcome barriers to successful weight loss and weight maintenance  FITTE: ACSM recommendations (150-300 minutes/ week in active weight loss)   Weight Loss Consent to treat reviewed and signed.    Total time spent on chart review, pre-charting, obtaining history, counseling, and educating, reviewing labs was 30 minutes.       NOTE TO PATIENT: The 21st Century Cures Act makes clinical notes like these available to patients in the interest of transparency. Clinical notes  are medical documents used by physicians and care providers to communicate with each other. These documents include medical language and terminology, abbreviations, and treatment information that may sound technical and at times possibly unfamiliar. In addition, at times, the verbiage may appear blunt or direct. These documents are one tool providers use to communicate relevant information and clinical opinions of the care providers in a way that allows common understanding of the clinical context.     There are no Patient Instructions on file for this visit.    No follow-ups on file.    Patient verbalizes understanding.    Alexandra Austin, APRN

## 2024-08-28 NOTE — PATIENT INSTRUCTIONS
Next steps:  1.  Fill your prescribed medication and take as discussed and prescribed: zepbound 7.5mg weekly   2.  Schedule a personal nutrition consultation with one of our registered dieticians     Please try to work on the following dietary changes:  Daily protein recommendation to start:  grams  Daily carbohydrate: <180g  Daily calories: 2,100-2,200  1.  Drink water with meals and throughout the day, cut down on soda and/or juice if consumed. Consider flavored water options like Bubbly, Spindrift, Hint and Lois.  2.  Eat breakfast daily and focus on having protein with each meal, examples include: greek yogurt, cottage cheese, hard boiled egg, whole grain toast with peanut butter.   3.  Reduce refined carbohydrates and sugars which includes items such as sweets, as well as rice, pasta, and bread and make sure to choose whole grain options when having them with just 1 serving per meal about the size of your inner palm.  4.  Consume non starchy veggies daily working towards making them a good 50% of your daily food intake. Add them to lunch and dinner consistently.  5.  Start a daily probiotic: VSL#3 is recommended, (order on line at www.vsl3.com). Take 1 capsule daily with water for 30 days, then reduce to 1 every other day (this will reduce the cost). Capsules can be left out for 2 weeks, but then must be refrigerated.      Please download carolin My Fitness Pal, LoseIt! Or Net Diary to monitor daily dietary intake and you will be able to see if you are eating the right amount of calories or too much or too little which would hinder weight loss. Additionally this will help to see your daily carbohydrate and protein intake. When you set the carolin up choose 1-2 lbs/week as a goal.  Keeping a paper food journal is an option as well to remain accountable for your choices- this is the start to mindful eating! A low calorie diet has been consistently shown to support weight loss.     Continue or start exercising to  help establish a routine. If not already exercising begin with 1 day and progress as able with long-term goal of 30 minutes 5 days a week at a minimum.     Meditation daily can help manage and control stress. Chronic stress can make weight loss difficult.  Exercising is one way to help with stress, but meditation using the CALM Eva or another comparable alternative can be done in your home or place of work with little time commitment. This Eva can also help work on behavior change and improve sleep. Check out the segment under Calm Masterclass and listen to The 4 Pillars of Health. A great way to begin learning about the foundation of lifestyle with practical tips to use in your every day.     Check out www.yourweightmatters.org blog for continued daily support and education along this weight loss journey!    Patient Resources:     Personal Training/Fitness Classes/Health Coaching     [facility] @ [email or web address] Full fitness center with group fitness and personal training. Discount available as client of [facility].  Health Coaching and Personal Training with Becky Solitario at our [facility]- individual weekly coaching with option to add personal training and small group fitness classes targeted at weight loss- [phone] and/or email @ [email or web address]  [facility]FIT [place] [email or web address]. Group Fitness [phone] and/or email Brynn at [email or web address]  Franc\A [facility] [place] [facility]\""ed Fitness Centers with multiple locations: Synosia Therapeutics (www.Pricelock), Eat The [address] [address] Fitness (www.Stream Alliance International Holding.Ayondo), Fit Body Bootcamp (www.Imagine CommunicationsbodyboTonawanda Self Storagep.Ayondo), Energy Harvesters LLC (www."SNAP Interactive, Inc.".Ayondo), The Exercise  (www.exercisecoach.Ayondo)     Online Fitness  Fitness  on Utube  Fit in 10 DVD series- www.enqsm86DRI.com  Sit and Be Fit - Chair exercise series Www.sitandbefit.org  Hip  Hop Fit with Pancho Baltazar at www.hiphopfit.net     Apps for on the Go Fitness  [place] 7 Minute Workout (orange box with white 7) - free on the go HIIT training eva  Peloton Eva @ www.onepeloton.com     Nutrition Trackers and Tools  LoseIT! And My Fitness Pal apps and on line for tracking nutrition  NOOM - virtual health coaching  FitFoundation (healthy meals on the go) in [place] @ www.eejcxkxhdghmz5b.The Green Way  Bistro MD @ MRO.bistromd.com and Heamin57 (keto and low carb plans recommended) @ www.rpuyrm79.com, Metabolic Meals @ www.MyMetabolicMeals.com - individual prepared meals to go  Gobble, Blue Apron, Home , Every Plate, Sunbasket- on line meal delivery programs for preparation at home  Meal Village in [place] for homemade meals to go @ www.mealvillage.The [address] Way  Diet Doctor @ www.dietdoctor.The [place] [address] - low carb swaps  YuScoreloop - meal prep and planning eva (www.yummly.com)     Stress Management/Behavior/Mindful Eating  CALM meditation eva (www.calm.com)  Headspace  Am I Hungry? Mindful eating virtual  eva  Www.yourweightmatters.org - Obesity Action Coalition sponsored Blog posts daily  Motivation eva (black box with white \")- daily supportive messages sent to your phone     Books/Video Education/Podcasts  Mindless Eating by Michael Frye  Why We Get Sick by Mohsen Higginbotham (a book about insulin resistance)  Atomic Habits by Oz Faulkner (a book about taking small steps to promote greater behavior change)   Can't Hurt Me by Tj Lala (a book exploring the power of discipline in achieving your goals)  The End of Dieting: How to Live for Life by Dr. Vladimir Olsen M.D. or listen to The KiteReaders Academy Podcast Episode 63: Understanding \"Nutritarian\" Eating w/Dr. Vladimir Olsen  Your Body in Balance: The New Science of Food, Hormones, and Health by Dr. Kalin Vásquez  The Menopause Diet Plan by Jasmin Umana and Sara Fuentes  The Complete Guide to fasting by Dr. Duron  Sugar, Salt & Fat by Katty Herrera, Ph.D, R.D.  Weight  Loss Surgery Will Not Treat Food Addiction by Olga Wheeler Ph.D  The Game Changers- Medxnoteix Documentary on plant based nutrition  Fed Up - documentary about obesity (Free on Utube)  The Truth About Sugar - documentary on sugar (Free on Utube, https://youBIO Wellnessu.be/8B8cpxjEB7z)  The Dr. Duque T5 Wellness Plan by Dr. Javier Duque MD  Fitlosophy Fitspiration - journal to better health (found at [employer] in fitness aisle)  What Happened to You?- a look at the impact trauma has on behavior written by Lui Membreno and Dr. Gustavo Marin  Whole Again by Larry Su - discovering your true self after trauma  Santiago Do talk on "Beckon[employer] Inc.", The Call to Courage  Podcasts: The Exam Room by the Physician's Committee, Nutrition Facts by Dr. Dwyer    We are here to support you with weight loss, but please remember that you still need your primary care provider for your routine health maintenance.

## 2024-11-03 DIAGNOSIS — Z51.81 THERAPEUTIC DRUG MONITORING: ICD-10-CM

## 2024-11-03 DIAGNOSIS — E66.01 OBESITY, MORBID, BMI 50 OR HIGHER (HCC): ICD-10-CM

## 2024-11-04 NOTE — TELEPHONE ENCOUNTER
Requesting   Requested Prescriptions     Pending Prescriptions Disp Refills    ZEPBOUND 7.5 MG/0.5ML Subcutaneous Solution Auto-injector [Pharmacy Med Name: Zepbound 7.5 Mg/0.5ml Inj Lill] 2 mL 0     Sig: Inject 7.5 mg into the skin once a week.       LOV: 08/28/2024  RTC: in about 4 months  Filled: 08/28/2024 #2ml with 1 refills    Future Appointments   Date Time Provider Department Center   11/22/2024  1:20 PM Alexandra Austin APRN [facility] [address]   3/24/2025  9:20 AM Alexandra Austin APRN [facility]   3/27/2025  9:40 AM Rohit Her MD [facility] [address]   6/9/2025  8:40 AM Alexandra Austin APRN [facility]     waiting

## 2024-11-22 ENCOUNTER — TELEMEDICINE (OUTPATIENT)
Dept: INTERNAL MEDICINE CLINIC | Facility: CLINIC | Age: 49
End: 2024-11-22
Payer: COMMERCIAL

## 2024-11-22 DIAGNOSIS — E66.01 OBESITY, MORBID, BMI 50 OR HIGHER (HCC): ICD-10-CM

## 2024-11-22 DIAGNOSIS — G47.33 OSA ON CPAP: ICD-10-CM

## 2024-11-22 DIAGNOSIS — E78.2 MIXED HYPERLIPIDEMIA: ICD-10-CM

## 2024-11-22 DIAGNOSIS — I10 ESSENTIAL HYPERTENSION: ICD-10-CM

## 2024-11-22 DIAGNOSIS — E03.8 SUBCLINICAL HYPOTHYROIDISM: ICD-10-CM

## 2024-11-22 DIAGNOSIS — Z51.81 THERAPEUTIC DRUG MONITORING: Primary | ICD-10-CM

## 2024-11-22 NOTE — PROGRESS NOTES
[facility] WEIGHT MANAGEMENT VIRTUAL ENCOUNTER     Baylee Granados verbally consents to a Virtual/Telephone Check-In service on 11/22/24   Patient understands and accepts financial responsibility for any deductible, co-insurance and/or co-pays associated with this service.    HISTORY OF PRESENT ILLNESS  Chief Complaint   Patient presents with    Other     F/u on weight management      Baylee Granados is a 48 year old female is being evaluated as a video visit using Telemedicine with live, interactive video and audio    Weight gain/loss since LOV based on home monitoring:   Home scale:  333  Has lost  #7 lbs since LOV 2.5 months ago     Compliance with medication: zepbound 7.5mg weekly (has been without meds for the past 3 weeks due to pharmacy shortages)   Tolerating well, doesn't feel like it's helping with decreasing appetite and no side effects     Trying to do more vegs (instead of chips) for snacks  Exercise/Activity: 3x/ week, via walking and stationary bike, melvin   Nutrition: eating regular meals, +protein, minimal veggies. + tracking reports- written report  Stress is manageable   Sleep: 7 hours/night, waking up feeling rested    Denies chest pain, shortness of breath, dizziness, blurred vision, headache, paresthesia, nausea/vomiting.     Wt Readings from Last 6 Encounters:   08/28/24 (!) 340 lb (154.2 kg)   08/01/24 (!) 340 lb (154.2 kg)   06/24/24 (!) 340 lb (154.2 kg)   02/15/24 (!) 329 lb (149.2 kg)   01/04/24 (!) 332 lb (150.6 kg)   12/27/23 (!) 329 lb (149.2 kg)          Subjective  REVIEW OF SYSTEMS  GENERAL HEALTH: feels well otherwise, denied any fevers chills or night sweats   RESPIRATORY: denies shortness of breath   CARDIOVASCULAR: denies chest pain  GI: denies abdominal pain  PSYCH: denies any mood changes    Objective  EXAM  Reviewed most recent set of vitals   Physical Exam:  GENERAL: well developed, well nourished, in no apparent distress, speaking in full sentences  comfortably   SKIN: warm, pink, dry without rashes to exposed area   EYES: conjunctiva pink  HEENT: atraumatic, normocephalic  LUNGS: normal work of breathing, non labored  CARDIO: normal work, no exertion  EXTREMITIES: no cyanosis, no clubbing, no edema  NEURO: Oriented times three  PSYCH: pleasant, cooperative, normal mood and affect    Lab Results   Component Value Date    WBC 5.7 12/27/2023    RBC 4.88 12/27/2023    HGB 13.1 12/27/2023    HCT 41.3 12/27/2023    MCV 84.6 12/27/2023    MCH 26.8 12/27/2023    MCHC 31.7 12/27/2023    RDW 13.2 12/27/2023    .0 12/27/2023     Lab Results   Component Value Date     (H) 12/27/2023    BUN 13 12/27/2023    BUNCREA 16.7 07/29/2021    CREATSERUM 1.08 (H) 12/27/2023    ANIONGAP 5 12/27/2023    GFR 78 01/03/2018    GFRNAA 56 (L) 07/05/2022    GFRAA 65 07/05/2022    CA 9.2 12/27/2023    OSMOCALC 290 12/27/2023    ALKPHO 95 12/27/2023    AST 32 12/27/2023    ALT 51 12/27/2023    BILT 1.1 12/27/2023    TP 7.1 12/27/2023    ALB 3.6 12/27/2023    GLOBULIN 3.5 12/27/2023    AGRATIO 1.4 04/27/2013     12/27/2023    K 3.7 12/27/2023     12/27/2023    CO2 28.0 12/27/2023     Lab Results   Component Value Date     12/27/2023    A1C 6.0 (H) 12/27/2023     Lab Results   Component Value Date    CHOLEST 211 (H) 12/27/2023    TRIG 419 (H) 12/27/2023    HDL 41 12/27/2023     (H) 12/27/2023    VLDL 71 (H) 12/27/2023    TCHDLRATIO 5.56 (H) 01/03/2018    NONHDLC 170 (H) 12/27/2023     Lab Results   Component Value Date    T4F 0.9 07/05/2022    TSH 1.960 12/27/2023    TSHT4 3.09 03/17/2011     Lab Results   Component Value Date    B12 535 10/18/2022     Lab Results   Component Value Date    VITD 9.6 (L) 12/27/2023       Medications Ordered Prior to Encounter[1]    ASSESSMENT  Analyzed weight data:       Diagnoses and all orders for this visit:    Therapeutic drug monitoring    Obesity, morbid, BMI 50 or higher (HCC)    Essential hypertension    Subclinical  hypothyroidism    SELINA on CPAP    Mixed hyperlipidemia        PLAN  Continue with medications: zepbound 7.5mg weekly   Continue with medications: topamax (from neuro?)   --advised of side effects and adverse effects of this medication  Contradictions: phentermine- stopped due to side effects, diethylpropion, ozempic, contrave, metformin   Reviewed labs   Continue with vitamin d (high dose with PCP)   HTN  blood pressure is in office is stable - continue present management  HLD  uncontrolled, (high triglycerides)  on current medication regimen, continue to follow-up with PCP  DM type 2, reviewed last a1c 6.0% on 12/2023  SELINA, on CPAP nightly   Joint pain, recommended aqua therapy   Subclinical hypothyroid, not currently taking any medications, managed by PCP   Wrote out macros and encouraged to track food   Advised to monitor blood pressure and pulse at home/ given parameters to review and contact provider.  Nutrition: low carb diet/ recommended to eat breakfast daily/ regular protein intake  Follow up with dietitian and psychologist as recommended.  Discussed the role of sleep and stress in weight management.  Counseled on comprehensive weight loss plan including attention to nutrition, exercise and behavior/stress management for success. See patient instruction below for more details.  Discussed strategies to overcome barriers to successful weight loss and weight maintenance  FITTE: ACSM recommendations (150-300 minutes/ week in active weight loss)       There are no Patient Instructions on file for this visit.    No follow-ups on file.    Patient verbalizes understanding.    Total time spent on chart review, pre-charting, obtaining history, counseling, and educating, reviewing labs was 23 minutes.       Pt understands phone/video evaluation is not a substitute for face to face examination or emergency care. Pt advised to go to the ER or call 911 for worsening symptoms or acute distress.       Please note that the  following visit was completed using two-way, real-time interactive audio and/or video communication.  This has been done in good jason to provide continuity of care in the best interest of the provider-patient relationship, due to the ongoing public health crisis/national emergency and because of restrictions of visitation.  There are limitations of this visit as no physical exam could be performed.  Every conscious effort was taken to allow for sufficient and adequate time.  This billing was spent on reviewing labs, medications, radiology tests and decision making.  Appropriate medical decision-making and tests are ordered as detailed in the plan of care above.     NOTE TO PATIENT: The 21st Century Cures Act makes clinical notes like these available to patients in the interest of transparency. Clinical notes are medical documents used by physicians and care providers to communicate with each other. These documents include medical language and terminology, abbreviations, and treatment information that may sound technical and at times possibly unfamiliar. In addition, at times, the verbiage may appear blunt or direct. These documents are one tool providers use to communicate relevant information and clinical opinions of the care providers in a way that allows common understanding of the clinical context.     Alexandra Austin, APRN  11/22/2024         [1]   Current Outpatient Medications on File Prior to Visit   Medication Sig Dispense Refill    Tirzepatide-Weight Management (ZEPBOUND) 7.5 MG/0.5ML Subcutaneous Solution Auto-injector Inject 7.5 mg into the skin once a week. 2 mL 1    PANTOPRAZOLE 40 MG Oral Tab EC Take 1 tablet (40 mg total) by mouth every morning before breakfast. 90 tablet 0    LOSARTAN 25 MG Oral Tab Take 1 tablet (25 mg total) by mouth daily. 90 tablet 0    Adapalene 0.3 % External Gel Pea size amount to the entire face starting every third night building skin up to nightly use      ALPRAZolam  0.25 MG Oral Tab TAKE ONE TABLET BY MOUTH UP TO THREE TIMES A DAY AS NEEDED FOR ANXIETY OR PANIC ATTACK      gabapentin 100 MG Oral Cap Take 1 capsule (100 mg total) by mouth. Can take up to 4 capsules daily      lurasidone 20 MG Oral Tab Take 1 tablet (20 mg total) by mouth in the morning and 1 tablet (20 mg total) before bedtime.      NURTEC 75 MG Oral Tablet Dispersible DISSOLVE ONE TABLET BY MOUTH AS NEEDED AT THE ONSET OF MIGRAINE.  MAXIMUM DOSE IN 24 HOURS IS 1 TABLET (75MG). 8 tablet 2    escitalopram (LEXAPRO) 10 MG Oral Tab Take 1 tablet (10 mg total) by mouth every morning. (Patient taking differently: Take 0.5 tablets (5 mg total) by mouth every morning. 1/2 tablet in the AM) 30 tablet 0    Dapsone (ACZONE) 7.5 % External Gel Apply 1 Application topically daily.      topiramate 25 MG Oral Tab Take 3 tablets (75 mg total) by mouth 2 (two) times daily. 540 tablet 1    colchicine 0.6 MG Oral Tab Take 1 tablet (0.6 mg total) by mouth daily. 90 tablet 1    ergocalciferol 1.25 MG (24074 UT) Oral Cap Take 1 capsule (50,000 Units total) by mouth once a week. 4 capsule 3    albuterol 108 (90 Base) MCG/ACT Inhalation Aero Soln Inhale 2 puffs into the lungs every 4 (four) hours as needed for Wheezing. 1 each 0    Meloxicam 15 MG Oral Tab Take 1 tablet (15 mg total) by mouth in the morning. (Patient not taking: Reported on 8/1/2024) 30 tablet 2    atorvastatin 20 MG Oral Tab Take 1 tablet (20 mg total) by mouth nightly. 90 tablet 2    aspirin 81 MG Oral Tab EC Take 1 tablet (81 mg total) by mouth daily.      Multiple Vitamin (MULTI-VITAMIN DAILY OR) Take 1 tablet by mouth daily.         No current facility-administered medications on file prior to visit.

## 2024-11-22 NOTE — PATIENT INSTRUCTIONS
Next steps:  1.  Fill your prescribed medication and take as discussed and prescribed: zepbound   2.  Schedule a personal nutrition consultation with one of our registered dieticians     Please try to work on the following dietary changes:    1.  Drink water with meals and throughout the day, cut down on soda and/or juice if consumed. Consider flavored water options like Bubbly, Spindrift, Hint and Lois.  2.  Eat breakfast daily and focus on having protein with each meal, examples include: greek yogurt, cottage cheese, hard boiled egg, whole grain toast with peanut butter.   3.  Reduce refined carbohydrates and sugars which includes items such as sweets, as well as rice, pasta, and bread and make sure to choose whole grain options when having them with just 1 serving per meal about the size of your inner palm.  4.  Consume non starchy veggies daily working towards making them a good 50% of your daily food intake. Add them to lunch and dinner consistently.  5.  Start a daily probiotic: VSL#3 is recommended, (order on line at www.vsl3.com). Take 1 capsule daily with water for 30 days, then reduce to 1 every other day (this will reduce the cost). Capsules can be left out for 2 weeks, but then must be refrigerated.      Please download carolin My Fitness Pal, LoseIt! Or Net Diary to monitor daily dietary intake and you will be able to see if you are eating the right amount of calories or too much or too little which would hinder weight loss. Additionally this will help to see your daily carbohydrate and protein intake. When you set the carolin up choose 1-2 lbs/week as a goal.  Keeping a paper food journal is an option as well to remain accountable for your choices- this is the start to mindful eating! A low calorie diet has been consistently shown to support weight loss.     Continue or start exercising to help establish a routine. If not already exercising begin with 1 day and progress as able with long-term goal of 30  minutes 5 days a week at a minimum.     Meditation daily can help manage and control stress. Chronic stress can make weight loss difficult.  Exercising is one way to help with stress, but meditation using the CALM Eva or another comparable alternative can be done in your home or place of work with little time commitment. This Eva can also help work on behavior change and improve sleep. Check out the segment under Calm Masterclass and listen to The 4 Pillars of Health. A great way to begin learning about the foundation of lifestyle with practical tips to use in your every day.     Check out www.yourweightmatters.org blog for continued daily support and education along this weight loss journey!    Patient Resources:     Personal Training/Fitness Classes/Health Coaching     [facility] @ [email or web address]/healthy-driven/[email or web address]-center Full fitness center with group fitness and personal training. Discount available as client of [facility].  Health Coaching and Personal Training with Becky Solitario at our [facility]- individual weekly coaching with option to add personal training and small group fitness classes targeted at weight loss- [phone] and/or email [email or web address]  360FIT [place] http://www.Southern Dreams. Group Fitness [phone] and/or email Brynn at [email or web address]  FrancNav[facility] Hospitaled Fitness Centers with multiple locations: ProspectNow (www.Dizkon), Eat The Loan Servicing Solutions Fitness (www.Maeglin Software.EZ2CAD), Fit Body Bootcamp (www.Robotokip.EZ2CAD), First Marketing (www.ReserveOut), The Exercise  (www.exercisecoach.EZ2CAD)     Online Fitness  Fitness  on Utube  Fit in 10 DVD series- www.utukc80HCS.com  Sit and Be Fit - Chair exercise series Www.sitandbefit.org  Hip Hop Fit with Pancho Baltazar at www.hiphopfit.net     Apps for on the Go Fitness  [place] 7 Minute Workout (orange box  with white 7) - free on the go HIIT training eva  Peloton Eva @ www.onepeloton.com     Nutrition Trackers and Tools  LoseIT! And My Fitness Pal apps and on line for tracking nutrition  NOOM - virtual health coaching  FitFoundation (healthy meals on the go) in [place] @ www.uutwqxiomcgtv3r.Share Some Style  Ida MD @ www.bistromd.com and Yddtyo25 (keto and low carb plans recommended) @ www.klutxp33.com, Metabolic Meals @ www.MyMetabolicMeals.com - individual prepared meals to go  Gobble, Blue Apron, Home , Every Plate, Sunbasket- on line meal delivery programs for preparation at home  Meal Village in [place] for homemade meals to go @ www.mealAudibasellage.Share Some Style  Diet Doctor @ www.dietdoctor.Share Some Style - low carb swaps  Yummly - meal prep and planning eva (www.yummly.com)     Stress Management/Behavior/Mindful Eating  CALM meditation eva (www.calm.com)  Headspace  Am I Hungry? Mindful eating virtual  eva  Www.yourweightmatters.org - Obesity Action Coalition sponsored Blog posts daily  Motivation eva (black box with white \")- daily supportive messages sent to your phone     Books/Video Education/Podcasts  Mindless Eating by Michael Frye  Why We Get Sick by Mohsen Higginbotham (a book about insulin resistance)  Atomic Habits by Oz Faulkner (a book about taking small steps to promote greater behavior change)   Can't Hurt Me by Tj Lala (a book exploring the power of discipline in achieving your goals)  The End of Dieting: How to Live for Life by Dr. Vladimir Olsen M.D. or listen to The WeGame Podcast Episode 63: Understanding \"Nutritarian\" Eating w/Dr. Vladimir Olsen  Your Body in Balance: The New Science of Food, Hormones, and Health by Dr. Kalin Vásquez  The Menopause Diet Plan by Jasmin Umana and Sara Fuentes  The Complete Guide to fasting by Dr. Duron  Sugar, Salt & Fat by Katty Herrera, Ph.D, R.D.  Weight Loss Surgery Will Not Treat Food Addiction by Olga Wheeler Ph.D  The Game Changers- Netflix Documentary on  plant based nutrition  Fed Up - documentary about obesity (Free on Utube)  The Truth About Sugar - documentary on sugar (Free on Utube, https://youtu.be/9O5mppnCC9w)  The Dr. Duque T5 Wellness Plan by Dr. Javier Duque MD  Fitlosophy Fitspiration - journal to better health (found at [employer] in fitness aisle)  What Happened to You?- a look at the impact trauma has on behavior written by Lui Membreno and Dr. Gustavo Marin  Whole Again by Larry Su - discovering your true self after trauma  Santiago Do talk on shoutr, The Call to NetDragon  Podcasts: The Exam Room by the Physician's Committee, Nutrition Facts by Dr. Dwyer    We are here to support you with weight loss, but please remember that you still need your primary care provider for your routine health maintenance.

## 2024-11-25 ENCOUNTER — PATIENT MESSAGE (OUTPATIENT)
Dept: INTERNAL MEDICINE CLINIC | Facility: CLINIC | Age: 49
End: 2024-11-25

## 2024-11-25 DIAGNOSIS — E66.01 OBESITY, MORBID, BMI 50 OR HIGHER (HCC): ICD-10-CM

## 2024-11-25 DIAGNOSIS — Z51.81 THERAPEUTIC DRUG MONITORING: ICD-10-CM

## 2024-11-25 RX ORDER — TIRZEPATIDE 7.5 MG/.5ML
7.5 INJECTION, SOLUTION SUBCUTANEOUS WEEKLY
Qty: 2 ML | Refills: 1 | Status: SHIPPED | OUTPATIENT
Start: 2024-11-25

## 2024-11-27 ENCOUNTER — TELEPHONE (OUTPATIENT)
Dept: INTERNAL MEDICINE CLINIC | Facility: CLINIC | Age: 49
End: 2024-11-27

## 2024-11-27 NOTE — TELEPHONE ENCOUNTER
JOSE ALBERTOM and sent [employer] message regarding the cancellation of appointment for 06/09/25 with Alexandra Austin and asked to call the office to reschedule or do it through [employer].

## 2024-11-29 DIAGNOSIS — K21.9 GASTROESOPHAGEAL REFLUX DISEASE, UNSPECIFIED WHETHER ESOPHAGITIS PRESENT: ICD-10-CM

## 2024-11-29 RX ORDER — PANTOPRAZOLE SODIUM 40 MG/1
40 TABLET, DELAYED RELEASE ORAL
Qty: 90 TABLET | Refills: 0 | Status: SHIPPED | OUTPATIENT
Start: 2024-11-29

## 2024-12-16 RX ORDER — TIRZEPATIDE 7.5 MG/.5ML
7.5 INJECTION, SOLUTION SUBCUTANEOUS WEEKLY
Qty: 2 ML | Refills: 0 | OUTPATIENT
Start: 2024-12-16

## 2024-12-22 DIAGNOSIS — I10 ESSENTIAL HYPERTENSION: ICD-10-CM

## 2024-12-22 DIAGNOSIS — E11.9 TYPE 2 DIABETES MELLITUS WITHOUT COMPLICATION, WITHOUT LONG-TERM CURRENT USE OF INSULIN (HCC): ICD-10-CM

## 2024-12-23 RX ORDER — LISINOPRIL 10 MG/1
10 TABLET ORAL DAILY
Qty: 30 TABLET | Refills: 0 | OUTPATIENT
Start: 2024-12-23

## 2024-12-24 PROBLEM — J96.01 ACUTE RESPIRATORY FAILURE WITH HYPOXIA (HCC): Status: RESOLVED | Noted: 2024-12-24 | Resolved: 2024-12-24

## 2024-12-24 PROBLEM — J96.01 ACUTE RESPIRATORY FAILURE WITH HYPOXIA (HCC): Status: ACTIVE | Noted: 2024-12-24

## 2024-12-30 ENCOUNTER — TELEPHONE (OUTPATIENT)
Dept: FAMILY MEDICINE CLINIC | Facility: CLINIC | Age: 49
End: 2024-12-30

## 2024-12-30 ENCOUNTER — OFFICE VISIT (OUTPATIENT)
Dept: FAMILY MEDICINE CLINIC | Facility: CLINIC | Age: 49
End: 2024-12-30
Payer: COMMERCIAL

## 2024-12-30 ENCOUNTER — LAB ENCOUNTER (OUTPATIENT)
Dept: LAB | Age: 49
End: 2024-12-30
Attending: FAMILY MEDICINE
Payer: COMMERCIAL

## 2024-12-30 VITALS
HEIGHT: 67.5 IN | DIASTOLIC BLOOD PRESSURE: 78 MMHG | OXYGEN SATURATION: 98 % | HEART RATE: 86 BPM | WEIGHT: 293 LBS | SYSTOLIC BLOOD PRESSURE: 126 MMHG | BODY MASS INDEX: 45.45 KG/M2

## 2024-12-30 DIAGNOSIS — I87.2 VENOUS INSUFFICIENCY: ICD-10-CM

## 2024-12-30 DIAGNOSIS — G62.9 NEUROPATHY: ICD-10-CM

## 2024-12-30 DIAGNOSIS — Z51.81 THERAPEUTIC DRUG MONITORING: ICD-10-CM

## 2024-12-30 DIAGNOSIS — E78.2 MIXED HYPERLIPIDEMIA: ICD-10-CM

## 2024-12-30 DIAGNOSIS — R53.83 OTHER FATIGUE: ICD-10-CM

## 2024-12-30 DIAGNOSIS — Z00.00 ROUTINE GENERAL MEDICAL EXAMINATION AT A HEALTH CARE FACILITY: Primary | ICD-10-CM

## 2024-12-30 DIAGNOSIS — Z78.0 MENOPAUSE: ICD-10-CM

## 2024-12-30 DIAGNOSIS — G89.29 CHRONIC MUSCULOSKELETAL PAIN: ICD-10-CM

## 2024-12-30 DIAGNOSIS — E11.42 DIABETIC POLYNEUROPATHY ASSOCIATED WITH TYPE 2 DIABETES MELLITUS (HCC): ICD-10-CM

## 2024-12-30 DIAGNOSIS — Z13.228 SCREENING FOR ENDOCRINE, NUTRITIONAL, METABOLIC AND IMMUNITY DISORDER: ICD-10-CM

## 2024-12-30 DIAGNOSIS — Z13.29 SCREENING FOR ENDOCRINE, NUTRITIONAL, METABOLIC AND IMMUNITY DISORDER: ICD-10-CM

## 2024-12-30 DIAGNOSIS — Z13.0 SCREENING FOR ENDOCRINE, NUTRITIONAL, METABOLIC AND IMMUNITY DISORDER: ICD-10-CM

## 2024-12-30 DIAGNOSIS — Z12.31 VISIT FOR SCREENING MAMMOGRAM: ICD-10-CM

## 2024-12-30 DIAGNOSIS — M1A.09X0 IDIOPATHIC CHRONIC GOUT OF MULTIPLE SITES WITHOUT TOPHUS: ICD-10-CM

## 2024-12-30 DIAGNOSIS — Z13.21 SCREENING FOR ENDOCRINE, NUTRITIONAL, METABOLIC AND IMMUNITY DISORDER: ICD-10-CM

## 2024-12-30 DIAGNOSIS — M79.18 CHRONIC MUSCULOSKELETAL PAIN: ICD-10-CM

## 2024-12-30 LAB
ALBUMIN SERPL-MCNC: 4.3 G/DL (ref 3.2–4.8)
ALBUMIN/GLOB SERPL: 1.5 {RATIO} (ref 1–2)
ALP LIVER SERPL-CCNC: 92 U/L
ALT SERPL-CCNC: 48 U/L
ANION GAP SERPL CALC-SCNC: 6 MMOL/L (ref 0–18)
AST SERPL-CCNC: 35 U/L (ref ?–34)
BASOPHILS # BLD AUTO: 0.06 X10(3) UL (ref 0–0.2)
BASOPHILS NFR BLD AUTO: 1.2 %
BILIRUB SERPL-MCNC: 0.6 MG/DL (ref 0.3–1.2)
BUN BLD-MCNC: 13 MG/DL (ref 9–23)
CALCIUM BLD-MCNC: 9.7 MG/DL (ref 8.7–10.4)
CHLORIDE SERPL-SCNC: 108 MMOL/L (ref 98–112)
CHOLEST SERPL-MCNC: 212 MG/DL (ref ?–200)
CK SERPL-CCNC: 103 U/L
CO2 SERPL-SCNC: 26 MMOL/L (ref 21–32)
CREAT BLD-MCNC: 0.91 MG/DL
DEPRECATED HBV CORE AB SER IA-ACNC: 190 NG/ML
EGFRCR SERPLBLD CKD-EPI 2021: 76 ML/MIN/1.73M2 (ref 60–?)
EOSINOPHIL # BLD AUTO: 0.22 X10(3) UL (ref 0–0.7)
EOSINOPHIL NFR BLD AUTO: 4.6 %
ERYTHROCYTE [DISTWIDTH] IN BLOOD BY AUTOMATED COUNT: 13.4 %
FASTING PATIENT LIPID ANSWER: YES
FASTING STATUS PATIENT QL REPORTED: YES
FSH SERPL-ACNC: 6.3 MIU/ML
GLOBULIN PLAS-MCNC: 2.9 G/DL (ref 2–3.5)
GLUCOSE BLD-MCNC: 116 MG/DL (ref 70–99)
HCT VFR BLD AUTO: 43.6 %
HDLC SERPL-MCNC: 40 MG/DL (ref 40–59)
HGB BLD-MCNC: 14 G/DL
IMM GRANULOCYTES # BLD AUTO: 0.01 X10(3) UL (ref 0–1)
IMM GRANULOCYTES NFR BLD: 0.2 %
IRON SATN MFR SERPL: 20 %
IRON SERPL-MCNC: 61 UG/DL
LDLC SERPL CALC-MCNC: 124 MG/DL (ref ?–100)
LH SERPL-ACNC: 3 MIU/ML
LYMPHOCYTES # BLD AUTO: 1.86 X10(3) UL (ref 1–4)
LYMPHOCYTES NFR BLD AUTO: 38.6 %
MCH RBC QN AUTO: 27.5 PG (ref 26–34)
MCHC RBC AUTO-ENTMCNC: 32.1 G/DL (ref 31–37)
MCV RBC AUTO: 85.7 FL
MONOCYTES # BLD AUTO: 0.36 X10(3) UL (ref 0.1–1)
MONOCYTES NFR BLD AUTO: 7.5 %
NEUTROPHILS # BLD AUTO: 2.31 X10 (3) UL (ref 1.5–7.7)
NEUTROPHILS # BLD AUTO: 2.31 X10(3) UL (ref 1.5–7.7)
NEUTROPHILS NFR BLD AUTO: 47.9 %
NONHDLC SERPL-MCNC: 172 MG/DL (ref ?–130)
OSMOLALITY SERPL CALC.SUM OF ELEC: 291 MOSM/KG (ref 275–295)
PLATELET # BLD AUTO: 193 10(3)UL (ref 150–450)
POTASSIUM SERPL-SCNC: 3.9 MMOL/L (ref 3.5–5.1)
PROT SERPL-MCNC: 7.2 G/DL (ref 5.7–8.2)
RBC # BLD AUTO: 5.09 X10(6)UL
SODIUM SERPL-SCNC: 140 MMOL/L (ref 136–145)
TOTAL IRON BINDING CAPACITY: 311 UG/DL (ref 250–425)
TRANSFERRIN SERPL-MCNC: 240 MG/DL (ref 250–380)
TRIGL SERPL-MCNC: 273 MG/DL (ref 30–149)
TSI SER-ACNC: 4.77 UIU/ML (ref 0.55–4.78)
URATE SERPL-MCNC: 6.1 MG/DL
VIT B12 SERPL-MCNC: 432 PG/ML (ref 211–911)
VIT D+METAB SERPL-MCNC: 11.1 NG/ML (ref 30–100)
VLDLC SERPL CALC-MCNC: 49 MG/DL (ref 0–30)
WBC # BLD AUTO: 4.8 X10(3) UL (ref 4–11)

## 2024-12-30 PROCEDURE — 82671 ASSAY OF ESTROGENS: CPT | Performed by: FAMILY MEDICINE

## 2024-12-30 PROCEDURE — 83001 ASSAY OF GONADOTROPIN (FSH): CPT | Performed by: FAMILY MEDICINE

## 2024-12-30 PROCEDURE — 82306 VITAMIN D 25 HYDROXY: CPT | Performed by: INTERNAL MEDICINE

## 2024-12-30 PROCEDURE — 83540 ASSAY OF IRON: CPT | Performed by: FAMILY MEDICINE

## 2024-12-30 PROCEDURE — 83550 IRON BINDING TEST: CPT | Performed by: FAMILY MEDICINE

## 2024-12-30 PROCEDURE — 82607 VITAMIN B-12: CPT | Performed by: FAMILY MEDICINE

## 2024-12-30 PROCEDURE — 84207 ASSAY OF VITAMIN B-6: CPT | Performed by: INTERNAL MEDICINE

## 2024-12-30 PROCEDURE — 83002 ASSAY OF GONADOTROPIN (LH): CPT | Performed by: FAMILY MEDICINE

## 2024-12-30 PROCEDURE — 82550 ASSAY OF CK (CPK): CPT | Performed by: INTERNAL MEDICINE

## 2024-12-30 PROCEDURE — 82728 ASSAY OF FERRITIN: CPT | Performed by: FAMILY MEDICINE

## 2024-12-30 PROCEDURE — 80050 GENERAL HEALTH PANEL: CPT | Performed by: INTERNAL MEDICINE

## 2024-12-30 PROCEDURE — 80061 LIPID PANEL: CPT | Performed by: FAMILY MEDICINE

## 2024-12-30 PROCEDURE — 84550 ASSAY OF BLOOD/URIC ACID: CPT | Performed by: INTERNAL MEDICINE

## 2024-12-30 RX ORDER — ATORVASTATIN CALCIUM 20 MG/1
20 TABLET, FILM COATED ORAL NIGHTLY
Qty: 90 TABLET | Refills: 3 | Status: SHIPPED | OUTPATIENT
Start: 2024-12-30

## 2024-12-30 RX ORDER — LOSARTAN POTASSIUM 25 MG/1
25 TABLET ORAL DAILY
Qty: 90 TABLET | Refills: 3 | Status: SHIPPED | OUTPATIENT
Start: 2024-12-30

## 2024-12-30 NOTE — PROGRESS NOTES
Baylee Granados is a 49 year old female who presents for a complete physical exam, no gyn.  HPI:     Chief Complaint   Patient presents with    Physical     Reviewed Preventative/Wellness form with patient.        Patient feels well, dental visit up to date, no hearing problem.  Pt would like to loose weight, feels tired, depression much better.    Exercise: walking.  Diet: watches sugar closely    Wt Readings from Last 3 Encounters:   12/30/24 (!) 340 lb (154.2 kg)   08/28/24 (!) 340 lb (154.2 kg)   08/01/24 (!) 340 lb (154.2 kg)      BP Readings from Last 3 Encounters:   12/30/24 126/78   08/28/24 120/70   08/01/24 120/76     No LMP recorded. Patient has had an ablation.         Current Outpatient Medications   Medication Sig Dispense Refill    atorvastatin 20 MG Oral Tab Take 1 tablet (20 mg total) by mouth nightly. 90 tablet 3    losartan 25 MG Oral Tab Take 1 tablet (25 mg total) by mouth daily. 90 tablet 3    PANTOPRAZOLE 40 MG Oral Tab EC Take 1 tablet (40 mg total) by mouth every morning before breakfast. 90 tablet 0    Tirzepatide-Weight Management (ZEPBOUND) 7.5 MG/0.5ML Subcutaneous Solution Auto-injector Inject 7.5 mg into the skin once a week. 2 mL 1    Adapalene 0.3 % External Gel Pea size amount to the entire face starting every third night building skin up to nightly use      ALPRAZolam 0.25 MG Oral Tab TAKE ONE TABLET BY MOUTH UP TO THREE TIMES A DAY AS NEEDED FOR ANXIETY OR PANIC ATTACK      lurasidone 20 MG Oral Tab Take 1 tablet (20 mg total) by mouth in the morning and 1 tablet (20 mg total) before bedtime.      NURTEC 75 MG Oral Tablet Dispersible DISSOLVE ONE TABLET BY MOUTH AS NEEDED AT THE ONSET OF MIGRAINE.  MAXIMUM DOSE IN 24 HOURS IS 1 TABLET (75MG). 8 tablet 2    escitalopram (LEXAPRO) 10 MG Oral Tab Take 1 tablet (10 mg total) by mouth every morning. 30 tablet 0    Dapsone (ACZONE) 7.5 % External Gel Apply 1 Application topically daily.      topiramate 25 MG Oral Tab Take 3  tablets (75 mg total) by mouth 2 (two) times daily. 540 tablet 1    colchicine 0.6 MG Oral Tab Take 1 tablet (0.6 mg total) by mouth daily. 90 tablet 1    ergocalciferol 1.25 MG (48719 UT) Oral Cap Take 1 capsule (50,000 Units total) by mouth once a week. 4 capsule 3    albuterol 108 (90 Base) MCG/ACT Inhalation Aero Soln Inhale 2 puffs into the lungs every 4 (four) hours as needed for Wheezing. 1 each 0    aspirin 81 MG Oral Tab EC Take 1 tablet (81 mg total) by mouth daily.      Multiple Vitamin (MULTI-VITAMIN DAILY OR) Take 1 tablet by mouth daily.        gabapentin 100 MG Oral Cap Take 1 capsule (100 mg total) by mouth. Can take up to 4 capsules daily (Patient not taking: Reported on 12/30/2024)      Meloxicam 15 MG Oral Tab Take 1 tablet (15 mg total) by mouth in the morning. (Patient not taking: Reported on 12/30/2024) 30 tablet 2      Past Medical History:    Arrhythmia    a flutter after 4 th pregnancy and stabilized after medication. Recent dx w/ covid 9/2022    Asthma ([facility])    Calculus of kidney    ? 2005    Diabetes ([facility])    not on any medication    Extrinsic asthma, unspecified    Gout    High blood pressure    High cholesterol    History of COVID-19    covid penumonia, tachycardia + hospitalized sx's x 2 weeks.    History of COVID-19    fever x 3days , cough x 1 month  congestion x 1-2 weeks, covid pneumonia. + hospitalized    Migraine, unspecified, without mention of intractable migraine without mention of status migrainosus    Mitral valve disorders(424.0)    Morbid obesity with body mass index (BMI) of 50.0 to 59.9 in adult ([facility])    Neuropathy    SELINA (obstructive sleep apnea)    AHI 18 REM AHI 45 Supine AHI 49 Sao2 Freddy 69%     Osteoarthritis    right hand    Pneumonia due to organism    covid pna 12/2020 and 09/2022    PONV (postoperative nausea and vomiting)    Unspecified gastritis and gastroduodenitis without mention of hemorrhage    Visual impairment    glasses    Vitamin D deficiency      Past  Surgical History:   Procedure Laterality Date      , , ,     Cholecystectomy      D & c      Endometrial ablation      Leg/ankle surgery proc unlisted  1998    ankle    Other surgical history      rt wrist carpal tunnel 2023    Tubal ligation            Family History   Problem Relation Age of Onset    Other (CVA) Paternal Grandmother     Other (CVA) Paternal Grandfather     Other (CVA) Maternal Grandmother     Other (leukemia) Father         also had HEP C    Hypertension Mother     Other (hyperlipidemia) Mother     Other (RA) Mother       Social History     Socioeconomic History    Marital status:    Tobacco Use    Smoking status: Never     Passive exposure: Never    Smokeless tobacco: Never   Vaping Use    Vaping status: Never Used   Substance and Sexual Activity    Alcohol use: Yes     Comment: 1 -2 a month    Drug use: No   Other Topics Concern    Caffeine Concern No     Comment: 2x per week    Exercise Yes     Comment: walks 1 mile 1 day a week    Seat Belt Yes     Social Drivers of Health     Financial Resource Strain: Low Risk  (4/15/2024)    Financial Resource Strain     Med Affordability: No   Food Insecurity: No Food Insecurity (4/15/2024)    Food Insecurity     Food Insecurity: Never true   Transportation Needs: No Transportation Needs (4/15/2024)    Transportation Needs     Lack of Transportation: No   Housing Stability: Low Risk  (4/15/2024)    Housing Stability     Housing Instability: No        REVIEW OF SYSTEMS:   GENERAL HEALTH: feels well, no fatigue.  SKIN: denies any unusual skin lesions or rashes  EYES: no visual complaints or deficits  HEENT: denies nasal congestion, sinus pain or sore throat; hearing loss negative   RESPIRATORY: denies shortness of breath, wheezing or cough   CARDIOVASCULAR: denies chest pain, SOB, edema,orthopnea, no palpitations   GI: denies nausea, vomiting, constipation, diarrhea; no rectal bleeding; no heartburn  GENITAL/:  no dysuria, urgency or frequency  MUSCULOSKELETAL: no joint complaints upper or lower extremities  NEURO: no sensory or motor complaint  HEMATOLOGY: denies hx anemia; denies bruising or excessive bleeding  ENDOCRINE: denies excessive thirst or urination; denies unexpected wt gain or wt loss  ALLERGY/IMM.: denies food or seasonal allergies  PSYCH: no symptoms of depression or anxiety      EXAM:   /78   Pulse 86   Ht 5' 7.5\" (1.715 m)   Wt (!) 340 lb (154.2 kg)   SpO2 98%   BMI 52.47 kg/m²      General: WD/WN in no acute distress.   HEENT: PERRLA and EOMI.  OP moist no lesions.  Neck is supple, with no cervical LAD or thyroid abnormalities. No carotid bruits.    Lungs: are clear to auscultation bilaterally, with no wheeze, rhonchi, or rales.   Heart: is RRR.  S1, S2, with no murmurs,clicks, gallops  Abdomen: is soft,NBS, NT/ND with no HSM.  No rebound or guarding. No CVA tenderness, no hernias.  Neuro: Cranial nerves II-XII normal,no focal abnormalities, and reflexes coordination and gait normal and symmetric.Sensation intact.  Extremities: are symmetric with no cyanosis, clubbing, or edema.  MS: Normal muscles tones, no joints abnormalities.  SKIN: Normal color, turgor, no lesions, rashes or wounds.  PSYCH: Normal affect and mood.          ASSESSMENT AND PLAN:     Baylee Granados is a 49 year old female who   Referral for colonoscopy.    Mixed hyperlipidemia  -     atorvastatin 20 MG Oral Tab; Take 1 tablet (20 mg total) by mouth nightly.  -     Lipid Panel; Future    Visit for screening mammogram  -     [facility] MARGARITA 2D+3D SCREENING BILAT (CPT=77067/93623); Future    Menopause  -     FSH AND LH; Future  -     Estrogens Fractionated, S [E]; Future    Screening for endocrine, nutritional, metabolic and immunity disorder    Other fatigue  -     Vitamin B12; Future  -     Iron And Tibc; Future  -     Ferritin; Future  HNT: doing well.  -     losartan 25 MG Oral Tab; Take 1 tablet (25 mg total) by mouth  daily.         Pt's was recommended low fat diet and aerobic exercise 30 minutes three times weekly.   Health maintenance.   The patient indicates understanding of these issues and agrees to the plan.  The patient is asked to return in one year.

## 2024-12-30 NOTE — PATIENT INSTRUCTIONS
Dr. Jerri Colvin,    General surgery, endoscopies, colonoscopies.        [facility] - GENERAL SURGERY   [address] [facility]  (Opened 6/15/22)   [place]  [address]   PH:  [phone] FAX:  [phone]       [address], [address]  [place] [address]    Phone:[phone]  Fax:[phone]

## 2024-12-31 ENCOUNTER — TELEPHONE (OUTPATIENT)
Dept: RHEUMATOLOGY | Facility: CLINIC | Age: 49
End: 2024-12-31

## 2024-12-31 ENCOUNTER — PATIENT MESSAGE (OUTPATIENT)
Dept: RHEUMATOLOGY | Facility: CLINIC | Age: 49
End: 2024-12-31

## 2024-12-31 DIAGNOSIS — R79.89 LOW VITAMIN D LEVEL: Primary | ICD-10-CM

## 2024-12-31 RX ORDER — ERGOCALCIFEROL 1.25 MG/1
50000 CAPSULE, LIQUID FILLED ORAL WEEKLY
Qty: 12 CAPSULE | Refills: 0 | Status: SHIPPED | OUTPATIENT
Start: 2024-12-31 | End: 2025-03-25

## 2025-01-02 DIAGNOSIS — M10.9 GOUT, UNSPECIFIED CAUSE, UNSPECIFIED CHRONICITY, UNSPECIFIED SITE: Primary | ICD-10-CM

## 2025-01-02 LAB
ESTRADIOL: 14.6 PG/ML
ESTRONE: 30 PG/ML
VITAMIN B6: 8.6 UG/L

## 2025-01-02 RX ORDER — ALLOPURINOL 300 MG/1
600 TABLET ORAL DAILY
Qty: 180 TABLET | Refills: 2 | Status: SHIPPED | OUTPATIENT
Start: 2025-01-02

## 2025-01-02 NOTE — TELEPHONE ENCOUNTER
Last office visit: 8/1/2024      Next Rheum Apt:3/27/2025 Rohit Her MD    Last fill: allopurinol 300  1/4/2024   135 tabs, 2 refills     Labs:   Lab Results   Component Value Date    CREATSERUM 0.91 12/30/2024    GFR 78 01/03/2018    ALKPHO 92 12/30/2024    AST 35 (H) 12/30/2024    ALT 48 12/30/2024    BILT 0.6 12/30/2024    TP 7.2 12/30/2024    ALB 4.3 12/30/2024       Lab Results   Component Value Date    WBC 4.8 12/30/2024    HGB 14.0 12/30/2024    .0 12/30/2024    NEPRELIM 2.31 12/30/2024    NEPERCENT 47.9 12/30/2024    LYPERCENT 38.6 12/30/2024    NE 2.31 12/30/2024    LYMABS 1.86 12/30/2024

## 2025-01-17 NOTE — PATIENT INSTRUCTIONS
Prevention Guidelines, Women Ages 36 to 52  Screening tests and vaccines are an important part of managing your health. A screening test is done to find diseases in people who don't have any symptoms.  The goal is to find a disease early so lifestyle zambrano sigmoidoscopy every 5 years, or  · Colonoscopy every 10 years, or  · CT colonography (virtual colonoscopy) every 5 years, or  · Yearly fecal occult blood test, or  · Yearly fecal immunochemical test every year, or  · Stool DNA test, every 3 years  If you c least 4 weeks after the first dose   Hepatitis A Women at increased risk for infection–talk with your healthcare provider 2 doses given 6 months apart   Hepatitis B Women at increased risk for infection–talk with your healthcare provider 3 doses over 6 mon American Academy of Ophthalmology  Roel last reviewed this educational content on 11/1/2017  © 7165-2550 [employer]. [address] [facility], [address] [place] [address]. All rights reserved.  This information is not intended as a substitute for pro 2 weeks

## 2025-01-21 DIAGNOSIS — G89.29 CHRONIC MUSCULOSKELETAL PAIN: ICD-10-CM

## 2025-01-21 DIAGNOSIS — Z51.81 THERAPEUTIC DRUG MONITORING: ICD-10-CM

## 2025-01-21 DIAGNOSIS — M1A.09X0 IDIOPATHIC CHRONIC GOUT OF MULTIPLE SITES WITHOUT TOPHUS: Primary | ICD-10-CM

## 2025-01-21 DIAGNOSIS — M79.18 CHRONIC MUSCULOSKELETAL PAIN: ICD-10-CM

## 2025-01-22 RX ORDER — COLCHICINE 0.6 MG/1
0.6 TABLET ORAL 2 TIMES DAILY
Qty: 180 TABLET | Refills: 0 | Status: SHIPPED | OUTPATIENT
Start: 2025-01-22

## 2025-01-22 NOTE — TELEPHONE ENCOUNTER
Last office visit: 8/1/2024    Next Rheum Apt:3/27/2025 Rohit Her MD    Last fill: colchicine 8/5/2024  90 tabs     Labs:   Lab Results   Component Value Date    CREATSERUM 0.91 12/30/2024    GFR 78 01/03/2018    ALKPHO 92 12/30/2024    AST 35 (H) 12/30/2024    ALT 48 12/30/2024    BILT 0.6 12/30/2024    TP 7.2 12/30/2024    ALB 4.3 12/30/2024       Lab Results   Component Value Date    WBC 4.8 12/30/2024    HGB 14.0 12/30/2024    .0 12/30/2024    NEPRELIM 2.31 12/30/2024    NEPERCENT 47.9 12/30/2024    LYPERCENT 38.6 12/30/2024    NE 2.31 12/30/2024    LYMABS 1.86 12/30/2024

## 2025-02-06 ENCOUNTER — OFFICE VISIT (OUTPATIENT)
Dept: FAMILY MEDICINE CLINIC | Facility: CLINIC | Age: 50
End: 2025-02-06
Payer: COMMERCIAL

## 2025-02-06 ENCOUNTER — HOSPITAL ENCOUNTER (OUTPATIENT)
Dept: GENERAL RADIOLOGY | Age: 50
Discharge: HOME | End: 2025-02-06
Attending: NURSE PRACTITIONER
Payer: COMMERCIAL

## 2025-02-06 VITALS
OXYGEN SATURATION: 99 % | BODY MASS INDEX: 45.99 KG/M2 | HEIGHT: 67 IN | TEMPERATURE: 98 F | WEIGHT: 293 LBS | SYSTOLIC BLOOD PRESSURE: 122 MMHG | DIASTOLIC BLOOD PRESSURE: 76 MMHG | RESPIRATION RATE: 16 BRPM | HEART RATE: 107 BPM

## 2025-02-06 DIAGNOSIS — J01.40 ACUTE NON-RECURRENT PANSINUSITIS: ICD-10-CM

## 2025-02-06 DIAGNOSIS — R05.8 COUGH WITH SPUTUM: ICD-10-CM

## 2025-02-06 DIAGNOSIS — J45.21 MILD INTERMITTENT ASTHMA WITH ACUTE EXACERBATION (HCC): ICD-10-CM

## 2025-02-06 DIAGNOSIS — R05.8 COUGH WITH SPUTUM: Primary | ICD-10-CM

## 2025-02-06 LAB
OPERATOR ID: NORMAL
POCT LOT NUMBER: NORMAL
RAPID SARS-COV-2 BY PCR: NOT DETECTED

## 2025-02-06 PROCEDURE — 3008F BODY MASS INDEX DOCD: CPT | Performed by: NURSE PRACTITIONER

## 2025-02-06 PROCEDURE — U0002 COVID-19 LAB TEST NON-CDC: HCPCS | Performed by: NURSE PRACTITIONER

## 2025-02-06 PROCEDURE — 3078F DIAST BP <80 MM HG: CPT | Performed by: NURSE PRACTITIONER

## 2025-02-06 PROCEDURE — 71046 X-RAY EXAM CHEST 2 VIEWS: CPT | Performed by: NURSE PRACTITIONER

## 2025-02-06 PROCEDURE — 99214 OFFICE O/P EST MOD 30 MIN: CPT | Performed by: NURSE PRACTITIONER

## 2025-02-06 PROCEDURE — 3074F SYST BP LT 130 MM HG: CPT | Performed by: NURSE PRACTITIONER

## 2025-02-06 RX ORDER — PREDNISONE 20 MG/1
40 TABLET ORAL DAILY
Qty: 10 TABLET | Refills: 0 | Status: SHIPPED | OUTPATIENT
Start: 2025-02-06 | End: 2025-02-11

## 2025-02-06 NOTE — PROGRESS NOTES
CHIEF COMPLAINT:     Chief Complaint   Patient presents with    Flu     7 days Cough, congestion, runny nose, fatigue, denies fever  OTC dayquil, mucinex, robotusin, cough drops       HPI:   Baylee Granados is a 49 year old female who presents for upper respiratory symptoms for  1 weeks. Patient reports sore throat, congestion, dry cough, cough with occasional white colored sputum, cough is keeping pt up at night. Symptoms have been worsening since onset.  Treating symptoms with OTC cold meds.  + Fatigue, no fever, chills, aches.   Current Outpatient Medications   Medication Sig Dispense Refill    COLCHICINE 0.6 MG Oral Tab TAKE ONE TABLET BY MOUTH TWICE DAILY 180 tablet 0    allopurinol 300 MG Oral Tab Take 2 tablets (600 mg total) by mouth daily. 180 tablet 2    ergocalciferol 1.25 MG (24560 UT) Oral Cap Take 1 capsule (50,000 Units total) by mouth once a week. 12 capsule 0    atorvastatin 20 MG Oral Tab Take 1 tablet (20 mg total) by mouth nightly. 90 tablet 3    losartan 25 MG Oral Tab Take 1 tablet (25 mg total) by mouth daily. 90 tablet 3    PANTOPRAZOLE 40 MG Oral Tab EC Take 1 tablet (40 mg total) by mouth every morning before breakfast. 90 tablet 0    Tirzepatide-Weight Management (ZEPBOUND) 7.5 MG/0.5ML Subcutaneous Solution Auto-injector Inject 7.5 mg into the skin once a week. 2 mL 1    Adapalene 0.3 % External Gel Pea size amount to the entire face starting every third night building skin up to nightly use      ALPRAZolam 0.25 MG Oral Tab TAKE ONE TABLET BY MOUTH UP TO THREE TIMES A DAY AS NEEDED FOR ANXIETY OR PANIC ATTACK      gabapentin 100 MG Oral Cap Take 1 capsule (100 mg total) by mouth. Can take up to 4 capsules daily (Patient not taking: Reported on 12/30/2024)      lurasidone 20 MG Oral Tab Take 1 tablet (20 mg total) by mouth in the morning and 1 tablet (20 mg total) before bedtime.      NURTEC 75 MG Oral Tablet Dispersible DISSOLVE ONE TABLET BY MOUTH AS NEEDED AT THE ONSET OF  MIGRAINE.  MAXIMUM DOSE IN 24 HOURS IS 1 TABLET (75MG). 8 tablet 2    escitalopram (LEXAPRO) 10 MG Oral Tab Take 1 tablet (10 mg total) by mouth every morning. 30 tablet 0    Dapsone (ACZONE) 7.5 % External Gel Apply 1 Application topically daily.      topiramate 25 MG Oral Tab Take 3 tablets (75 mg total) by mouth 2 (two) times daily. 540 tablet 1    ergocalciferol 1.25 MG (41918 UT) Oral Cap Take 1 capsule (50,000 Units total) by mouth once a week. 4 capsule 3    albuterol 108 (90 Base) MCG/ACT Inhalation Aero Soln Inhale 2 puffs into the lungs every 4 (four) hours as needed for Wheezing. 1 each 0    Meloxicam 15 MG Oral Tab Take 1 tablet (15 mg total) by mouth in the morning. (Patient not taking: Reported on 12/30/2024) 30 tablet 2    aspirin 81 MG Oral Tab EC Take 1 tablet (81 mg total) by mouth daily.      Multiple Vitamin (MULTI-VITAMIN DAILY OR) Take 1 tablet by mouth daily.          Past Medical History:    Arrhythmia    a flutter after 4 th pregnancy and stabilized after medication. Recent dx w/ covid 9/2022    Asthma ([facility])    Calculus of kidney    ? 2005    Diabetes ([facility])    not on any medication    Extrinsic asthma, unspecified    Gout    High blood pressure    High cholesterol    History of COVID-19    covid penumonia, tachycardia + hospitalized sx's x 2 weeks.    History of COVID-19    fever x 3days , cough x 1 month  congestion x 1-2 weeks, covid pneumonia. + hospitalized    Migraine, unspecified, without mention of intractable migraine without mention of status migrainosus    Mitral valve disorders(424.0)    Morbid obesity with body mass index (BMI) of 50.0 to 59.9 in adult ([facility])    Neuropathy    SELINA (obstructive sleep apnea)    AHI 18 REM AHI 45 Supine AHI 49 Sao2 Freddy 69%     Osteoarthritis    right hand    Pneumonia due to organism    covid pna 12/2020 and 09/2022    PONV (postoperative nausea and vomiting)    Unspecified gastritis and gastroduodenitis without mention of hemorrhage    Visual  impairment    glasses    Vitamin D deficiency      Past Surgical History:   Procedure Laterality Date      199/, , ,     Cholecystectomy      D & c      Endometrial ablation      Leg/ankle surgery proc unlisted  1998    ankle    Other surgical history      rt wrist carpal tunnel 2023    Tubal ligation               Social History     Socioeconomic History    Marital status:    Tobacco Use    Smoking status: Never     Passive exposure: Never    Smokeless tobacco: Never   Vaping Use    Vaping status: Never Used   Substance and Sexual Activity    Alcohol use: Yes     Comment: 1 -2 a month    Drug use: No   Other Topics Concern    Caffeine Concern No     Comment: 2x per week    Exercise Yes     Comment: walks 1 mile 1 day a week    Seat Belt Yes     Social Drivers of Health     Food Insecurity: No Food Insecurity (4/15/2024)    Food Insecurity     Food Insecurity: Never true   Transportation Needs: No Transportation Needs (4/15/2024)    Transportation Needs     Lack of Transportation: No   Housing Stability: Low Risk  (4/15/2024)    Housing Stability     Housing Instability: No         REVIEW OF SYSTEMS:   GENERAL: feels well otherwise,   no appetite  SKIN: no rashes or abnormal skin lesions  HEENT: See HPI  LUNGS: denies shortness of breath or wheezing, See HPI  CARDIOVASCULAR: denies chest pain or palpitations   GI: denies N/V/C or abdominal pain  NEURO: Denies headaches    EXAM:   /76   Pulse 107   Temp 97.7 °F (36.5 °C)   Resp 16   Ht 5' 7\" (1.702 m)   Wt (!) 336 lb (152.4 kg)   SpO2 99%   BMI 52.63 kg/m²   GENERAL: well developed, well nourished,in no apparent distress  SKIN: no rashes,no suspicious lesions  HEAD: atraumatic, normocephalic.  no tenderness on palpation of maxillary or frontal sinuses  EYES: conjunctiva clear, EOM intact  EARS: TM's pearly, no bulging, no retraction,mild serous fluid, bony landmarks visualized  NOSE: Nostrils patent, clear to  cloudy nasal discharge, nasal mucosa pink and moist  THROAT: Oral mucosa pink, moist. Posterior pharynx is not erythematous. no exudates. Tonsils 1/4.    NECK: Supple, non-tender  LUNGS:Lungs appear clear, but somewhat diminished, may be due to body habitus.  NO wheezing, crackles auscultated.  Wheezy, wet cough on occasion during exam. .  CARDIO: RRR without murmur  EXTREMITIES: no cyanosis, clubbing or edema  LYMPH:  No cervical lymphadenopathy.        ASSESSMENT AND PLAN:   Baylee Granados is a 49 year old female who presents with     ASSESSMENT:   Encounter Diagnoses   Name Primary?    Cough with sputum Yes    Mild intermittent asthma with acute exacerbation (HCC)        PLAN:    Rapid Covid: Negative  Chest XR: PROCEDURE:  XR CHEST PA + LAT CHEST (CPT=71046)     INDICATIONS:  R05.8 Cough with sputum     COMPARISON:  [facility], XR, XR CHEST PA + LAT CHEST (CPT=71046), 11/20/2023, 4:31 PM.     TECHNIQUE:  PA and lateral chest radiographs were obtained.     PATIENT STATED HISTORY: (As transcribed by Technologist)  The patient has a cough and congestion.         FINDINGS:    LUNGS:  No focal consolidation.  Normal vascularity.  Lung fields are clear.  CARDIAC:  Heart size and vascularity are normal.  MEDIASTINUM:  No interval change.  PLEURA:  No pleural effusion or pneumothorax.  BONES:  No acute cardiopulmonary abnormality is identified.                  Impression  CONCLUSION:  No acute cardiopulmonary abnormality identified.        LOCATION:  [place]        Dictated by (CST): Dequan Crockett MD on 2/06/2025 at 5:50 PM      Finalized by (CST): Dequan Crockett MD on 2/06/2025 at 5:52 PM      Meds as below.  Mucinex to help thin secretions.    Follow up with PCP if no improvement in 3-5 days, sooner if worsening.  If any sob/wheezing seek emergent care.Comfort care as described in Patient Instructions    Meds & Refills for this Visit:  Requested Prescriptions      No prescriptions  requested or ordered in this encounter       Risks, benefits, and side effects of medication explained and discussed.    There are no Patient Instructions on file for this visit.    The patient indicates understanding of these issues and agrees to the plan.  The patient is asked to return if sx's persist or worsen.

## 2025-03-19 ENCOUNTER — OFFICE VISIT (OUTPATIENT)
Dept: FAMILY MEDICINE CLINIC | Facility: CLINIC | Age: 50
End: 2025-03-19
Payer: COMMERCIAL

## 2025-03-19 VITALS
HEART RATE: 90 BPM | DIASTOLIC BLOOD PRESSURE: 80 MMHG | RESPIRATION RATE: 25 BRPM | SYSTOLIC BLOOD PRESSURE: 138 MMHG | WEIGHT: 293 LBS | HEIGHT: 67 IN | BODY MASS INDEX: 45.99 KG/M2 | OXYGEN SATURATION: 98 %

## 2025-03-19 DIAGNOSIS — R05.8 COUGH WITH SPUTUM: Primary | ICD-10-CM

## 2025-03-19 PROCEDURE — 3008F BODY MASS INDEX DOCD: CPT

## 2025-03-19 PROCEDURE — 3079F DIAST BP 80-89 MM HG: CPT

## 2025-03-19 PROCEDURE — 99213 OFFICE O/P EST LOW 20 MIN: CPT

## 2025-03-19 PROCEDURE — 3075F SYST BP GE 130 - 139MM HG: CPT

## 2025-03-19 RX ORDER — DEXTROMETHORPHAN HBR. AND GUAIFENESIN 10; 100 MG/5ML; MG/5ML
SOLUTION ORAL
Qty: 355 ML | Refills: 0 | Status: SHIPPED | OUTPATIENT
Start: 2025-03-19

## 2025-03-19 NOTE — PROGRESS NOTES
[facility] Family Medicine Office Note  Chief Complaint:   Chief Complaint   Patient presents with    Follow - Up     [facility] 2/6/25, states the cough has improved, pt states she is still having a productive cough       HPI:   Baylee Granados is a 49 year old female coming in for follow-up on a cough.  She previously went to the [place] walk-in clinic in [place] on 2/6/2025 due to 7 days of cough with congestion runny nose and fatigue.  Walk-in clinic did a chest x-ray and COVID test which were both negative.  They also prescribed her Augmentin and prednisone and diagnosed her with cough with sputum and mild intermittent asthma with acute extubation and recurrent pansinusitis.    Today she reports that her cough is improved but the cough is still nonproductive.  Cough is worse in the morning where it becomes productive and then transitions to nonproductive throughout the day.  In the morning she coughs up clear sputum.  Has had tried over-the-counter cough drops.  Feels like cough is worse when she is talking or singing.  He was originally using albuterol inhaler but has not recently.    Past Medical History:    Arrhythmia    a flutter after 4 th pregnancy and stabilized after medication. Recent dx w/ covid 9/2022    Asthma ([facility])    Calculus of kidney    ? 2005    Diabetes ([facility])    not on any medication    Extrinsic asthma, unspecified    Gout    High blood pressure    High cholesterol    History of COVID-19    covid penumonia, tachycardia + hospitalized sx's x 2 weeks.    History of COVID-19    fever x 3days , cough x 1 month  congestion x 1-2 weeks, covid pneumonia. + hospitalized    Migraine, unspecified, without mention of intractable migraine without mention of status migrainosus    Mitral valve disorders(424.0)    Morbid obesity with body mass index (BMI) of 50.0 to 59.9 in adult (HCC)    Neuropathy    SELINA (obstructive sleep apnea)    AHI 18 REM AHI 45 Supine AHI 49 Sao2 Freddy 69%     Osteoarthritis     right hand    Pneumonia due to organism    covid pna 2020 and 2022    PONV (postoperative nausea and vomiting)    Unspecified gastritis and gastroduodenitis without mention of hemorrhage    Visual impairment    glasses    Vitamin D deficiency     Past Surgical History:   Procedure Laterality Date      , , ,     Cholecystectomy      D & c      Endometrial ablation      Leg/ankle surgery proc unlisted  1998    ankle    Other surgical history      rt wrist carpal tunnel 2023    Tubal ligation           Social History:  Social History     Socioeconomic History    Marital status:    Tobacco Use    Smoking status: Never     Passive exposure: Never    Smokeless tobacco: Never   Vaping Use    Vaping status: Never Used   Substance and Sexual Activity    Alcohol use: Yes     Comment: 1 -2 a month    Drug use: No   Other Topics Concern    Caffeine Concern No     Comment: 2x per week    Exercise Yes     Comment: walks 1 mile 1 day a week    Seat Belt Yes     Social Drivers of Health     Food Insecurity: No Food Insecurity (4/15/2024)    Food Insecurity     Food Insecurity: Never true   Transportation Needs: No Transportation Needs (4/15/2024)    Transportation Needs     Lack of Transportation: No   Housing Stability: Low Risk  (4/15/2024)    Housing Stability     Housing Instability: No     Family History:  Family History   Problem Relation Age of Onset    Other (CVA) Paternal Grandmother     Other (CVA) Paternal Grandfather     Other (CVA) Maternal Grandmother     Other (leukemia) Father         also had HEP C    Hypertension Mother     Other (hyperlipidemia) Mother     Other (RA) Mother      Allergies:  Allergies   Allergen Reactions    Darvocet [Propoxyphene N-Apap] HIVES     N 50 TABS    Demerol HIVES     TABS    Grape ANAPHYLAXIS    Hydrocodone-Acetaminophen OTHER (SEE COMMENTS)     ASA TABS  ASA TABS    Lortab HIVES     ASA TABS    Morphine HIVES     Derivatives       Propoxyphene OTHER (SEE COMMENTS)     N 50 TABS  N 50 TABS    Metformin DIARRHEA     Current Meds:  Current Outpatient Medications   Medication Sig Dispense Refill    dextromethorphan-guaiFENesin  MG/5ML Oral Liquid Take 5 mL by mouth every 12 (twelve) hours as needed for the cough 355 mL 0    COLCHICINE 0.6 MG Oral Tab TAKE ONE TABLET BY MOUTH TWICE DAILY 180 tablet 0    allopurinol 300 MG Oral Tab Take 2 tablets (600 mg total) by mouth daily. 180 tablet 2    atorvastatin 20 MG Oral Tab Take 1 tablet (20 mg total) by mouth nightly. 90 tablet 3    losartan 25 MG Oral Tab Take 1 tablet (25 mg total) by mouth daily. 90 tablet 3    PANTOPRAZOLE 40 MG Oral Tab EC Take 1 tablet (40 mg total) by mouth every morning before breakfast. 90 tablet 0    Tirzepatide-Weight Management (ZEPBOUND) 7.5 MG/0.5ML Subcutaneous Solution Auto-injector Inject 7.5 mg into the skin once a week. 2 mL 1    Adapalene 0.3 % External Gel Pea size amount to the entire face starting every third night building skin up to nightly use      lurasidone 20 MG Oral Tab Take 1 tablet (20 mg total) by mouth in the morning and 1 tablet (20 mg total) before bedtime.      NURTEC 75 MG Oral Tablet Dispersible DISSOLVE ONE TABLET BY MOUTH AS NEEDED AT THE ONSET OF MIGRAINE.  MAXIMUM DOSE IN 24 HOURS IS 1 TABLET (75MG). 8 tablet 2    escitalopram (LEXAPRO) 10 MG Oral Tab Take 1 tablet (10 mg total) by mouth every morning. 30 tablet 0    Dapsone (ACZONE) 7.5 % External Gel Apply 1 Application topically daily.      topiramate 25 MG Oral Tab Take 3 tablets (75 mg total) by mouth 2 (two) times daily. 540 tablet 1    ergocalciferol 1.25 MG (16121 UT) Oral Cap Take 1 capsule (50,000 Units total) by mouth once a week. 4 capsule 3    albuterol 108 (90 Base) MCG/ACT Inhalation Aero Soln Inhale 2 puffs into the lungs every 4 (four) hours as needed for Wheezing. 1 each 0    aspirin 81 MG Oral Tab EC Take 1 tablet (81 mg total) by mouth daily.      Multiple Vitamin  (MULTI-VITAMIN DAILY OR) Take 1 tablet by mouth daily.          Counseling given: Not Answered       REVIEW OF SYSTEMS:   Review of Systems   Constitutional:  Negative for fever.   Respiratory:  Positive for cough. Negative for shortness of breath, wheezing and stridor.    Cardiovascular:  Negative for chest pain and palpitations.        EXAM:   /80   Pulse 90   Resp 25   Ht 5' 7\" (1.702 m)   Wt (!) 335 lb (152 kg)   SpO2 98%   BMI 52.47 kg/m²  Estimated body mass index is 52.47 kg/m² as calculated from the following:    Height as of this encounter: 5' 7\" (1.702 m).    Weight as of this encounter: 335 lb (152 kg).   Vital signs reviewed.Appears stated age, well groomed.  Physical Exam  Constitutional:       Appearance: Normal appearance.   HENT:      Nose: Nose normal. No congestion or rhinorrhea.   Cardiovascular:      Rate and Rhythm: Normal rate and regular rhythm.   Pulmonary:      Effort: Pulmonary effort is normal. No respiratory distress.      Breath sounds: Normal breath sounds. No stridor. No wheezing, rhonchi or rales.   Chest:      Chest wall: No tenderness.   Skin:     General: Skin is warm and dry.   Neurological:      Mental Status: She is alert and oriented to person, place, and time.   Psychiatric:         Mood and Affect: Mood normal.         Behavior: Behavior normal.         Thought Content: Thought content normal.         Judgment: Judgment normal.          ASSESSMENT AND PLAN:   1. Cough with sputum  Had a long discussion that the cough is most likely aftereffects from recent infection she had.  Discussed to try Robitussin to help stop the urge to cough as well as break up any chest congestion that she continues to have.  Also discussed increasing hydration to decrease viscosity of mucus and any postnasal drip causing irritation of the throat resulting in coughing.  Advised to follow-up if no improvement.  At that point may consider an inhaled corticosteroid since she does have a  history of intermittent asthma.  - dextromethorphan-guaiFENesin  MG/5ML Oral Liquid; Take 5 mL by mouth every 12 (twelve) hours as needed for the cough  Dispense: 355 mL; Refill: 0      Meds, Orders, & Refills for this Visit:  Requested Prescriptions     Signed Prescriptions Disp Refills    dextromethorphan-guaiFENesin  MG/5ML Oral Liquid 355 mL 0     Sig: Take 5 mL by mouth every 12 (twelve) hours as needed for the cough         Imaging & Consults:  None      Health Maintenance:      Problem List:  Patient Active Problem List   Diagnosis    Essential hypertension    Mixed hyperlipidemia    Intermittent asthma, well controlled (HCC)    Migraine headache    Carpal tunnel syndrome of right wrist    SELINA on CPAP    Subclinical hypothyroidism    Transaminitis    Idiopathic gout of multiple sites    Diabetic polyneuropathy associated with type 2 diabetes mellitus (HCC)    PTSD (post-traumatic stress disorder)    Current severe episode of major depressive disorder without psychotic features without prior episode (HCC)         Patient/Caregiver Education: Patient/Caregiver Education: There are no barriers to learning. Medical education done.   Outcome: Baylee Granados verbalizes understanding, agrees with the plan, and had no other questions at the end of today's visit. Baylee Granados is informed to call with any questions, complications, allergies, or worsening or changing symptoms.  Baylee Granados is to call with any side effects or complications from the treatments as a result of today.     Follow-up in   Return if symptoms worsen or fail to improve.    Note to patient: The 21st Century Cures Act makes medical notes like these available to patients in the interest of transparency. However, this is a medical document intended as peer to peer communication. It is written in medical language and may contain abbreviations or verbiage that are unfamiliar. It may appear blunt or direct.  Medical documents are intended to carry relevant information, facts as evident, and the clinical opinion of the practitioner.

## 2025-03-19 NOTE — PATIENT INSTRUCTIONS
Monitor blood pressure at home  If >140/90, schedule follow-up appointment for blood pressure recheck

## 2025-03-27 ENCOUNTER — OFFICE VISIT (OUTPATIENT)
Dept: RHEUMATOLOGY | Facility: CLINIC | Age: 50
End: 2025-03-27
Payer: COMMERCIAL

## 2025-03-27 VITALS
TEMPERATURE: 97 F | RESPIRATION RATE: 16 BRPM | DIASTOLIC BLOOD PRESSURE: 80 MMHG | SYSTOLIC BLOOD PRESSURE: 138 MMHG | BODY MASS INDEX: 45.99 KG/M2 | HEIGHT: 67 IN | OXYGEN SATURATION: 97 % | WEIGHT: 293 LBS | HEART RATE: 90 BPM

## 2025-03-27 DIAGNOSIS — E11.42 DIABETIC POLYNEUROPATHY ASSOCIATED WITH TYPE 2 DIABETES MELLITUS (HCC): ICD-10-CM

## 2025-03-27 DIAGNOSIS — Z51.81 THERAPEUTIC DRUG MONITORING: ICD-10-CM

## 2025-03-27 DIAGNOSIS — G89.29 CHRONIC MUSCULOSKELETAL PAIN: ICD-10-CM

## 2025-03-27 DIAGNOSIS — M1A.09X0 IDIOPATHIC CHRONIC GOUT OF MULTIPLE SITES WITHOUT TOPHUS: Primary | ICD-10-CM

## 2025-03-27 DIAGNOSIS — R79.89 LOW VITAMIN D LEVEL: ICD-10-CM

## 2025-03-27 DIAGNOSIS — M79.18 CHRONIC MUSCULOSKELETAL PAIN: ICD-10-CM

## 2025-03-27 PROCEDURE — 99214 OFFICE O/P EST MOD 30 MIN: CPT | Performed by: INTERNAL MEDICINE

## 2025-03-27 PROCEDURE — 3075F SYST BP GE 130 - 139MM HG: CPT | Performed by: INTERNAL MEDICINE

## 2025-03-27 PROCEDURE — 3008F BODY MASS INDEX DOCD: CPT | Performed by: INTERNAL MEDICINE

## 2025-03-27 PROCEDURE — 3079F DIAST BP 80-89 MM HG: CPT | Performed by: INTERNAL MEDICINE

## 2025-03-27 PROCEDURE — G2211 COMPLEX E/M VISIT ADD ON: HCPCS | Performed by: INTERNAL MEDICINE

## 2025-03-27 RX ORDER — DEXTROAMPHETAMINE SACCHARATE, AMPHETAMINE ASPARTATE MONOHYDRATE, DEXTROAMPHETAMINE SULFATE AND AMPHETAMINE SULFATE 3.75; 3.75; 3.75; 3.75 MG/1; MG/1; MG/1; MG/1
CAPSULE, EXTENDED RELEASE ORAL
COMMUNITY
Start: 2025-02-18

## 2025-03-27 RX ORDER — LAMOTRIGINE 100 MG/1
TABLET ORAL
COMMUNITY
Start: 2025-03-17

## 2025-03-27 RX ORDER — LURASIDONE HYDROCHLORIDE 40 MG/1
TABLET, FILM COATED ORAL
COMMUNITY
Start: 2025-03-17

## 2025-03-27 RX ORDER — COLCHICINE 0.6 MG/1
0.6 TABLET ORAL 2 TIMES DAILY
Qty: 180 TABLET | Refills: 1 | Status: SHIPPED | OUTPATIENT
Start: 2025-03-27

## 2025-03-27 NOTE — PROGRESS NOTES
Rheumatology Follow-up Patient Note  =====================================================================================================  ?  Chief Complaint:   Gout    Chief Complaint   Patient presents with    Gout     7 month f/u. Feeling good. Increase of medication has made symptoms stable. No gout flares. No new symptoms. Converted rapid score of 1.7     PCP  Yamile Villeda MD  Fax: [phone]  Phone: [phone]  =====================================================================================================  HPI    Baylee Granados is a 49 year old female     ==============================================================================================================  Visit: 08/01/24  Went to [facility] outpatient program for mental health issues. Doing better.   -will return to work sooner.  -Migraines more common.   -taking allopurinol 450 mg daily and colchicine 0.6 mg daily.  More adhered recently.  -no recent gout flares.  -on zepbound  -off cymbalta, on gabapentin that she takes 100 mg 4 times daily.  Still with neuropathy in the base of the feet from time to time  -Youngest son, high school senior.  Waiting football offers from [employer] and [employer]  ==============================================================================================================  Today's Visit: 03/27/2025    She has been managing gout for the past two years. Recent blood work indicated that her uric acid levels were not at the desired level, leading to an increase in her allopurinol dosage from 450 to 600 mg daily and colchicine to twice a day. She notes significant improvement with this regimen, stating she no longer experiences numbness and has not had any flare-ups in the past nine months to a year.     She experiences soreness in her thumb, particularly on her right hand, but has not used any treatments like Voltaren gel or a thumb brace. She describes the pain as variable, with some days being  better than others.    She mentions a history of surgery on her left ankle 27 years ago, which has been a source of discomfort in the past. However, she reports that her ankle is currently much better, with no numbness or tingling, and she attributes this improvement to the optimized gout treatment.    She has a son who is currently attending [employer] on a football scholarship and a daughter graduating from dental hygiene school. She is planning to attend both of her graduations, which are on the same day.    5 point ROS negative except noted above  I had reviewed past medical and family histories together with allergy and medication lists documented.    Medications:  Outpatient Medications Marked as Taking for the 3/27/25 encounter (Office Visit) with Rohit Her MD   Medication Sig Dispense Refill    Amphetamine-Dextroamphet ER 15 MG Oral Capsule SR 24 Hr Take 1 oral capsule every morning      FLUoxetine 20 MG Oral Cap Take 1 oral capsule once a day      lamoTRIgine 100 MG Oral Tab Take 1 oral tablet once a day      lurasidone 40 MG Oral Tab Take one oral tablet daily with 350 calories      colchicine 0.6 MG Oral Tab Take 1 tablet (0.6 mg total) by mouth 2 (two) times daily. 180 tablet 1    allopurinol 300 MG Oral Tab Take 2 tablets (600 mg total) by mouth daily. 180 tablet 2    atorvastatin 20 MG Oral Tab Take 1 tablet (20 mg total) by mouth nightly. 90 tablet 3    losartan 25 MG Oral Tab Take 1 tablet (25 mg total) by mouth daily. 90 tablet 3    PANTOPRAZOLE 40 MG Oral Tab EC Take 1 tablet (40 mg total) by mouth every morning before breakfast. 90 tablet 0    Adapalene 0.3 % External Gel Pea size amount to the entire face starting every third night building skin up to nightly use      NURTEC 75 MG Oral Tablet Dispersible DISSOLVE ONE TABLET BY MOUTH AS NEEDED AT THE ONSET OF MIGRAINE.  MAXIMUM DOSE IN 24 HOURS IS 1 TABLET (75MG). 8 tablet 2    Dapsone (ACZONE) 7.5 % External Gel Apply 1 Application  topically daily.      topiramate 25 MG Oral Tab Take 3 tablets (75 mg total) by mouth 2 (two) times daily. 540 tablet 1    ergocalciferol 1.25 MG (39617 UT) Oral Cap Take 1 capsule (50,000 Units total) by mouth once a week. 4 capsule 3    albuterol 108 (90 Base) MCG/ACT Inhalation Aero Soln Inhale 2 puffs into the lungs every 4 (four) hours as needed for Wheezing. 1 each 0    aspirin 81 MG Oral Tab EC Take 1 tablet (81 mg total) by mouth daily.      Multiple Vitamin (MULTI-VITAMIN DAILY OR) Take 1 tablet by mouth daily.         Modified Medications    Modified Medication Previous Medication    COLCHICINE 0.6 MG ORAL TAB COLCHICINE 0.6 MG Oral Tab       Take 1 tablet (0.6 mg total) by mouth 2 (two) times daily.    TAKE ONE TABLET BY MOUTH TWICE DAILY     Medications Discontinued During This Encounter   Medication Reason    dextromethorphan-guaiFENesin  MG/5ML Oral Liquid     Tirzepatide-Weight Management (ZEPBOUND) 7.5 MG/0.5ML Subcutaneous Solution Auto-injector     lurasidone 20 MG Oral Tab     escitalopram (LEXAPRO) 10 MG Oral Tab     COLCHICINE 0.6 MG Oral Tab      ?  Allergies:  Allergies   Allergen Reactions    Darvocet [Propoxyphene N-Apap] HIVES     N 50 TABS    Demerol HIVES     TABS    Grape ANAPHYLAXIS    Hydrocodone-Acetaminophen OTHER (SEE COMMENTS)     ASA TABS  ASA TABS    Lortab HIVES     ASA TABS    Morphine HIVES     Derivatives      Propoxyphene OTHER (SEE COMMENTS)     N 50 TABS  N 50 TABS    Metformin DIARRHEA     Objective  Vitals:    03/27/25 0942   BP: 138/80   Pulse: 90   Resp: 16   Temp: 97.3 °F (36.3 °C)   SpO2: 97%   Weight: (!) 334 lb (151.5 kg)   Height: 5' 7\" (1.702 m)     GEN: NAD, well-nourished.   HEENT: Head: NCAT. Face: No lesions. Eyes: Conjunctiva clear.   PULM:  easy effort  Extremities: No cyanosis, edema or deformities.   Neurologic: Strength, CN2-12 grossly intact   Skin: No lesions or rashes.  MSK: 28 joint count performed. No evidence of synovitis in mcp, pip, dip,  wrist, elbows, shoulders, hips, knees, ankles, mtp unless otherwise noted. Full ROM of elbows, wrists, knees.     No active synovitis    Labs:    Additional Labs:    12/2024  Vitamin D11.1    12/2023  Vitamin D 9.6  TSH WNL    Lab Results   Component Value Date     12/30/2024     12/27/2023     12/30/2022     (H) 12/16/2022    CK 2,132 (H) 12/13/2022     07/05/2022     07/29/2021    CK 97 11/09/2020       Lab Results   Component Value Date    URIC 6.1 12/30/2024    URIC 6.7 (H) 12/27/2023    URIC 6.1 (H) 12/30/2022    URIC 6.8 (H) 12/13/2022    URIC 4.5 07/05/2022    URIC 7.3 (H) 07/29/2021    URIC 6.9 (H) 07/07/2021    URIC 9.1 (H) 01/27/2021    URIC 8.3 (H) 05/28/2020     Lab Results   Component Value Date    WBC 4.8 12/30/2024    RBC 5.09 12/30/2024    HGB 14.0 12/30/2024    HCT 43.6 12/30/2024    .0 12/30/2024    MCV 85.7 12/30/2024    MCH 27.5 12/30/2024    MCHC 32.1 12/30/2024    RDW 13.4 12/30/2024    NEPRELIM 2.31 12/30/2024    NEPERCENT 47.9 12/30/2024    LYPERCENT 38.6 12/30/2024    MOPERCENT 7.5 12/30/2024    EOPERCENT 4.6 12/30/2024    BAPERCENT 1.2 12/30/2024    NE 2.31 12/30/2024    LYMABS 1.86 12/30/2024    MOABSO 0.36 12/30/2024    EOABSO 0.22 12/30/2024    BAABSO 0.06 12/30/2024     Lab Results   Component Value Date     (H) 12/30/2024    BUN 13 12/30/2024    BUNCREA 16.7 07/29/2021    CREATSERUM 0.91 12/30/2024    ANIONGAP 6 12/30/2024    GFR 78 01/03/2018    GFRNAA 56 (L) 07/05/2022    GFRAA 65 07/05/2022    CA 9.7 12/30/2024    OSMOCALC 291 12/30/2024    ALKPHO 92 12/30/2024    AST 35 (H) 12/30/2024    ALT 48 12/30/2024    BILT 0.6 12/30/2024    TP 7.2 12/30/2024    ALB 4.3 12/30/2024    GLOBULIN 2.9 12/30/2024    AGRATIO 1.4 04/27/2013     12/30/2024    K 3.9 12/30/2024     12/30/2024    CO2 26.0 12/30/2024 7/2022:  RF/CCP neg    7/2021  [id number] neg    Radiology:    Radiology review:       =====================================================================================================  Assessment and Plan  Assessment:  1. Idiopathic chronic gout of multiple sites without tophus    2. Therapeutic drug monitoring    3. Chronic musculoskeletal pain    4. Low vitamin D level    5. Diabetic polyneuropathy associated with type 2 diabetes mellitus (HCC)      # Tophaceous gout: Hx of left podagra, left ankle swelling, diffuse left foot swelling, left Achilles tendinitis with probable involvement of the right MTPs as well, ?right MCP2 involvement  -No recent gout flares, however swelling of the bilateral feet/MTPs still raises some concern for chronic MSU deposition.  -Last serum urate not at goal while on 450 mg of allopurinol, increased to 600 mg daily since that assessment  -Continues to have improvement in her tophaceous gout     # Bilateral lower extremity edema: d/t venous insufficiency.  Negative duplex Dopplers in the past.    -Improved     #Diabetic neuropathy, chronic musculoskeletal pain:   -Improved with Cymbalta in the past.  -Now off Cymbalta given on Lexapro per psych.  -Partially controlled with gabapentin 100 mg 4 times daily as needed in the past; now able to taper off  -Interestingly neuropathy has improved despite being off neuropathic pain medications.  So I suspect tophaceous gout was causing some degree of peripheral nerve entrapment    High risk medication labs including CMP and complete blood count with differential (CBC w/ diff) reviewed from 12/2024. Results are stable. [id number] neg    Plan:  Gout:  -repeat comprehensive metabolic panel (CMP) and uric acid and CK and vit D (given low levels previously) in 3 months.   -Allopurinol 300 mg: continue 2 pills daily to equal 600  mg allopurinol.  This should get serum urate to target.  -continue colchicine 0.6 mg twice daily; consider stopping at next appointment. Repeat CK for monitoring.    Low vitamin D in the past, repeat  vitamin D    Rtc 6 months    Diagnoses and all orders for this visit:    Idiopathic chronic gout of multiple sites without tophus  -     colchicine 0.6 MG Oral Tab; Take 1 tablet (0.6 mg total) by mouth 2 (two) times daily.  -     CK (Creatine Kinase) (Not Creatinine); Future  -     Uric Acid; Future  -     Comp Metabolic Panel (14); Future  -     Vitamin D; Future  -     CBC With Differential With Platelet; Future    Therapeutic drug monitoring  -     colchicine 0.6 MG Oral Tab; Take 1 tablet (0.6 mg total) by mouth 2 (two) times daily.  -     CK (Creatine Kinase) (Not Creatinine); Future  -     Uric Acid; Future  -     Comp Metabolic Panel (14); Future  -     Vitamin D; Future  -     CBC With Differential With Platelet; Future    Chronic musculoskeletal pain  -     colchicine 0.6 MG Oral Tab; Take 1 tablet (0.6 mg total) by mouth 2 (two) times daily.  -     CK (Creatine Kinase) (Not Creatinine); Future  -     Uric Acid; Future  -     Comp Metabolic Panel (14); Future  -     Vitamin D; Future  -     CBC With Differential With Platelet; Future    Low vitamin D level  -     colchicine 0.6 MG Oral Tab; Take 1 tablet (0.6 mg total) by mouth 2 (two) times daily.  -     CK (Creatine Kinase) (Not Creatinine); Future  -     Uric Acid; Future  -     Comp Metabolic Panel (14); Future  -     Vitamin D; Future  -     CBC With Differential With Platelet; Future    Diabetic polyneuropathy associated with type 2 diabetes mellitus (HCC)        No follow-ups on file.      The above plan of care, diagnosis, orders, and follow-up were discussed with the patient. Questions related to this recommended plan of care were answered.    Thank you for referring this delightful patient to me. Please feel free to contact me with any questions.     This report was performed utilizing speech recognition software technology. Despite proofreading, speech recognition errors could escape detection. If a word or phrase is confusing or out of context,  please do not hesitate to call for   clarification.       Kind regards      Rohit Her MD  [facility]

## 2025-03-27 NOTE — PROGRESS NOTES
The following individual(s) verbally consented to be recorded using ambient AI listening technology and understand that they can each withdraw their consent to this listening technology at any point by asking the clinician to turn off or pause the recording:    Patient name: Baylee Granados  Additional names:

## 2025-06-19 ENCOUNTER — TELEPHONE (OUTPATIENT)
Facility: CLINIC | Age: 50
End: 2025-06-19

## 2025-06-19 DIAGNOSIS — M10.9 GOUT, UNSPECIFIED CAUSE, UNSPECIFIED CHRONICITY, UNSPECIFIED SITE: ICD-10-CM

## 2025-06-19 RX ORDER — ALLOPURINOL 300 MG/1
750 TABLET ORAL DAILY
Qty: 225 TABLET | Refills: 2 | Status: SHIPPED | OUTPATIENT
Start: 2025-06-19

## 2025-06-23 RX ORDER — DAPSONE 75 MG/G
1 GEL TOPICAL DAILY
Qty: 90 G | Refills: 1 | Status: SHIPPED | OUTPATIENT
Start: 2025-06-23

## 2025-07-23 DIAGNOSIS — K21.9 GASTROESOPHAGEAL REFLUX DISEASE, UNSPECIFIED WHETHER ESOPHAGITIS PRESENT: ICD-10-CM

## 2025-07-24 RX ORDER — PANTOPRAZOLE SODIUM 40 MG/1
40 TABLET, DELAYED RELEASE ORAL
Qty: 90 TABLET | Refills: 1 | Status: SHIPPED | OUTPATIENT
Start: 2025-07-24

## 2025-07-25 NOTE — TELEPHONE ENCOUNTER
Refill passed per [facility] protocol.    Requested Prescriptions   Pending Prescriptions Disp Refills    PANTOPRAZOLE 40 MG Oral Tab EC [Pharmacy Med Name: Pantoprazole Sodium Ec 40 Mg Tab Nort] 90 tablet 0     Sig: Take 1 tablet (40 mg total) by mouth every morning before breakfast.       Gastrointestional Medication Protocol Passed - 7/24/2025  9:18 PM        Passed - In person appointment or virtual visit in the past 12 mos or appointment in next 3 mos     Recent Outpatient Visits              3 months ago Idiopathic chronic gout of multiple sites without tophus    [facility], [address] [place] [address] Rohit Her MD    Office Visit    4 months ago Cough with sputum    [facility], [address] Russell Mcmanus PA-C    Office Visit    5 months ago Cough with sputum    [facility], Walk-In Clinic, [facility] Deloris Erazo APRN    Office Visit    6 months ago Routine general medical examination at a health care facility    [address] Yamile Villeda MD    Office Visit    8 months ago Therapeutic drug monitoring    [facility], [address] [place], [place] [address]Alexandra APRN    Telemedicine          Future Appointments         Provider Department Appt Notes    In 1 week Yamile Villeda MD [address] Lab results    In 2 months Rohit Her MD [address] 6 mo fu                    Passed - Medication is active on med list           Future Appointments         Provider Department Appt Notes    In 1 week Yamile Villeda MD [address] Lab results    In 2 months Rohit Her MD [address] 6 mo fu          Recent Outpatient Visits              3 months ago Idiopathic  chronic gout of multiple sites without tophus    [facility], [address], [place] Rohit Her MD    Office Visit    4 months ago Cough with sputum    [facility], [place] [address], [place] Russell Mcmanus PA-C    Office Visit    5 months ago Cough with sputum    [facility], Walk-In Clinic, [facility], [place] Deloris Erazo, APRN    Office Visit    6 months ago Routine general medical examination at a health care facility    [facility], [facility], [place] Yamile Villeda MD    Office Visit    8 months ago Therapeutic drug monitoring    [facility], [address], Alexandra Cifuentes, [place]    Telemedicine

## 2025-07-28 ENCOUNTER — TELEPHONE (OUTPATIENT)
Dept: FAMILY MEDICINE CLINIC | Facility: CLINIC | Age: 50
End: 2025-07-28

## 2025-07-30 ENCOUNTER — MED REC SCAN ONLY (OUTPATIENT)
Dept: FAMILY MEDICINE CLINIC | Facility: CLINIC | Age: 50
End: 2025-07-30

## 2025-08-06 ENCOUNTER — OFFICE VISIT (OUTPATIENT)
Dept: FAMILY MEDICINE CLINIC | Facility: CLINIC | Age: 50
End: 2025-08-06

## 2025-08-06 ENCOUNTER — TELEPHONE (OUTPATIENT)
Dept: FAMILY MEDICINE CLINIC | Facility: CLINIC | Age: 50
End: 2025-08-06

## 2025-08-06 ENCOUNTER — LAB ENCOUNTER (OUTPATIENT)
Dept: LAB | Age: 50
End: 2025-08-06
Attending: FAMILY MEDICINE

## 2025-08-06 VITALS
DIASTOLIC BLOOD PRESSURE: 72 MMHG | OXYGEN SATURATION: 99 % | WEIGHT: 293 LBS | SYSTOLIC BLOOD PRESSURE: 138 MMHG | BODY MASS INDEX: 45.99 KG/M2 | HEART RATE: 83 BPM | HEIGHT: 67 IN

## 2025-08-06 DIAGNOSIS — E11.9 TYPE 2 DIABETES MELLITUS WITHOUT COMPLICATION, WITHOUT LONG-TERM CURRENT USE OF INSULIN (HCC): Primary | ICD-10-CM

## 2025-08-06 DIAGNOSIS — Z23 NEED FOR SHINGLES VACCINE: ICD-10-CM

## 2025-08-06 DIAGNOSIS — Z12.11 COLON CANCER SCREENING: ICD-10-CM

## 2025-08-06 DIAGNOSIS — Z23 NEED FOR TDAP VACCINATION: ICD-10-CM

## 2025-08-06 DIAGNOSIS — E11.9 TYPE 2 DIABETES MELLITUS WITHOUT COMPLICATION, WITHOUT LONG-TERM CURRENT USE OF INSULIN (HCC): ICD-10-CM

## 2025-08-06 LAB
CREAT UR-SCNC: 154.1 MG/DL
MICROALBUMIN UR-MCNC: <0.3 MG/DL

## 2025-08-06 PROCEDURE — 90715 TDAP VACCINE 7 YRS/> IM: CPT | Performed by: FAMILY MEDICINE

## 2025-08-06 PROCEDURE — 90750 HZV VACC RECOMBINANT IM: CPT | Performed by: FAMILY MEDICINE

## 2025-08-06 PROCEDURE — 3075F SYST BP GE 130 - 139MM HG: CPT | Performed by: FAMILY MEDICINE

## 2025-08-06 PROCEDURE — 82043 UR ALBUMIN QUANTITATIVE: CPT | Performed by: FAMILY MEDICINE

## 2025-08-06 PROCEDURE — 99214 OFFICE O/P EST MOD 30 MIN: CPT | Performed by: FAMILY MEDICINE

## 2025-08-06 PROCEDURE — 82570 ASSAY OF URINE CREATININE: CPT | Performed by: FAMILY MEDICINE

## 2025-08-06 PROCEDURE — 3008F BODY MASS INDEX DOCD: CPT | Performed by: FAMILY MEDICINE

## 2025-08-06 PROCEDURE — 3078F DIAST BP <80 MM HG: CPT | Performed by: FAMILY MEDICINE

## 2025-08-06 PROCEDURE — 90471 IMMUNIZATION ADMIN: CPT | Performed by: FAMILY MEDICINE

## 2025-08-06 PROCEDURE — 90472 IMMUNIZATION ADMIN EACH ADD: CPT | Performed by: FAMILY MEDICINE

## 2025-08-06 RX ORDER — TIRZEPATIDE 2.5 MG/.5ML
0.5 INJECTION, SOLUTION SUBCUTANEOUS WEEKLY
Qty: 2 ML | Refills: 3 | Status: SHIPPED | OUTPATIENT
Start: 2025-08-06

## 2025-08-06 RX ORDER — TIRZEPATIDE 2.5 MG/.5ML
0.5 INJECTION, SOLUTION SUBCUTANEOUS WEEKLY
Qty: 2 ML | Refills: 5 | Status: SHIPPED | OUTPATIENT
Start: 2025-08-06

## 2025-08-21 DIAGNOSIS — E11.9 TYPE 2 DIABETES MELLITUS WITHOUT COMPLICATION, WITHOUT LONG-TERM CURRENT USE OF INSULIN (HCC): ICD-10-CM

## 2025-08-21 RX ORDER — TIRZEPATIDE 2.5 MG/.5ML
0.5 INJECTION, SOLUTION SUBCUTANEOUS WEEKLY
Qty: 2 ML | Refills: 3 | Status: CANCELLED | OUTPATIENT
Start: 2025-08-21

## 2025-08-25 ENCOUNTER — HOSPITAL ENCOUNTER (EMERGENCY)
Age: 50
Discharge: HOME | End: 2025-08-25
Attending: EMERGENCY MEDICINE

## 2025-08-25 ENCOUNTER — APPOINTMENT (OUTPATIENT)
Dept: CT IMAGING | Age: 50
End: 2025-08-25
Attending: EMERGENCY MEDICINE

## 2025-08-25 VITALS
HEIGHT: 67 IN | SYSTOLIC BLOOD PRESSURE: 145 MMHG | WEIGHT: 293 LBS | BODY MASS INDEX: 45.99 KG/M2 | DIASTOLIC BLOOD PRESSURE: 77 MMHG | OXYGEN SATURATION: 99 % | HEART RATE: 89 BPM | RESPIRATION RATE: 16 BRPM | TEMPERATURE: 98 F

## 2025-08-25 DIAGNOSIS — K76.0 HEPATIC STEATOSIS: ICD-10-CM

## 2025-08-25 DIAGNOSIS — R93.89 INCREASED ENDOMETRIAL STRIPE THICKNESS: ICD-10-CM

## 2025-08-25 DIAGNOSIS — R10.30 LOWER ABDOMINAL PAIN: Primary | ICD-10-CM

## 2025-08-25 LAB
ALBUMIN SERPL-MCNC: 4.7 G/DL (ref 3.2–4.8)
ALBUMIN/GLOB SERPL: 1.7 (ref 1–2)
ALP LIVER SERPL-CCNC: 110 U/L (ref 41–108)
ALT SERPL-CCNC: 48 U/L (ref 10–49)
ANION GAP SERPL CALC-SCNC: 9 MMOL/L (ref 0–18)
AST SERPL-CCNC: 35 U/L (ref ?–34)
BASOPHILS # BLD AUTO: 0.05 X10(3) UL (ref 0–0.2)
BASOPHILS NFR BLD AUTO: 0.7 %
BILIRUB SERPL-MCNC: 1.3 MG/DL (ref 0.3–1.2)
BILIRUB UR QL STRIP.AUTO: NEGATIVE
BUN BLD-MCNC: 13 MG/DL (ref 9–23)
CALCIUM BLD-MCNC: 9.8 MG/DL (ref 8.7–10.6)
CHLORIDE SERPL-SCNC: 104 MMOL/L (ref 98–112)
CLARITY UR REFRACT.AUTO: CLEAR
CO2 SERPL-SCNC: 27 MMOL/L (ref 21–32)
COLOR UR AUTO: YELLOW
CREAT BLD-MCNC: 1.04 MG/DL (ref 0.55–1.02)
EGFRCR SERPLBLD CKD-EPI 2021: 65 ML/MIN/1.73M2 (ref 60–?)
EOSINOPHIL # BLD AUTO: 0.16 X10(3) UL (ref 0–0.7)
EOSINOPHIL NFR BLD AUTO: 2.2 %
ERYTHROCYTE [DISTWIDTH] IN BLOOD BY AUTOMATED COUNT: 13.5 %
GLOBULIN PLAS-MCNC: 2.7 G/DL (ref 2–3.5)
GLUCOSE BLD-MCNC: 110 MG/DL (ref 70–99)
GLUCOSE UR STRIP.AUTO-MCNC: NEGATIVE MG/DL
HCT VFR BLD AUTO: 43.5 % (ref 35–48)
HGB BLD-MCNC: 14.3 G/DL (ref 12–16)
IMM GRANULOCYTES # BLD AUTO: 0.03 X10(3) UL (ref 0–1)
IMM GRANULOCYTES NFR BLD: 0.4 %
KETONES UR STRIP.AUTO-MCNC: NEGATIVE MG/DL
LEUKOCYTE ESTERASE UR QL STRIP.AUTO: NEGATIVE
LIPASE SERPL-CCNC: 27 U/L (ref 12–53)
LYMPHOCYTES # BLD AUTO: 1.88 X10(3) UL (ref 1–4)
LYMPHOCYTES NFR BLD AUTO: 25.9 %
MCH RBC QN AUTO: 28.4 PG (ref 26–34)
MCHC RBC AUTO-ENTMCNC: 32.9 G/DL (ref 31–37)
MCV RBC AUTO: 86.3 FL (ref 80–100)
MONOCYTES # BLD AUTO: 0.45 X10(3) UL (ref 0.1–1)
MONOCYTES NFR BLD AUTO: 6.2 %
NEUTROPHILS # BLD AUTO: 4.68 X10 (3) UL (ref 1.5–7.7)
NEUTROPHILS # BLD AUTO: 4.68 X10(3) UL (ref 1.5–7.7)
NEUTROPHILS NFR BLD AUTO: 64.6 %
NITRITE UR QL STRIP.AUTO: NEGATIVE
OSMOLALITY SERPL CALC.SUM OF ELEC: 291 MOSM/KG (ref 275–295)
PH UR STRIP.AUTO: 6.5 (ref 5–8)
PLATELET # BLD AUTO: 183 10(3)UL (ref 150–450)
PLATELETS.RETICULATED NFR BLD AUTO: 10.3 % (ref 0–7)
POTASSIUM SERPL-SCNC: 4 MMOL/L (ref 3.5–5.1)
PROT SERPL-MCNC: 7.4 G/DL (ref 5.7–8.2)
PROT UR STRIP.AUTO-MCNC: NEGATIVE MG/DL
RBC # BLD AUTO: 5.04 X10(6)UL (ref 3.8–5.3)
RBC UR QL AUTO: NEGATIVE
SODIUM SERPL-SCNC: 140 MMOL/L (ref 136–145)
SP GR UR STRIP.AUTO: 1.02 (ref 1–1.03)
UROBILINOGEN UR STRIP.AUTO-MCNC: 1 MG/DL
WBC # BLD AUTO: 7.3 X10(3) UL (ref 4–11)

## 2025-08-25 PROCEDURE — 99285 EMERGENCY DEPT VISIT HI MDM: CPT

## 2025-08-25 PROCEDURE — 81003 URINALYSIS AUTO W/O SCOPE: CPT | Performed by: EMERGENCY MEDICINE

## 2025-08-25 PROCEDURE — 96375 TX/PRO/DX INJ NEW DRUG ADDON: CPT

## 2025-08-25 PROCEDURE — 83690 ASSAY OF LIPASE: CPT | Performed by: EMERGENCY MEDICINE

## 2025-08-25 PROCEDURE — 96361 HYDRATE IV INFUSION ADD-ON: CPT

## 2025-08-25 PROCEDURE — 80053 COMPREHEN METABOLIC PANEL: CPT | Performed by: EMERGENCY MEDICINE

## 2025-08-25 PROCEDURE — 85025 COMPLETE CBC W/AUTO DIFF WBC: CPT | Performed by: EMERGENCY MEDICINE

## 2025-08-25 PROCEDURE — 74177 CT ABD & PELVIS W/CONTRAST: CPT | Performed by: EMERGENCY MEDICINE

## 2025-08-25 PROCEDURE — 96374 THER/PROPH/DIAG INJ IV PUSH: CPT

## 2025-08-25 RX ORDER — KETOROLAC TROMETHAMINE 30 MG/ML
30 INJECTION, SOLUTION INTRAMUSCULAR; INTRAVENOUS ONCE
Status: COMPLETED | OUTPATIENT
Start: 2025-08-25 | End: 2025-08-25

## 2025-08-25 RX ORDER — DICYCLOMINE HCL 20 MG
10 TABLET ORAL 4 TIMES DAILY PRN
Qty: 15 TABLET | Refills: 0 | Status: SHIPPED | OUTPATIENT
Start: 2025-08-25

## 2025-08-25 RX ORDER — HYDROMORPHONE HYDROCHLORIDE 1 MG/ML
0.5 INJECTION, SOLUTION INTRAMUSCULAR; INTRAVENOUS; SUBCUTANEOUS ONCE
Refills: 0 | Status: COMPLETED | OUTPATIENT
Start: 2025-08-25 | End: 2025-08-25

## 2025-08-26 ENCOUNTER — TELEPHONE (OUTPATIENT)
Dept: OBGYN CLINIC | Facility: CLINIC | Age: 50
End: 2025-08-26

## 2025-08-29 PROCEDURE — 87624 HPV HI-RISK TYP POOLED RSLT: CPT | Performed by: OBSTETRICS & GYNECOLOGY

## (undated) DIAGNOSIS — M10.09 IDIOPATHIC GOUT OF MULTIPLE SITES, UNSPECIFIED CHRONICITY: Primary | ICD-10-CM

## (undated) DEVICE — LOW PROFILE (LP) BLADE ASSEMBLY 6PK: Brand: MICROAIRE®

## (undated) DEVICE — STERILE POLYISOPRENE POWDER-FREE SURGICAL GLOVES: Brand: PROTEXIS

## (undated) DEVICE — GOWN,SIRUS,FABRIC-REINFORCED,X-LARGE: Brand: MEDLINE

## (undated) DEVICE — MINI-BLADE®: Brand: BEAVER®

## (undated) DEVICE — CLOSURE EXOFIN 1.0ML

## (undated) DEVICE — STERILE POLYISOPRENE POWDER-FREE SURGICAL GLOVES WITH EMOLLIENT COATING: Brand: PROTEXIS

## (undated) DEVICE — DISPOSABLE TOURNIQUET CUFF SINGLE BLADDER, DUAL PORT AND QUICK CONNECT CONNECTOR: Brand: COLOR CUFF

## (undated) DEVICE — UPPER EXTREMITY CDS-LF: Brand: MEDLINE INDUSTRIES, INC.

## (undated) DEVICE — SUT MONOCRYL 4-0 P-3 Y494G

## (undated) DEVICE — SOL NACL IRRIG 0.9% 1000ML BTL

## (undated) DEVICE — DISPOSABLE BIPOLAR FORCEPS 4" (10.2CM) JEWELERS, STRAIGHT 0.4MM TIP AND 12 FT. (3.6M) CABLE: Brand: KIRWAN

## (undated) DEVICE — 3M™ TEGADERM™ +PAD FILM DRESSING WITH NON-ADHERENT PAD, 3587, 3-1/2 IN X 4-1/8 IN (9 CM X 10.5 CM), 25/CAR, 4 CAR/CS: Brand: 3M™ TEGADERM™

## (undated) DEVICE — GAUZE TRAY STERILE 4X4 12PLY

## (undated) DEVICE — SOLUTION ANTIFOG W/ SPONGE

## (undated) DEVICE — ALCOHOL 70% 4 OZ

## (undated) DEVICE — 3M™ STERI-STRIP™ REINFORCED ADHESIVE SKIN CLOSURES, R1547, 1/2 IN X 4 IN (12 MM X 100 MM), 6 STRIPS/ENVELOPE: Brand: 3M™ STERI-STRIP™

## (undated) NOTE — LETTER
Date: 6/14/2024    Patient Name: Baylee Granados          To Whom it may concern:      Baylee is currently my patient. Her last Physical was 12/27/23, & she is in good physical health & may work without restrictions.             Sincerely,    Yamile Villeda MD

## (undated) NOTE — LETTER
Date: 2019    Patient Name: Delicia Sanchez  : 1975    To Whom it may concern: The above patient was seen at the [facility] for treatment of a medical condition.     This patient should be excused from attending work from 1

## (undated) NOTE — LETTER
ASTHMA ACTION PLAN for Rhea Rankin     : 1975     Date: 12/10/2018  Provider:  Komal Thornton MD  Phone for doctor or clinic: [facility], [place] [id number]  [facility] [id number] [phone]    ACT Score: 2

## (undated) NOTE — MR AVS SNAPSHOT
[place] [address] [address] [facility] [id number] [phone]               Thank you for choosing us for your health care visit with Raciel Parsons MD.  We are glad to serve you and happy to provide you with this summary of Long-term control (also called “maintenance” or controller) medicines help reduce swelling and inflammation of the airways. Some keep the muscles around your airways relaxed. This makes the airways less sensitive to triggers and less likely to flare up.  Lo · When traveling by air, keep your medicines with you, not packed in your luggage. · Make sure you know how to tell if your inhaler is empty. Ask your provider or pharmacist, or check the instructions that come with your inhaler.   Working with your health to learn more about acute bronchitis. What causes acute bronchitis? Acute bronchitis almost always starts as a viral respiratory infection, such as a cold or the flu. Certain factors make it more likely for a cold or flu to turn into bronchitis.  These steps include stopping smoking and avoiding tobacco smoke, washing your hands often, and getting a yearly flu shot.   When to call the doctor  Call the doctor if you have any of the following:  · Fever of 100.4°F (38.0°C) higher  · Symptoms that get worse, clots.) Aspirin should never be given to anyone younger than 25years of age who is ill with a viral infection or fever. It may cause severe liver or brain damage. · Your appetite may be poor, so a light diet is fine.  Avoid dehydration by drinking 6 to 8 professional's instructions.              Your Appointments     Jan 07, 2017 10:45 AM   [facility]-VISIT with Clarisa Herman MD   [address]    [address] [facility] [address]   [phone] - ipratropium-albuterol 0.5-2.5 (3) MG/3ML Soln  - predniSONE 20 MG Tabs  - Promethazine-DM 6.25-15 MG/5ML Syrp            Referral Information     Referral Order Referred to Blake Fried Phone Visits Status Diagnosis                 MyChart     Call the h You don’t need to join a gym. Home exercises work great.  Put more priority on exercise in your life                    Visit [facility] online at  Merged with [email or web address]

## (undated) NOTE — LETTER
ASTHMA ACTION PLAN for Anita Marin     : 1975     Date: 2020  Provider:  More Lassiter MD  Phone for doctor or clinic: [address] [facility], [facility] [address], [facility] [address]

## (undated) NOTE — LETTER
Date & Time: 4/6/2022, 4:09 AM  Patient: Lakisha Favorite  Encounter Provider(s):    Alvarado Gandhi MD       To Whom It May Concern:    Celestia Siemens was seen and treated in our department on 4/5/2022. She should not return to work until 04/07/2022.     If you have any questions or concerns, please do not hesitate to call.        _____________________________  Physician/APC Signature

## (undated) NOTE — Clinical Note
I think this patient would do amazing with you, she is hesitant for therapy but willing to try!    Dr. Mickey Yu

## (undated) NOTE — MR AVS SNAPSHOT
[address] [id number] [phone]               Thank you for choosing us for your health care visit with Maite Stevenson MD.  We are glad to serve you and happy to provide you with this summary of ASA TABS    Morphine Hives    Derivatives                  Today's Vital Signs     BP Pulse Temp Height Weight BMI    120/80 mmHg 72 98.7 °F (37.1 °C) (Oral) 67.5\" 321 lb 49.50 kg/m2         Current Medications          This list is accurate as of: 5/20/

## (undated) NOTE — LETTER
Date: 9/16/2022    Patient Name: Dio Mae          To Whom it may concern: This letter has been written at the patient's request. The above patient was seen at the [facility] for treatment of a medical condition. This patient should be excused from attending work from 09/12 through 09/20. The patient may return to work on 09/21.          Sincerely,    Glenda Da Silva MD

## (undated) NOTE — LETTER
[place], [place], [address]    Phone: [phone]  Fax: [phone]    BITA: [id number]   [email or web address]     New Prescription  Patient Name: Len Vuong YOB: 1975   Patient Ad Blood Glucose Monitoring Suppl (ACCU-CHEK TARA PLUS) w/Device Does not   apply Kit 1 Device by Other route 2 (two) times daily.     Glucose Blood (ACCU-CHEK TARA PLUS) In Vitro Strip Use as directed 1   strip once a week    Accu-Chek Softclix Lancets Does

## (undated) NOTE — Clinical Note
ASTHMA ACTION PLAN for Chelys Myrick     : 1975     Date: 2017  Provider:  Mikki Castrejon MD  Phone for doctor or clinic: [address] [place] [address]  [address]  [facility] [id number] [phone]    ACT Score: 25

## (undated) NOTE — Clinical Note
Referring:  MD Corrie Bethea [address]    Dear Dr. Janet Good: Thank you for referring your patient to me for an evaluation. Please see my attached note for my findings and recommendations.  Should you have any questio